# Patient Record
Sex: FEMALE | Race: WHITE | NOT HISPANIC OR LATINO
[De-identification: names, ages, dates, MRNs, and addresses within clinical notes are randomized per-mention and may not be internally consistent; named-entity substitution may affect disease eponyms.]

---

## 2018-04-24 ENCOUNTER — OTHER (OUTPATIENT)
Age: 68
End: 2018-04-24

## 2018-04-24 DIAGNOSIS — M25.562 PAIN IN RIGHT KNEE: ICD-10-CM

## 2018-04-24 DIAGNOSIS — M25.561 PAIN IN RIGHT KNEE: ICD-10-CM

## 2018-05-01 ENCOUNTER — APPOINTMENT (OUTPATIENT)
Dept: ORTHOPEDIC SURGERY | Facility: CLINIC | Age: 68
End: 2018-05-01
Payer: MEDICARE

## 2018-05-01 VITALS
HEIGHT: 67 IN | HEART RATE: 106 BPM | SYSTOLIC BLOOD PRESSURE: 128 MMHG | WEIGHT: 145 LBS | BODY MASS INDEX: 22.76 KG/M2 | DIASTOLIC BLOOD PRESSURE: 86 MMHG

## 2018-05-01 DIAGNOSIS — Z87.09 PERSONAL HISTORY OF OTHER DISEASES OF THE RESPIRATORY SYSTEM: ICD-10-CM

## 2018-05-01 DIAGNOSIS — Z82.61 FAMILY HISTORY OF ARTHRITIS: ICD-10-CM

## 2018-05-01 DIAGNOSIS — Z82.62 FAMILY HISTORY OF OSTEOPOROSIS: ICD-10-CM

## 2018-05-01 DIAGNOSIS — Z86.69 PERSONAL HISTORY OF OTHER DISEASES OF THE NERVOUS SYSTEM AND SENSE ORGANS: ICD-10-CM

## 2018-05-01 DIAGNOSIS — Z80.9 FAMILY HISTORY OF MALIGNANT NEOPLASM, UNSPECIFIED: ICD-10-CM

## 2018-05-01 DIAGNOSIS — Z87.891 PERSONAL HISTORY OF NICOTINE DEPENDENCE: ICD-10-CM

## 2018-05-01 DIAGNOSIS — Z86.79 PERSONAL HISTORY OF OTHER DISEASES OF THE CIRCULATORY SYSTEM: ICD-10-CM

## 2018-05-01 DIAGNOSIS — Z87.39 PERSONAL HISTORY OF OTHER DISEASES OF THE MUSCULOSKELETAL SYSTEM AND CONNECTIVE TISSUE: ICD-10-CM

## 2018-05-01 PROCEDURE — 99205 OFFICE O/P NEW HI 60 MIN: CPT

## 2018-05-01 PROCEDURE — 73564 X-RAY EXAM KNEE 4 OR MORE: CPT | Mod: 50

## 2018-05-01 RX ORDER — ALPRAZOLAM 0.5 MG/1
0.5 TABLET ORAL
Qty: 120 | Refills: 0 | Status: ACTIVE | COMMUNITY
Start: 2018-02-16

## 2018-05-01 RX ORDER — CHLORHEXIDINE GLUCONATE 4 %
LIQUID (ML) TOPICAL
Refills: 0 | Status: ACTIVE | COMMUNITY

## 2018-05-01 RX ORDER — ALENDRONATE SODIUM 70 MG/1
70 TABLET ORAL
Qty: 4 | Refills: 0 | Status: ACTIVE | COMMUNITY
Start: 2018-03-12

## 2018-05-01 RX ORDER — MULTIVIT-MIN/IRON/FOLIC ACID/K 18-600-40
CAPSULE ORAL
Refills: 0 | Status: ACTIVE | COMMUNITY

## 2018-05-01 RX ORDER — PROPRANOLOL HYDROCHLORIDE 10 MG/1
10 TABLET ORAL
Qty: 30 | Refills: 0 | Status: ACTIVE | COMMUNITY
Start: 2018-02-19

## 2018-05-01 RX ORDER — CYCLOSPORINE 0.5 MG/ML
0.05 EMULSION OPHTHALMIC
Refills: 0 | Status: ACTIVE | COMMUNITY

## 2018-05-01 RX ORDER — BACILLUS COAGULANS/INULIN 1B-250 MG
CAPSULE ORAL
Refills: 0 | Status: ACTIVE | COMMUNITY

## 2018-05-01 RX ORDER — UBIDECARENONE/VIT E ACET 100MG-5
CAPSULE ORAL
Refills: 0 | Status: ACTIVE | COMMUNITY

## 2018-05-01 RX ORDER — MAGNESIUM OXIDE/MAG AA CHELATE 300 MG
CAPSULE ORAL
Refills: 0 | Status: ACTIVE | COMMUNITY

## 2018-05-01 RX ORDER — ZINC SULFATE 50(220)MG
CAPSULE ORAL
Refills: 0 | Status: ACTIVE | COMMUNITY

## 2018-05-01 RX ORDER — ALBUTEROL 90 MCG
AEROSOL (GRAM) INHALATION
Refills: 0 | Status: ACTIVE | COMMUNITY

## 2018-05-01 RX ORDER — FEXOFENADINE HYDROCHLORIDE AND PSEUDOEPHEDRINE HYDROCHLORIDE 180; 240 MG/1; MG/1
TABLET, FILM COATED, EXTENDED RELEASE ORAL
Refills: 0 | Status: ACTIVE | COMMUNITY

## 2018-05-01 RX ORDER — ECONAZOLE NITRATE 10 MG/G
1 CREAM TOPICAL
Qty: 85 | Refills: 0 | Status: ACTIVE | COMMUNITY
Start: 2017-11-13

## 2018-05-01 RX ORDER — FLUTICASONE PROPIONATE AND SALMETEROL 50; 500 UG/1; UG/1
POWDER RESPIRATORY (INHALATION)
Refills: 0 | Status: ACTIVE | COMMUNITY

## 2018-06-11 ENCOUNTER — OTHER (OUTPATIENT)
Age: 68
End: 2018-06-11

## 2018-06-12 ENCOUNTER — OTHER (OUTPATIENT)
Age: 68
End: 2018-06-12

## 2018-07-17 ENCOUNTER — OTHER (OUTPATIENT)
Age: 68
End: 2018-07-17

## 2018-07-23 ENCOUNTER — APPOINTMENT (OUTPATIENT)
Dept: ORTHOPEDIC SURGERY | Facility: CLINIC | Age: 68
End: 2018-07-23
Payer: MEDICARE

## 2018-07-23 VITALS
HEART RATE: 116 BPM | WEIGHT: 145 LBS | HEIGHT: 67 IN | SYSTOLIC BLOOD PRESSURE: 131 MMHG | BODY MASS INDEX: 22.76 KG/M2 | DIASTOLIC BLOOD PRESSURE: 86 MMHG

## 2018-07-23 DIAGNOSIS — M17.12 UNILATERAL PRIMARY OSTEOARTHRITIS, LEFT KNEE: ICD-10-CM

## 2018-07-23 DIAGNOSIS — M17.11 UNILATERAL PRIMARY OSTEOARTHRITIS, RIGHT KNEE: ICD-10-CM

## 2018-07-23 PROCEDURE — 99213 OFFICE O/P EST LOW 20 MIN: CPT

## 2018-09-05 ENCOUNTER — OUTPATIENT (OUTPATIENT)
Dept: OUTPATIENT SERVICES | Facility: HOSPITAL | Age: 68
LOS: 1 days | End: 2018-09-05
Payer: MEDICARE

## 2018-09-05 VITALS
DIASTOLIC BLOOD PRESSURE: 82 MMHG | TEMPERATURE: 97 F | RESPIRATION RATE: 16 BRPM | WEIGHT: 149.91 LBS | SYSTOLIC BLOOD PRESSURE: 115 MMHG | HEIGHT: 67 IN | HEART RATE: 81 BPM

## 2018-09-05 DIAGNOSIS — Z98.51 TUBAL LIGATION STATUS: Chronic | ICD-10-CM

## 2018-09-05 DIAGNOSIS — Z98.49 CATARACT EXTRACTION STATUS, UNSPECIFIED EYE: Chronic | ICD-10-CM

## 2018-09-05 DIAGNOSIS — J45.909 UNSPECIFIED ASTHMA, UNCOMPLICATED: ICD-10-CM

## 2018-09-05 DIAGNOSIS — Z01.818 ENCOUNTER FOR OTHER PREPROCEDURAL EXAMINATION: ICD-10-CM

## 2018-09-05 DIAGNOSIS — Z29.9 ENCOUNTER FOR PROPHYLACTIC MEASURES, UNSPECIFIED: ICD-10-CM

## 2018-09-05 DIAGNOSIS — R00.2 PALPITATIONS: ICD-10-CM

## 2018-09-05 DIAGNOSIS — M17.11 UNILATERAL PRIMARY OSTEOARTHRITIS, RIGHT KNEE: ICD-10-CM

## 2018-09-05 DIAGNOSIS — Z98.890 OTHER SPECIFIED POSTPROCEDURAL STATES: Chronic | ICD-10-CM

## 2018-09-05 LAB
ANION GAP SERPL CALC-SCNC: 14 MMOL/L — SIGNIFICANT CHANGE UP (ref 5–17)
APTT BLD: 28.1 SEC — SIGNIFICANT CHANGE UP (ref 27.5–37.4)
BASOPHILS # BLD AUTO: 0 K/UL — SIGNIFICANT CHANGE UP (ref 0–0.2)
BASOPHILS NFR BLD AUTO: 0.5 % — SIGNIFICANT CHANGE UP (ref 0–2)
BLD GP AB SCN SERPL QL: SIGNIFICANT CHANGE UP
BUN SERPL-MCNC: 18 MG/DL — SIGNIFICANT CHANGE UP (ref 8–20)
CALCIUM SERPL-MCNC: 9.7 MG/DL — SIGNIFICANT CHANGE UP (ref 8.6–10.2)
CHLORIDE SERPL-SCNC: 98 MMOL/L — SIGNIFICANT CHANGE UP (ref 98–107)
CO2 SERPL-SCNC: 25 MMOL/L — SIGNIFICANT CHANGE UP (ref 22–29)
CREAT SERPL-MCNC: 0.58 MG/DL — SIGNIFICANT CHANGE UP (ref 0.5–1.3)
DIR ANTIGLOB POLYSPECIFIC INTERPRETATION: SIGNIFICANT CHANGE UP
EOSINOPHIL # BLD AUTO: 0.5 K/UL — SIGNIFICANT CHANGE UP (ref 0–0.5)
EOSINOPHIL NFR BLD AUTO: 6.7 % — HIGH (ref 0–6)
GLUCOSE SERPL-MCNC: 102 MG/DL — SIGNIFICANT CHANGE UP (ref 70–115)
HCT VFR BLD CALC: 50.7 % — HIGH (ref 37–47)
HGB BLD-MCNC: 16.5 G/DL — HIGH (ref 12–16)
INR BLD: 1.08 RATIO — SIGNIFICANT CHANGE UP (ref 0.88–1.16)
LYMPHOCYTES # BLD AUTO: 1.6 K/UL — SIGNIFICANT CHANGE UP (ref 1–4.8)
LYMPHOCYTES # BLD AUTO: 21.5 % — SIGNIFICANT CHANGE UP (ref 20–55)
MCHC RBC-ENTMCNC: 31.3 PG — HIGH (ref 27–31)
MCHC RBC-ENTMCNC: 32.5 G/DL — SIGNIFICANT CHANGE UP (ref 32–36)
MCV RBC AUTO: 96.2 FL — SIGNIFICANT CHANGE UP (ref 81–99)
MONOCYTES # BLD AUTO: 0.6 K/UL — SIGNIFICANT CHANGE UP (ref 0–0.8)
MONOCYTES NFR BLD AUTO: 8.6 % — SIGNIFICANT CHANGE UP (ref 3–10)
MRSA PCR RESULT.: SIGNIFICANT CHANGE UP
NEUTROPHILS # BLD AUTO: 4.6 K/UL — SIGNIFICANT CHANGE UP (ref 1.8–8)
NEUTROPHILS NFR BLD AUTO: 62.6 % — SIGNIFICANT CHANGE UP (ref 37–73)
PLATELET # BLD AUTO: 284 K/UL — SIGNIFICANT CHANGE UP (ref 150–400)
POTASSIUM SERPL-MCNC: 4.7 MMOL/L — SIGNIFICANT CHANGE UP (ref 3.5–5.3)
POTASSIUM SERPL-SCNC: 4.7 MMOL/L — SIGNIFICANT CHANGE UP (ref 3.5–5.3)
PROTHROM AB SERPL-ACNC: 11.9 SEC — SIGNIFICANT CHANGE UP (ref 9.8–12.7)
RBC # BLD: 5.27 M/UL — HIGH (ref 4.4–5.2)
RBC # FLD: 13.7 % — SIGNIFICANT CHANGE UP (ref 11–15.6)
S AUREUS DNA NOSE QL NAA+PROBE: SIGNIFICANT CHANGE UP
SODIUM SERPL-SCNC: 137 MMOL/L — SIGNIFICANT CHANGE UP (ref 135–145)
TYPE + AB SCN PNL BLD: SIGNIFICANT CHANGE UP
WBC # BLD: 7.4 K/UL — SIGNIFICANT CHANGE UP (ref 4.8–10.8)
WBC # FLD AUTO: 7.4 K/UL — SIGNIFICANT CHANGE UP (ref 4.8–10.8)

## 2018-09-05 PROCEDURE — 86901 BLOOD TYPING SEROLOGIC RH(D): CPT

## 2018-09-05 PROCEDURE — 86900 BLOOD TYPING SEROLOGIC ABO: CPT

## 2018-09-05 PROCEDURE — 85730 THROMBOPLASTIN TIME PARTIAL: CPT

## 2018-09-05 PROCEDURE — 85610 PROTHROMBIN TIME: CPT

## 2018-09-05 PROCEDURE — 86850 RBC ANTIBODY SCREEN: CPT

## 2018-09-05 PROCEDURE — 87641 MR-STAPH DNA AMP PROBE: CPT

## 2018-09-05 PROCEDURE — 80048 BASIC METABOLIC PNL TOTAL CA: CPT

## 2018-09-05 PROCEDURE — G0463: CPT

## 2018-09-05 PROCEDURE — 85027 COMPLETE CBC AUTOMATED: CPT

## 2018-09-05 PROCEDURE — 86880 COOMBS TEST DIRECT: CPT

## 2018-09-05 PROCEDURE — 36415 COLL VENOUS BLD VENIPUNCTURE: CPT

## 2018-09-05 PROCEDURE — 87640 STAPH A DNA AMP PROBE: CPT

## 2018-09-05 RX ORDER — SODIUM CHLORIDE 9 MG/ML
3 INJECTION INTRAMUSCULAR; INTRAVENOUS; SUBCUTANEOUS EVERY 8 HOURS
Qty: 0 | Refills: 0 | Status: DISCONTINUED | OUTPATIENT
Start: 2018-11-07 | End: 2018-11-09

## 2018-09-05 NOTE — H&P PST ADULT - FAMILY HISTORY
Child  Still living? Yes, Estimated age: 31-40  Family history of malignant hyperthermia, Age at diagnosis: Age Unknown     Father  Still living? No  Family history of coronary artery disease, Age at diagnosis: 51-60

## 2018-09-05 NOTE — H&P PST ADULT - NSANTHOSAYNRD_GEN_A_CORE
No. MAYCOL screening performed.  STOP BANG Legend: 0-2 = LOW Risk; 3-4 = INTERMEDIATE Risk; 5-8 = HIGH Risk

## 2018-09-05 NOTE — H&P PST ADULT - ASSESSMENT
68 yo female with right knee osteoarthritis.  Family history of malignant hyperthermia, D/W Dr. Ward, pt to be a first case, OR bookers aware.    CAPRINI SCORE [CLOT]    AGE RELATED RISK FACTORS                                                       MOBILITY RELATED FACTORS  [ ] Age 41-60 years                                            (1 Point)                  [ ] Bed rest                                                        (1 Point)  [  2] Age: 61-74 years                                           (2 Points)                 [ ] Plaster cast                                                   (2 Points)  [ ] Age= 75 years                                              (3 Points)                 [ ] Bed bound for more than 72 hours                 (2 Points)    DISEASE RELATED RISK FACTORS                                               GENDER SPECIFIC FACTORS  [ ] Edema in the lower extremities                       (1 Point)                  [ ] Pregnancy                                                     (1 Point)  [ ] Varicose veins                                               (1 Point)                  [ ] Post-partum < 6 weeks                                   (1 Point)             [ x ] BMI > 25 Kg/m2                                            (1 Point)                  [ ] Hormonal therapy  or oral contraception          (1 Point)                 [ ] Sepsis (in the previous month)                        (1 Point)                  [ ] History of pregnancy complications                 (1 point)  [ ] Pneumonia or serious lung disease                                               [ ] Unexplained or recurrent                     (1 Point)           (in the previous month)                               (1 Point)  [ ] Abnormal pulmonary function test                     (1 Point)                 SURGERY RELATED RISK FACTORS  [ ] Acute myocardial infarction                              (1 Point)                 [ ]  Section                                             (1 Point)  [ ] Congestive heart failure (in the previous month)  (1 Point)               [ ] Minor surgery                                                  (1 Point)   [ ] Inflammatory bowel disease                             (1 Point)                 [ ] Arthroscopic surgery                                        (2 Points)  [ ] Central venous access                                      (2 Points)                [ ] General surgery lasting more than 45 minutes   (2 Points)       [ ] Stroke (in the previous month)                          (5 Points)               [ 8 ] Elective arthroplasty                                         (5 Points)                                                                                                                                               HEMATOLOGY RELATED FACTORS                                                 TRAUMA RELATED RISK FACTORS  [ ] Prior episodes of VTE                                     (3 Points)                 [ ] Fracture of the hip, pelvis, or leg                       (5 Points)  [ ] Positive family history for VTE                         (3 Points)                 [ ] Acute spinal cord injury (in the previous month)  (5 Points)  [ ] Prothrombin 98139 A                                     (3 Points)                 [ ] Paralysis  (less than 1 month)                             (5 Points)  [ ] Factor V Leiden                                             (3 Points)                  [ ] Multiple Trauma within 1 month                        (5 Points)  [ ] Lupus anticoagulants                                     (3 Points)                                                           [ ] Anticardiolipin antibodies                               (3 Points)                                                       [ ] High homocysteine in the blood                      (3 Points)                                             [ ] Other congenital or acquired thrombophilia      (3 Points)                                                [ ] Heparin induced thrombocytopenia                  (3 Points)                                          Total Score [     8     ]

## 2018-09-05 NOTE — H&P PST ADULT - PSH
S/P cataract extraction and insertion of intraocular lens    S/P knee surgery  R  S/P tubal ligation

## 2018-09-12 ENCOUNTER — OTHER (OUTPATIENT)
Age: 68
End: 2018-09-12

## 2018-09-13 RX ORDER — SCOPALAMINE 1 MG/3D
1 PATCH, EXTENDED RELEASE TRANSDERMAL ONCE
Qty: 0 | Refills: 0 | Status: DISCONTINUED | OUTPATIENT
Start: 2018-11-07 | End: 2018-11-09

## 2018-09-13 RX ORDER — TRANEXAMIC ACID 100 MG/ML
700 INJECTION, SOLUTION INTRAVENOUS ONCE
Qty: 0 | Refills: 0 | Status: DISCONTINUED | OUTPATIENT
Start: 2018-11-07 | End: 2018-11-09

## 2018-09-13 RX ORDER — OXYCODONE HYDROCHLORIDE 5 MG/1
10 TABLET ORAL ONCE
Qty: 0 | Refills: 0 | Status: DISCONTINUED | OUTPATIENT
Start: 2018-11-07 | End: 2018-11-09

## 2018-09-19 ENCOUNTER — APPOINTMENT (OUTPATIENT)
Dept: ORTHOPEDIC SURGERY | Facility: HOSPITAL | Age: 68
End: 2018-09-19

## 2018-10-04 ENCOUNTER — APPOINTMENT (OUTPATIENT)
Dept: ORTHOPEDIC SURGERY | Facility: CLINIC | Age: 68
End: 2018-10-04

## 2018-10-10 PROBLEM — B19.10 UNSPECIFIED VIRAL HEPATITIS B WITHOUT HEPATIC COMA: Chronic | Status: ACTIVE | Noted: 2018-09-05

## 2018-10-10 PROBLEM — J45.909 UNSPECIFIED ASTHMA, UNCOMPLICATED: Chronic | Status: ACTIVE | Noted: 2018-09-05

## 2018-10-10 PROBLEM — R00.2 PALPITATIONS: Chronic | Status: ACTIVE | Noted: 2018-09-05

## 2018-10-24 ENCOUNTER — OUTPATIENT (OUTPATIENT)
Dept: OUTPATIENT SERVICES | Facility: HOSPITAL | Age: 68
LOS: 1 days | End: 2018-10-24
Payer: MEDICARE

## 2018-10-24 VITALS
SYSTOLIC BLOOD PRESSURE: 108 MMHG | DIASTOLIC BLOOD PRESSURE: 70 MMHG | TEMPERATURE: 98 F | RESPIRATION RATE: 20 BRPM | WEIGHT: 152.56 LBS | HEIGHT: 66 IN | HEART RATE: 80 BPM

## 2018-10-24 DIAGNOSIS — Z98.51 TUBAL LIGATION STATUS: Chronic | ICD-10-CM

## 2018-10-24 DIAGNOSIS — M17.12 UNILATERAL PRIMARY OSTEOARTHRITIS, LEFT KNEE: ICD-10-CM

## 2018-10-24 DIAGNOSIS — Z98.49 CATARACT EXTRACTION STATUS, UNSPECIFIED EYE: Chronic | ICD-10-CM

## 2018-10-24 DIAGNOSIS — Z98.890 OTHER SPECIFIED POSTPROCEDURAL STATES: Chronic | ICD-10-CM

## 2018-10-24 DIAGNOSIS — Z01.818 ENCOUNTER FOR OTHER PREPROCEDURAL EXAMINATION: ICD-10-CM

## 2018-10-24 DIAGNOSIS — I47.2 VENTRICULAR TACHYCARDIA: ICD-10-CM

## 2018-10-24 DIAGNOSIS — Z29.9 ENCOUNTER FOR PROPHYLACTIC MEASURES, UNSPECIFIED: ICD-10-CM

## 2018-10-24 LAB
ANION GAP SERPL CALC-SCNC: 12 MMOL/L — SIGNIFICANT CHANGE UP (ref 5–17)
APTT BLD: 29.2 SEC — SIGNIFICANT CHANGE UP (ref 27.5–37.4)
BLD GP AB SCN SERPL QL: SIGNIFICANT CHANGE UP
BUN SERPL-MCNC: 18 MG/DL — SIGNIFICANT CHANGE UP (ref 8–20)
CALCIUM SERPL-MCNC: 9.6 MG/DL — SIGNIFICANT CHANGE UP (ref 8.6–10.2)
CHLORIDE SERPL-SCNC: 98 MMOL/L — SIGNIFICANT CHANGE UP (ref 98–107)
CO2 SERPL-SCNC: 26 MMOL/L — SIGNIFICANT CHANGE UP (ref 22–29)
CREAT SERPL-MCNC: 0.58 MG/DL — SIGNIFICANT CHANGE UP (ref 0.5–1.3)
GLUCOSE SERPL-MCNC: 98 MG/DL — SIGNIFICANT CHANGE UP (ref 70–115)
HCT VFR BLD CALC: 48.2 % — HIGH (ref 37–47)
HGB BLD-MCNC: 16.3 G/DL — HIGH (ref 12–16)
INR BLD: 1.1 RATIO — SIGNIFICANT CHANGE UP (ref 0.88–1.16)
MCHC RBC-ENTMCNC: 31.5 PG — HIGH (ref 27–31)
MCHC RBC-ENTMCNC: 33.8 G/DL — SIGNIFICANT CHANGE UP (ref 32–36)
MCV RBC AUTO: 93.1 FL — SIGNIFICANT CHANGE UP (ref 81–99)
MRSA PCR RESULT.: SIGNIFICANT CHANGE UP
PLATELET # BLD AUTO: 300 K/UL — SIGNIFICANT CHANGE UP (ref 150–400)
POTASSIUM SERPL-MCNC: 4.3 MMOL/L — SIGNIFICANT CHANGE UP (ref 3.5–5.3)
POTASSIUM SERPL-SCNC: 4.3 MMOL/L — SIGNIFICANT CHANGE UP (ref 3.5–5.3)
PROTHROM AB SERPL-ACNC: 12.1 SEC — SIGNIFICANT CHANGE UP (ref 9.8–12.7)
RBC # BLD: 5.18 M/UL — SIGNIFICANT CHANGE UP (ref 4.4–5.2)
RBC # FLD: 14.2 % — SIGNIFICANT CHANGE UP (ref 11–15.6)
S AUREUS DNA NOSE QL NAA+PROBE: SIGNIFICANT CHANGE UP
SODIUM SERPL-SCNC: 136 MMOL/L — SIGNIFICANT CHANGE UP (ref 135–145)
TYPE + AB SCN PNL BLD: SIGNIFICANT CHANGE UP
WBC # BLD: 7.2 K/UL — SIGNIFICANT CHANGE UP (ref 4.8–10.8)
WBC # FLD AUTO: 7.2 K/UL — SIGNIFICANT CHANGE UP (ref 4.8–10.8)

## 2018-10-24 PROCEDURE — 80048 BASIC METABOLIC PNL TOTAL CA: CPT

## 2018-10-24 PROCEDURE — 36415 COLL VENOUS BLD VENIPUNCTURE: CPT

## 2018-10-24 PROCEDURE — 85730 THROMBOPLASTIN TIME PARTIAL: CPT

## 2018-10-24 PROCEDURE — 86901 BLOOD TYPING SEROLOGIC RH(D): CPT

## 2018-10-24 PROCEDURE — 87640 STAPH A DNA AMP PROBE: CPT

## 2018-10-24 PROCEDURE — 86803 HEPATITIS C AB TEST: CPT

## 2018-10-24 PROCEDURE — 86900 BLOOD TYPING SEROLOGIC ABO: CPT

## 2018-10-24 PROCEDURE — 85610 PROTHROMBIN TIME: CPT

## 2018-10-24 PROCEDURE — G0463: CPT

## 2018-10-24 PROCEDURE — 85027 COMPLETE CBC AUTOMATED: CPT

## 2018-10-24 PROCEDURE — 86850 RBC ANTIBODY SCREEN: CPT

## 2018-10-24 RX ORDER — METOPROLOL TARTRATE 50 MG
0.5 TABLET ORAL
Qty: 0 | Refills: 0 | COMMUNITY

## 2018-10-24 RX ORDER — UBIDECARENONE 100 MG
1 CAPSULE ORAL
Qty: 0 | Refills: 0 | COMMUNITY

## 2018-10-24 RX ORDER — SODIUM CHLORIDE 9 MG/ML
3 INJECTION INTRAMUSCULAR; INTRAVENOUS; SUBCUTANEOUS EVERY 8 HOURS
Qty: 0 | Refills: 0 | Status: DISCONTINUED | OUTPATIENT
Start: 2018-11-07 | End: 2018-11-09

## 2018-10-24 NOTE — H&P PST ADULT - ASSESSMENT
CAPRINI SCORE [CLOT]    AGE RELATED RISK FACTORS                                                       MOBILITY RELATED FACTORS  [ ] Age 41-60 years                                            (1 Point)                  [ ] Bed rest                                                        (1 Point)  [x] Age: 61-74 years                                           (2 Points)                 [ ] Plaster cast                                                   (2 Points)  [ ] Age= 75 years                                              (3 Points)                 [ ] Bed bound for more than 72 hours                 (2 Points)    DISEASE RELATED RISK FACTORS                                               GENDER SPECIFIC FACTORS  [ ] Edema in the lower extremities                       (1 Point)                  [ ] Pregnancy                                                     (1 Point)  [ ] Varicose veins                                               (1 Point)                  [ ] Post-partum < 6 weeks                                   (1 Point)             [ ] BMI > 25 Kg/m2                                            (1 Point)                  [ ] Hormonal therapy  or oral contraception          (1 Point)                 [ ] Sepsis (in the previous month)                        (1 Point)                  [ ] History of pregnancy complications                 (1 point)  [ ] Pneumonia or serious lung disease                                               [ ] Unexplained or recurrent                     (1 Point)           (in the previous month)                               (1 Point)  [ ] Abnormal pulmonary function test                     (1 Point)                 SURGERY RELATED RISK FACTORS  [ ] Acute myocardial infarction                              (1 Point)                 [ ]  Section                                             (1 Point)  [ ] Congestive heart failure (in the previous month)  (1 Point)               [ ] Minor surgery                                                  (1 Point)   [ ] Inflammatory bowel disease                             (1 Point)                 [ ] Arthroscopic surgery                                        (2 Points)  [ ] Central venous access                                      (2 Points)                [ ] General surgery lasting more than 45 minutes   (2 Points)       [ ] Stroke (in the previous month)                          (5 Points)               [x ] Elective arthroplasty                                         (5 Points)                                                                                                                                               HEMATOLOGY RELATED FACTORS                                                 TRAUMA RELATED RISK FACTORS  [ ] Prior episodes of VTE                                     (3 Points)                 [ ] Fracture of the hip, pelvis, or leg                       (5 Points)  [ ] Positive family history for VTE                         (3 Points)                 [ ] Acute spinal cord injury (in the previous month)  (5 Points)  [ ] Prothrombin 60419 A                                     (3 Points)                 [ ] Paralysis  (less than 1 month)                             (5 Points)  [ ] Factor V Leiden                                             (3 Points)                  [ ] Multiple Trauma within 1 month                        (5 Points)  [ ] Lupus anticoagulants                                     (3 Points)                                                           [ ] Anticardiolipin antibodies                               (3 Points)                                                       [ ] High homocysteine in the blood                      (3 Points)                                             [ ] Other congenital or acquired thrombophilia      (3 Points)                                                [ ] Heparin induced thrombocytopenia                  (3 Points)                                          Total Score [   7       ]

## 2018-10-24 NOTE — H&P PST ADULT - HISTORY OF PRESENT ILLNESS
This is a 68 y.o female who presents to PST today.  The pt reports a chronic history of left knee pain which has become worse more recently.  She is scheduled for a left knee replacement in the near future.

## 2018-10-24 NOTE — H&P PST ADULT - RS GEN PE MLT RESP DETAILS PC
respirations non-labored/normal/diminished breath sounds, L/airway patent/diminished breath sounds, R

## 2018-10-24 NOTE — PATIENT PROFILE ADULT - NSPROEDALEARNPREF_GEN_A_NUR
Patient verbalized understanding of all instructions including surgical wash and pain scale/individual instruction

## 2018-10-25 LAB
HCV AB S/CO SERPL IA: 0.09 S/CO — SIGNIFICANT CHANGE UP
HCV AB SERPL-IMP: SIGNIFICANT CHANGE UP

## 2018-10-26 RX ORDER — ACETAMINOPHEN 500 MG
1000 TABLET ORAL ONCE
Qty: 0 | Refills: 0 | Status: DISCONTINUED | OUTPATIENT
Start: 2018-11-07 | End: 2018-11-07

## 2018-10-26 RX ORDER — TRANEXAMIC ACID 100 MG/ML
700 INJECTION, SOLUTION INTRAVENOUS ONCE
Qty: 0 | Refills: 0 | Status: DISCONTINUED | OUTPATIENT
Start: 2018-11-07 | End: 2018-11-07

## 2018-10-26 RX ORDER — CEFAZOLIN SODIUM 1 G
2000 VIAL (EA) INJECTION ONCE
Qty: 0 | Refills: 0 | Status: DISCONTINUED | OUTPATIENT
Start: 2018-11-07 | End: 2018-11-07

## 2018-11-01 ENCOUNTER — APPOINTMENT (OUTPATIENT)
Dept: ORTHOPEDIC SURGERY | Facility: CLINIC | Age: 68
End: 2018-11-01

## 2018-11-06 ENCOUNTER — TRANSCRIPTION ENCOUNTER (OUTPATIENT)
Age: 68
End: 2018-11-06

## 2018-11-07 ENCOUNTER — TRANSCRIPTION ENCOUNTER (OUTPATIENT)
Age: 68
End: 2018-11-07

## 2018-11-07 ENCOUNTER — INPATIENT (INPATIENT)
Facility: HOSPITAL | Age: 68
LOS: 1 days | Discharge: HOME | DRG: 470 | End: 2018-11-09
Attending: ORTHOPAEDIC SURGERY | Admitting: ORTHOPAEDIC SURGERY
Payer: MEDICARE

## 2018-11-07 ENCOUNTER — APPOINTMENT (OUTPATIENT)
Dept: ORTHOPEDIC SURGERY | Facility: HOSPITAL | Age: 68
End: 2018-11-07

## 2018-11-07 VITALS
SYSTOLIC BLOOD PRESSURE: 116 MMHG | HEART RATE: 88 BPM | RESPIRATION RATE: 16 BRPM | TEMPERATURE: 97 F | DIASTOLIC BLOOD PRESSURE: 70 MMHG | WEIGHT: 147.93 LBS | OXYGEN SATURATION: 100 % | HEIGHT: 67 IN

## 2018-11-07 DIAGNOSIS — Z98.49 CATARACT EXTRACTION STATUS, UNSPECIFIED EYE: Chronic | ICD-10-CM

## 2018-11-07 DIAGNOSIS — Z86.79 PERSONAL HISTORY OF OTHER DISEASES OF THE CIRCULATORY SYSTEM: ICD-10-CM

## 2018-11-07 DIAGNOSIS — M17.0 BILATERAL PRIMARY OSTEOARTHRITIS OF KNEE: ICD-10-CM

## 2018-11-07 DIAGNOSIS — M17.11 UNILATERAL PRIMARY OSTEOARTHRITIS, RIGHT KNEE: ICD-10-CM

## 2018-11-07 DIAGNOSIS — Z29.9 ENCOUNTER FOR PROPHYLACTIC MEASURES, UNSPECIFIED: ICD-10-CM

## 2018-11-07 DIAGNOSIS — Z86.19 PERSONAL HISTORY OF OTHER INFECTIOUS AND PARASITIC DISEASES: ICD-10-CM

## 2018-11-07 DIAGNOSIS — Z00.00 ENCOUNTER FOR GENERAL ADULT MEDICAL EXAMINATION WITHOUT ABNORMAL FINDINGS: ICD-10-CM

## 2018-11-07 DIAGNOSIS — Z98.890 OTHER SPECIFIED POSTPROCEDURAL STATES: Chronic | ICD-10-CM

## 2018-11-07 DIAGNOSIS — Z98.51 TUBAL LIGATION STATUS: Chronic | ICD-10-CM

## 2018-11-07 DIAGNOSIS — Z87.09 PERSONAL HISTORY OF OTHER DISEASES OF THE RESPIRATORY SYSTEM: ICD-10-CM

## 2018-11-07 DIAGNOSIS — Z96.651 PRESENCE OF RIGHT ARTIFICIAL KNEE JOINT: ICD-10-CM

## 2018-11-07 DIAGNOSIS — M17.12 UNILATERAL PRIMARY OSTEOARTHRITIS, LEFT KNEE: ICD-10-CM

## 2018-11-07 LAB
BLD GP AB SCN SERPL QL: SIGNIFICANT CHANGE UP
GLUCOSE BLDC GLUCOMTR-MCNC: 86 MG/DL — SIGNIFICANT CHANGE UP (ref 70–99)
GLUCOSE BLDC GLUCOMTR-MCNC: 96 MG/DL — SIGNIFICANT CHANGE UP (ref 70–99)
GLUCOSE BLDC GLUCOMTR-MCNC: 98 MG/DL — SIGNIFICANT CHANGE UP (ref 70–99)
TYPE + AB SCN PNL BLD: SIGNIFICANT CHANGE UP

## 2018-11-07 PROCEDURE — 27447 TOTAL KNEE ARTHROPLASTY: CPT | Mod: AS,RT

## 2018-11-07 PROCEDURE — 73560 X-RAY EXAM OF KNEE 1 OR 2: CPT | Mod: 26,RT

## 2018-11-07 PROCEDURE — 99223 1ST HOSP IP/OBS HIGH 75: CPT

## 2018-11-07 PROCEDURE — 20985 CPTR-ASST DIR MS PX: CPT | Mod: AS

## 2018-11-07 PROCEDURE — 11422 EXC H-F-NK-SP B9+MARG 1.1-2: CPT | Mod: RT

## 2018-11-07 PROCEDURE — 27447 TOTAL KNEE ARTHROPLASTY: CPT | Mod: RT

## 2018-11-07 PROCEDURE — 20985 CPTR-ASST DIR MS PX: CPT

## 2018-11-07 RX ORDER — ASPIRIN/CALCIUM CARB/MAGNESIUM 324 MG
325 TABLET ORAL
Qty: 0 | Refills: 0 | Status: DISCONTINUED | OUTPATIENT
Start: 2018-11-08 | End: 2018-11-09

## 2018-11-07 RX ORDER — ZOLPIDEM TARTRATE 10 MG/1
5 TABLET ORAL AT BEDTIME
Qty: 0 | Refills: 0 | Status: DISCONTINUED | OUTPATIENT
Start: 2018-11-07 | End: 2018-11-09

## 2018-11-07 RX ORDER — SODIUM CHLORIDE 9 MG/ML
1000 INJECTION, SOLUTION INTRAVENOUS
Qty: 0 | Refills: 0 | Status: DISCONTINUED | OUTPATIENT
Start: 2018-11-07 | End: 2018-11-07

## 2018-11-07 RX ORDER — LORATADINE 10 MG/1
10 TABLET ORAL DAILY
Qty: 0 | Refills: 0 | Status: DISCONTINUED | OUTPATIENT
Start: 2018-11-07 | End: 2018-11-09

## 2018-11-07 RX ORDER — ALPRAZOLAM 0.25 MG
0.5 TABLET ORAL AT BEDTIME
Qty: 0 | Refills: 0 | Status: DISCONTINUED | OUTPATIENT
Start: 2018-11-07 | End: 2018-11-09

## 2018-11-07 RX ORDER — CELECOXIB 200 MG/1
200 CAPSULE ORAL
Qty: 0 | Refills: 0 | Status: DISCONTINUED | OUTPATIENT
Start: 2018-11-09 | End: 2018-11-09

## 2018-11-07 RX ORDER — CEPHALEXIN 500 MG
500 CAPSULE ORAL
Qty: 0 | Refills: 0 | Status: DISCONTINUED | OUTPATIENT
Start: 2018-11-08 | End: 2018-11-09

## 2018-11-07 RX ORDER — HYDROMORPHONE HYDROCHLORIDE 2 MG/ML
0.5 INJECTION INTRAMUSCULAR; INTRAVENOUS; SUBCUTANEOUS
Qty: 0 | Refills: 0 | Status: DISCONTINUED | OUTPATIENT
Start: 2018-11-07 | End: 2018-11-07

## 2018-11-07 RX ORDER — POLYETHYLENE GLYCOL 3350 17 G/17G
17 POWDER, FOR SOLUTION ORAL DAILY
Qty: 0 | Refills: 0 | Status: DISCONTINUED | OUTPATIENT
Start: 2018-11-07 | End: 2018-11-09

## 2018-11-07 RX ORDER — OXYCODONE HYDROCHLORIDE 5 MG/1
10 TABLET ORAL ONCE
Qty: 0 | Refills: 0 | Status: DISCONTINUED | OUTPATIENT
Start: 2018-11-07 | End: 2018-11-07

## 2018-11-07 RX ORDER — CEFAZOLIN SODIUM 1 G
2000 VIAL (EA) INJECTION ONCE
Qty: 0 | Refills: 0 | Status: DISCONTINUED | OUTPATIENT
Start: 2018-11-07 | End: 2018-11-07

## 2018-11-07 RX ORDER — ACETAMINOPHEN 500 MG
975 TABLET ORAL EVERY 8 HOURS
Qty: 0 | Refills: 0 | Status: DISCONTINUED | OUTPATIENT
Start: 2018-11-07 | End: 2018-11-09

## 2018-11-07 RX ORDER — VANCOMYCIN HCL 1 G
1000 VIAL (EA) INTRAVENOUS ONCE
Qty: 0 | Refills: 0 | Status: COMPLETED | OUTPATIENT
Start: 2018-11-07 | End: 2018-11-07

## 2018-11-07 RX ORDER — SODIUM CHLORIDE 9 MG/ML
1000 INJECTION, SOLUTION INTRAVENOUS
Qty: 0 | Refills: 0 | Status: DISCONTINUED | OUTPATIENT
Start: 2018-11-07 | End: 2018-11-09

## 2018-11-07 RX ORDER — OXYCODONE HYDROCHLORIDE 5 MG/1
10 TABLET ORAL EVERY 4 HOURS
Qty: 0 | Refills: 0 | Status: DISCONTINUED | OUTPATIENT
Start: 2018-11-07 | End: 2018-11-09

## 2018-11-07 RX ORDER — ACETAMINOPHEN 500 MG
1000 TABLET ORAL ONCE
Qty: 0 | Refills: 0 | Status: DISCONTINUED | OUTPATIENT
Start: 2018-11-07 | End: 2018-11-07

## 2018-11-07 RX ORDER — GABAPENTIN 400 MG/1
300 CAPSULE ORAL ONCE
Qty: 0 | Refills: 0 | Status: DISCONTINUED | OUTPATIENT
Start: 2018-11-07 | End: 2018-11-07

## 2018-11-07 RX ORDER — SENNA PLUS 8.6 MG/1
2 TABLET ORAL AT BEDTIME
Qty: 0 | Refills: 0 | Status: DISCONTINUED | OUTPATIENT
Start: 2018-11-07 | End: 2018-11-09

## 2018-11-07 RX ORDER — ALBUTEROL 90 UG/1
1 AEROSOL, METERED ORAL
Qty: 0 | Refills: 0 | Status: DISCONTINUED | OUTPATIENT
Start: 2018-11-07 | End: 2018-11-09

## 2018-11-07 RX ORDER — KETOROLAC TROMETHAMINE 30 MG/ML
15 SYRINGE (ML) INJECTION EVERY 6 HOURS
Qty: 0 | Refills: 0 | Status: DISCONTINUED | OUTPATIENT
Start: 2018-11-07 | End: 2018-11-09

## 2018-11-07 RX ORDER — VANCOMYCIN HCL 1 G
1000 VIAL (EA) INTRAVENOUS ONCE
Qty: 0 | Refills: 0 | Status: DISCONTINUED | OUTPATIENT
Start: 2018-11-07 | End: 2018-11-07

## 2018-11-07 RX ORDER — SCOPALAMINE 1 MG/3D
1 PATCH, EXTENDED RELEASE TRANSDERMAL ONCE
Qty: 0 | Refills: 0 | Status: COMPLETED | OUTPATIENT
Start: 2018-11-07 | End: 2018-11-07

## 2018-11-07 RX ORDER — MAGNESIUM HYDROXIDE 400 MG/1
30 TABLET, CHEWABLE ORAL DAILY
Qty: 0 | Refills: 0 | Status: DISCONTINUED | OUTPATIENT
Start: 2018-11-07 | End: 2018-11-09

## 2018-11-07 RX ORDER — OXYCODONE HYDROCHLORIDE 5 MG/1
5 TABLET ORAL EVERY 4 HOURS
Qty: 0 | Refills: 0 | Status: DISCONTINUED | OUTPATIENT
Start: 2018-11-07 | End: 2018-11-09

## 2018-11-07 RX ORDER — ACETAMINOPHEN 500 MG
1000 TABLET ORAL ONCE
Qty: 0 | Refills: 0 | Status: DISCONTINUED | OUTPATIENT
Start: 2018-11-07 | End: 2018-11-09

## 2018-11-07 RX ORDER — FENTANYL CITRATE 50 UG/ML
50 INJECTION INTRAVENOUS
Qty: 0 | Refills: 0 | Status: DISCONTINUED | OUTPATIENT
Start: 2018-11-07 | End: 2018-11-07

## 2018-11-07 RX ORDER — OXYCODONE HYDROCHLORIDE 5 MG/1
10 TABLET ORAL EVERY 12 HOURS
Qty: 0 | Refills: 0 | Status: DISCONTINUED | OUTPATIENT
Start: 2018-11-07 | End: 2018-11-09

## 2018-11-07 RX ORDER — GABAPENTIN 400 MG/1
300 CAPSULE ORAL ONCE
Qty: 0 | Refills: 0 | Status: COMPLETED | OUTPATIENT
Start: 2018-11-07 | End: 2018-11-07

## 2018-11-07 RX ORDER — ONDANSETRON 8 MG/1
4 TABLET, FILM COATED ORAL EVERY 6 HOURS
Qty: 0 | Refills: 0 | Status: DISCONTINUED | OUTPATIENT
Start: 2018-11-07 | End: 2018-11-09

## 2018-11-07 RX ORDER — BUDESONIDE AND FORMOTEROL FUMARATE DIHYDRATE 160; 4.5 UG/1; UG/1
2 AEROSOL RESPIRATORY (INHALATION)
Qty: 0 | Refills: 0 | Status: DISCONTINUED | OUTPATIENT
Start: 2018-11-07 | End: 2018-11-09

## 2018-11-07 RX ORDER — CELECOXIB 200 MG/1
400 CAPSULE ORAL ONCE
Qty: 0 | Refills: 0 | Status: DISCONTINUED | OUTPATIENT
Start: 2018-11-07 | End: 2018-11-07

## 2018-11-07 RX ORDER — METOPROLOL TARTRATE 50 MG
12.5 TABLET ORAL
Qty: 0 | Refills: 0 | Status: DISCONTINUED | OUTPATIENT
Start: 2018-11-07 | End: 2018-11-09

## 2018-11-07 RX ORDER — CELECOXIB 200 MG/1
400 CAPSULE ORAL ONCE
Qty: 0 | Refills: 0 | Status: COMPLETED | OUTPATIENT
Start: 2018-11-07 | End: 2018-11-07

## 2018-11-07 RX ORDER — VANCOMYCIN HCL 1 G
1000 VIAL (EA) INTRAVENOUS
Qty: 0 | Refills: 0 | Status: COMPLETED | OUTPATIENT
Start: 2018-11-07 | End: 2018-11-07

## 2018-11-07 RX ORDER — ONDANSETRON 8 MG/1
4 TABLET, FILM COATED ORAL ONCE
Qty: 0 | Refills: 0 | Status: DISCONTINUED | OUTPATIENT
Start: 2018-11-07 | End: 2018-11-07

## 2018-11-07 RX ORDER — CEFAZOLIN SODIUM 1 G
2000 VIAL (EA) INJECTION
Qty: 0 | Refills: 0 | Status: COMPLETED | OUTPATIENT
Start: 2018-11-07 | End: 2018-11-07

## 2018-11-07 RX ORDER — DOCUSATE SODIUM 100 MG
100 CAPSULE ORAL THREE TIMES A DAY
Qty: 0 | Refills: 0 | Status: DISCONTINUED | OUTPATIENT
Start: 2018-11-07 | End: 2018-11-09

## 2018-11-07 RX ORDER — HYDROMORPHONE HYDROCHLORIDE 2 MG/ML
0.5 INJECTION INTRAMUSCULAR; INTRAVENOUS; SUBCUTANEOUS
Qty: 0 | Refills: 0 | Status: DISCONTINUED | OUTPATIENT
Start: 2018-11-07 | End: 2018-11-09

## 2018-11-07 RX ORDER — HYDROMORPHONE HYDROCHLORIDE 2 MG/ML
2 INJECTION INTRAMUSCULAR; INTRAVENOUS; SUBCUTANEOUS
Qty: 0 | Refills: 0 | Status: DISCONTINUED | OUTPATIENT
Start: 2018-11-07 | End: 2018-11-09

## 2018-11-07 RX ADMIN — OXYCODONE HYDROCHLORIDE 10 MILLIGRAM(S): 5 TABLET ORAL at 17:26

## 2018-11-07 RX ADMIN — SODIUM CHLORIDE 3 MILLILITER(S): 9 INJECTION INTRAMUSCULAR; INTRAVENOUS; SUBCUTANEOUS at 21:39

## 2018-11-07 RX ADMIN — Medication 250 MILLIGRAM(S): at 07:28

## 2018-11-07 RX ADMIN — SENNA PLUS 2 TABLET(S): 8.6 TABLET ORAL at 22:19

## 2018-11-07 RX ADMIN — Medication 250 MILLIGRAM(S): at 22:24

## 2018-11-07 RX ADMIN — GABAPENTIN 300 MILLIGRAM(S): 400 CAPSULE ORAL at 07:17

## 2018-11-07 RX ADMIN — OXYCODONE HYDROCHLORIDE 10 MILLIGRAM(S): 5 TABLET ORAL at 07:17

## 2018-11-07 RX ADMIN — OXYCODONE HYDROCHLORIDE 5 MILLIGRAM(S): 5 TABLET ORAL at 14:04

## 2018-11-07 RX ADMIN — OXYCODONE HYDROCHLORIDE 10 MILLIGRAM(S): 5 TABLET ORAL at 22:30

## 2018-11-07 RX ADMIN — Medication 100 MILLIGRAM(S): at 21:39

## 2018-11-07 RX ADMIN — OXYCODONE HYDROCHLORIDE 10 MILLIGRAM(S): 5 TABLET ORAL at 21:38

## 2018-11-07 RX ADMIN — SCOPALAMINE 1 PATCH: 1 PATCH, EXTENDED RELEASE TRANSDERMAL at 07:17

## 2018-11-07 RX ADMIN — Medication 100 MILLIGRAM(S): at 14:09

## 2018-11-07 RX ADMIN — Medication 100 MILLIGRAM(S): at 22:19

## 2018-11-07 RX ADMIN — OXYCODONE HYDROCHLORIDE 5 MILLIGRAM(S): 5 TABLET ORAL at 13:34

## 2018-11-07 RX ADMIN — CELECOXIB 400 MILLIGRAM(S): 200 CAPSULE ORAL at 07:16

## 2018-11-07 NOTE — PHYSICAL THERAPY INITIAL EVALUATION ADULT - GAIT PATTERN USED, PT EVAL
decreased gait velocity and activity tolerance, antalgic gait pattern, increased trever UE support, (+) dizziness resolving c sitting rest break

## 2018-11-07 NOTE — CONSULT NOTE ADULT - ASSESSMENT
This is a 68 y.o female with chronic knee pain for more than 10 yrs , Had Synvisc inj in the past and was taking  Ibuprofen PRN with some relief . Pain was getting worst lately . Now she is s/p R TKA , POD # 0 . L knee planned for future .

## 2018-11-07 NOTE — CONSULT NOTE ADULT - PROBLEM SELECTOR RECOMMENDATION 5
- follows up with cardiologist , asymptomatic     Problem / Plan - Insomnia - continue home meds     Problem / Plan - Hx of Osteoporosis - Continue home meds

## 2018-11-07 NOTE — CONSULT NOTE ADULT - SUBJECTIVE AND OBJECTIVE BOX
PMD : Yeimy   Cardio : Vivek Flanagan     Patient is a 68y old  Female who is s/p R TKA , POD # 0 , tolerated procedure well .     CC: R knee pain     HPI: This is a 68 y.o female with chronic knee pain for more than 10 yrs , Had Synvisc inj in the past and was taking  Ibuprofen PRN with some relief . Pain was getting worst lately . Now she is s/p R TKA , POD # 0 . L knee planned for future .      PAST MEDICAL & SURGICAL HISTORY:  Hx of SVT   Hx of Hepatitis B- as a child   Asthma - mild   Insomnia   S/P cataract extraction and insertion of intraocular lens  S/P tubal ligation  S/P knee surgery: R      Social History:  Tobacco - denies   ETOH - drinks 1 glass of wine daily   Illicit drug abuse - denies    FAMILY HISTORY:  Family history of coronary artery disease (Father)  Family history of malignant hyperthermia (Child)      Allergies    carisoprodol (Hives)    Intolerances        HOME MEDICATIONS :     · 	Advair Diskus 250 mcg-50 mcg inhalation powder: Last Dose Taken:  , 1 puff(s) inhaled once a day  · 	albuterol 90 mcg/inh inhalation aerosol: Last Dose Taken:  , 2 puff(s) inhaled 4 times a day, As Needed  · 	Xanax 0.5 mg oral tablet: Last Dose Taken:  , 1 tab(s) orally once a day (at bedtime), As Needed  · 	Ambien 10 mg oral tablet: Last Dose Taken:  , 1 tab(s) orally once a day (at bedtime), As Needed  · 	Restasis 0.05% ophthalmic emulsion: Last Dose Taken:  , 1 drop(s) to each affected eye once a day (at bedtime)  · 	progesterone: Last Dose Taken:  , vaginal once a day  · 	Allegra-D 12 Hour 60 mg-120 mg oral tablet, extended release: Last Dose Taken:  , 0.5 tab(s) orally every 12 hours, As Needed  · 	Fosamax 70 mg oral tablet: Last Dose Taken:  , 1 tab(s) orally once a week  · 	metaxalone 400 mg oral tablet: Last Dose Taken:  , 2 tab(s) orally 3 to 4 times a day, As Needed  · 	ibuprofen 600 mg oral tablet: Last Dose Taken:  , 1 tab(s) orally every 6 hours, As Needed  · 	Vitamin C 500 mg oral tablet: Last Dose Taken:  , 1 tab(s) orally once a day  · 	EVOA: Last Dose Taken:  , to each affected eye once a day  · 	Glucosamine Chondroitin oral capsule: Last Dose Taken:  , 3 cap(s) orally once a day  · 	bone formula: Last Dose Taken:  , orally once a day  · 	Turmeric: Last Dose Taken:  , 200 milligram(s) orally once a day  · 	Probiotic Formula oral capsule: Last Dose Taken:  , 1 cap(s) orally once a day  · 	Neuro magnesium: Last Dose Taken:  , orally once a day  · 	Melatonin 5 mg oral tablet: Last Dose Taken:  , 1 tab(s) orally once a day (at bedtime), As Needed  · 	A REDS: Last Dose Taken:  , orally once a day  · 	Vitamin D3 10,000 intl units oral capsule: Last Dose Taken:  , 1 cap(s) orally once a week  · 	Zinc 50 mg Pink: Last Dose Taken:  , 1 tab(s) orally every other day  · 	Coenzyme Q10 200 mg oral capsule: Last Dose Taken:  , orally every other day  · 	Toprol-XL: Last Dose Taken:  , 12.5 milligram(s) orally 2 times a day    REVIEW OF SYSTEMS:    CONSTITUTIONAL: No fever, weight loss, or fatigue  EYES: No eye pain, visual disturbances, or discharge  NECK: No pain or stiffness  RESPIRATORY: No cough, wheezing, chills or hemoptysis; No shortness of breath  CARDIOVASCULAR: No chest pain, palpitations, dizziness, or leg swelling  GASTROINTESTINAL: No abdominal or epigastric pain. No nausea, vomiting, or hematemesis; No diarrhea or constipation. No melena or hematochezia.  GENITOURINARY: No dysuria, frequency, hematuria, or incontinence  NEUROLOGICAL: No headaches, memory loss, loss of strength, numbness, or tremors  SKIN: No itching, burning, rashes, or lesions   LYMPH NODES: No enlarged glands  ENDOCRINE: No heat or cold intolerance; No hair loss  MUSCULOSKELETAL:  R knee pain   PSYCHIATRIC: No depression, anxiety, mood swings, or difficulty sleeping  HEME/LYMPH: No easy bruising, or bleeding gums  ALLERGY AND IMMUNOLOGIC: No hives or eczema    MEDICATIONS  (STANDING):  acetaminophen  IVPB .. 1000 milliGRAM(s) IV Intermittent once  buDESOnide 160 MICROgram(s)/formoterol 4.5 MICROgram(s) Inhaler 2 Puff(s) Inhalation two times a day  ceFAZolin   IVPB 2000 milliGRAM(s) IV Intermittent <User Schedule>  docusate sodium 100 milliGRAM(s) Oral three times a day  lactated ringers. 1000 milliLiter(s) (100 mL/Hr) IV Continuous <Continuous>  metoprolol succinate ER 12.5 milliGRAM(s) Oral two times a day  oxyCODONE  ER Tablet 10 milliGRAM(s) Oral once  oxyCODONE  ER Tablet 10 milliGRAM(s) Oral every 12 hours  polyethylene glycol 3350 17 Gram(s) Oral daily  scopolamine   Patch 1 Patch Transdermal once  senna 2 Tablet(s) Oral at bedtime  sodium chloride 0.9% lock flush 3 milliLiter(s) IV Push every 8 hours  sodium chloride 0.9% lock flush 3 milliLiter(s) IV Push every 8 hours  tranexamic acid IVPB 700 milliGRAM(s) IV Intermittent once  tranexamic acid IVPB 700 milliGRAM(s) IV Intermittent once  vancomycin  IVPB 1000 milliGRAM(s) IV Intermittent <User Schedule>    MEDICATIONS  (PRN):  acetaminophen   Tablet .. 975 milliGRAM(s) Oral every 8 hours PRN Temp greater or equal to 38C (100.4F)  ALBUTerol    90 MICROgram(s) HFA Inhaler 1 Puff(s) Inhalation four times a day PRN Shortness of Breath  ALPRAZolam 0.5 milliGRAM(s) Oral at bedtime PRN anxiety  aluminum hydroxide/magnesium hydroxide/simethicone Suspension 30 milliLiter(s) Oral four times a day PRN Indigestion  HYDROmorphone   Tablet 2 milliGRAM(s) Oral every 3 hours PRN Severe Pain (7 - 10)  HYDROmorphone  Injectable 0.5 milliGRAM(s) IV Push every 3 hours PRN Breakthrough pain  ketorolac   Injectable 15 milliGRAM(s) IV Push every 6 hours PRN Moderate Pain (4 - 6)  magnesium hydroxide Suspension 30 milliLiter(s) Oral daily PRN Constipation  ondansetron Injectable 4 milliGRAM(s) IV Push every 6 hours PRN Nausea and/or Vomiting  oxyCODONE    IR 5 milliGRAM(s) Oral every 4 hours PRN Mild Pain (1 - 3)  oxyCODONE    IR 10 milliGRAM(s) Oral every 4 hours PRN Moderate Pain (4 - 6)  zolpidem 5 milliGRAM(s) Oral at bedtime PRN Insomnia      Vital Signs Last 24 Hrs  T(C): 36.5 (07 Nov 2018 16:21), Max: 36.9 (07 Nov 2018 11:45)  T(F): 97.7 (07 Nov 2018 16:21), Max: 98.4 (07 Nov 2018 11:45)  HR: 69 (07 Nov 2018 16:21) (69 - 96)  BP: 99/62 (07 Nov 2018 16:21) (91/63 - 116/70)  BP(mean): --  RR: 18 (07 Nov 2018 16:21) (16 - 25)  SpO2: 97% (07 Nov 2018 16:21) (95% - 100%)    PHYSICAL EXAM:    GENERAL: NAD, well-groomed, well-developed  HEAD:  Atraumatic, Normocephalic  EYES: EOMI, PERRLA, conjunctiva and sclera clear  NECK: Supple, No JVD, Normal thyroid  NERVOUS SYSTEM:  Alert & Oriented X3, Good concentration  HEART: Regular rate and rhythm; No murmurs, rubs, or gallops  ABDOMEN: Soft, Nontender, Nondistended; Bowel sounds present  EXTREMITIES:  2+ Peripheral Pulses, No clubbing, cyanosis, or edema ,   LYMPH: No lymphadenopathy noted  SKIN: No rashes or lesions    LABS: Pending     RADIOLOGY & ADDITIONAL STUDIES:      < from: Xray Knee 1 or 2 Views, Right (11.07.18 @ 11:43) >     EXAM:  KNEE-RIGHT                          PROCEDURE DATE:  11/07/2018          INTERPRETATION:  Right knee    Portable AP and lateral views.    Postoperative evaluation. Status post right knee replacement.    The prosthetic components are in goodposition. The alignment is normal.   Gas and fluid is present in the suprapatellar recess.    Impression: Right total knee replacement in good position                PADMINI LAI M.D., ATTENDING RADIOLOGIST  This document has been electronically signed. Nov 7 2018 11:49AM        < end of copied text >  < from: Xray Knee 1 or 2 Views, Right (11.07.18 @ 11:43) >     EXAM:  KNEE-RIGHT                          PROCEDURE DATE:  11/07/2018          INTERPRETATION:  Right knee    Portable AP and lateral views.    Postoperative evaluation. Status post right knee replacement.    The prosthetic components are in goodposition. The alignment is normal.   Gas and fluid is present in the suprapatellar recess.    Impression: Right total knee replacement in good position        PADMINI LAI M.D., ATTENDING RADIOLOGIST  This document has been electronically signed. Nov 7 2018 11:49AM        < end of copied text >

## 2018-11-07 NOTE — BRIEF OPERATIVE NOTE - PROCEDURE
<<-----Click on this checkbox to enter Procedure Total knee arthroplasty  11/07/2018  Right computer assisted TKR with scar revision  Active  RIANA

## 2018-11-07 NOTE — PHYSICAL THERAPY INITIAL EVALUATION ADULT - ADDITIONAL COMMENTS
owns a RW, SAC, commode, shower chair but does not use them, 2 steps 1 rail to enter. 12 steps 1 rail to bedroom however can stay on first level

## 2018-11-07 NOTE — PROGRESS NOTE ADULT - SUBJECTIVE AND OBJECTIVE BOX
Ortho Post Op Check    Name: MELO BERRIOS    MR #: [id number]    Procedure: right total knee arthroplasty   Surgeon: Umu    Pt comfortable without complaints, pain controlled  Denies CP, SOB, N/V, numbness/tingling     General Exam:  Vital Signs Last 24 Hrs  T(C): 36.7 (11-07-18 @ 19:32), Max: 36.7 (11-07-18 @ 19:32)  T(F): 98.1 (11-07-18 @ 19:32), Max: 98.1 (11-07-18 @ 19:32)  HR: 70 (11-07-18 @ 19:32) (70 - 70)  BP: 106/70 (11-07-18 @ 19:32) (106/70 - 106/70)  BP(mean): --  RR: 18 (11-07-18 @ 19:32) (18 - 18)  SpO2: 98% (11-07-18 @ 19:32) (98% - 98%)    General: Pt Alert and oriented, NAD, controlled pain.  Dressings C/D/I. No bleeding.  Pulses: 2+ dorsalis pedis pulse. Cap refill < 2 sec.  Sensation: Grossly intact to light touch without deficit.  Motor: + EHL/FHL/TA/GS    Post-op X-Ray:    < from: Xray Knee 1 or 2 Views, Right (11.07.18 @ 11:43) >     EXAM:  KNEE-RIGHT                          PROCEDURE DATE:  11/07/2018          INTERPRETATION:  Right knee    Portable AP and lateral views.    Postoperative evaluation. Status post right knee replacement.    The prosthetic components are in goodposition. The alignment is normal.   Gas and fluid is present in the suprapatellar recess.    Impression: Right total knee replacement in good position    PADMINI LAI M.D., ATTENDING RADIOLOGIST  This document has been electronically signed. Nov 7 2018 11:49AM        < end of copied text >    A/P: 68yFemale POD#0 s/p right total knee arthroplasty   - Stable  - Pain Control  - DVT ppx: ASA  - Post op abx: Ancef/Vanco, Keflex  - PT eval done  - Weight bearing status: WBAT

## 2018-11-08 LAB
ANION GAP SERPL CALC-SCNC: 10 MMOL/L — SIGNIFICANT CHANGE UP (ref 5–17)
BUN SERPL-MCNC: 11 MG/DL — SIGNIFICANT CHANGE UP (ref 8–20)
CALCIUM SERPL-MCNC: 8.2 MG/DL — LOW (ref 8.6–10.2)
CHLORIDE SERPL-SCNC: 105 MMOL/L — SIGNIFICANT CHANGE UP (ref 98–107)
CO2 SERPL-SCNC: 26 MMOL/L — SIGNIFICANT CHANGE UP (ref 22–29)
CREAT SERPL-MCNC: 0.55 MG/DL — SIGNIFICANT CHANGE UP (ref 0.5–1.3)
GLUCOSE SERPL-MCNC: 97 MG/DL — SIGNIFICANT CHANGE UP (ref 70–115)
HCT VFR BLD CALC: 38 % — SIGNIFICANT CHANGE UP (ref 37–47)
HGB BLD-MCNC: 12.6 G/DL — SIGNIFICANT CHANGE UP (ref 12–16)
MCHC RBC-ENTMCNC: 32 PG — HIGH (ref 27–31)
MCHC RBC-ENTMCNC: 33.2 G/DL — SIGNIFICANT CHANGE UP (ref 32–36)
MCV RBC AUTO: 96.4 FL — SIGNIFICANT CHANGE UP (ref 81–99)
PLATELET # BLD AUTO: 198 K/UL — SIGNIFICANT CHANGE UP (ref 150–400)
POTASSIUM SERPL-MCNC: 4.6 MMOL/L — SIGNIFICANT CHANGE UP (ref 3.5–5.3)
POTASSIUM SERPL-SCNC: 4.6 MMOL/L — SIGNIFICANT CHANGE UP (ref 3.5–5.3)
RBC # BLD: 3.94 M/UL — LOW (ref 4.4–5.2)
RBC # FLD: 14.2 % — SIGNIFICANT CHANGE UP (ref 11–15.6)
SODIUM SERPL-SCNC: 141 MMOL/L — SIGNIFICANT CHANGE UP (ref 135–145)
WBC # BLD: 7.4 K/UL — SIGNIFICANT CHANGE UP (ref 4.8–10.8)
WBC # FLD AUTO: 7.4 K/UL — SIGNIFICANT CHANGE UP (ref 4.8–10.8)

## 2018-11-08 PROCEDURE — 99232 SBSQ HOSP IP/OBS MODERATE 35: CPT

## 2018-11-08 RX ORDER — CELECOXIB 200 MG/1
1 CAPSULE ORAL
Qty: 42 | Refills: 0 | OUTPATIENT
Start: 2018-11-08 | End: 2018-11-28

## 2018-11-08 RX ORDER — ASPIRIN/CALCIUM CARB/MAGNESIUM 324 MG
1 TABLET ORAL
Qty: 84 | Refills: 0 | OUTPATIENT
Start: 2018-11-08 | End: 2018-12-19

## 2018-11-08 RX ORDER — IBUPROFEN 200 MG
1 TABLET ORAL
Qty: 0 | Refills: 0 | COMMUNITY

## 2018-11-08 RX ORDER — OXYCODONE HYDROCHLORIDE 5 MG/1
1 TABLET ORAL
Qty: 40 | Refills: 0 | OUTPATIENT
Start: 2018-11-08

## 2018-11-08 RX ORDER — SENNOSIDES/DOCUSATE SODIUM 8.6MG-50MG
2 TABLET ORAL
Qty: 20 | Refills: 0 | OUTPATIENT
Start: 2018-11-08 | End: 2018-11-17

## 2018-11-08 RX ADMIN — Medication 12.5 MILLIGRAM(S): at 17:44

## 2018-11-08 RX ADMIN — OXYCODONE HYDROCHLORIDE 10 MILLIGRAM(S): 5 TABLET ORAL at 20:24

## 2018-11-08 RX ADMIN — SODIUM CHLORIDE 3 MILLILITER(S): 9 INJECTION INTRAMUSCULAR; INTRAVENOUS; SUBCUTANEOUS at 16:51

## 2018-11-08 RX ADMIN — POLYETHYLENE GLYCOL 3350 17 GRAM(S): 17 POWDER, FOR SOLUTION ORAL at 14:04

## 2018-11-08 RX ADMIN — Medication 325 MILLIGRAM(S): at 05:20

## 2018-11-08 RX ADMIN — Medication 100 MILLIGRAM(S): at 05:20

## 2018-11-08 RX ADMIN — SODIUM CHLORIDE 125 MILLILITER(S): 9 INJECTION, SOLUTION INTRAVENOUS at 02:36

## 2018-11-08 RX ADMIN — OXYCODONE HYDROCHLORIDE 10 MILLIGRAM(S): 5 TABLET ORAL at 03:30

## 2018-11-08 RX ADMIN — OXYCODONE HYDROCHLORIDE 10 MILLIGRAM(S): 5 TABLET ORAL at 18:15

## 2018-11-08 RX ADMIN — Medication 500 MILLIGRAM(S): at 12:14

## 2018-11-08 RX ADMIN — SENNA PLUS 2 TABLET(S): 8.6 TABLET ORAL at 22:20

## 2018-11-08 RX ADMIN — Medication 12.5 MILLIGRAM(S): at 05:20

## 2018-11-08 RX ADMIN — SODIUM CHLORIDE 3 MILLILITER(S): 9 INJECTION INTRAMUSCULAR; INTRAVENOUS; SUBCUTANEOUS at 05:17

## 2018-11-08 RX ADMIN — OXYCODONE HYDROCHLORIDE 10 MILLIGRAM(S): 5 TABLET ORAL at 17:43

## 2018-11-08 RX ADMIN — OXYCODONE HYDROCHLORIDE 10 MILLIGRAM(S): 5 TABLET ORAL at 07:20

## 2018-11-08 RX ADMIN — LORATADINE 10 MILLIGRAM(S): 10 TABLET ORAL at 12:14

## 2018-11-08 RX ADMIN — Medication 325 MILLIGRAM(S): at 17:44

## 2018-11-08 RX ADMIN — Medication 15 MILLIGRAM(S): at 17:01

## 2018-11-08 RX ADMIN — OXYCODONE HYDROCHLORIDE 10 MILLIGRAM(S): 5 TABLET ORAL at 23:47

## 2018-11-08 RX ADMIN — Medication 500 MILLIGRAM(S): at 17:44

## 2018-11-08 RX ADMIN — Medication 15 MILLIGRAM(S): at 04:36

## 2018-11-08 RX ADMIN — SODIUM CHLORIDE 3 MILLILITER(S): 9 INJECTION INTRAMUSCULAR; INTRAVENOUS; SUBCUTANEOUS at 22:18

## 2018-11-08 RX ADMIN — Medication 15 MILLIGRAM(S): at 10:42

## 2018-11-08 RX ADMIN — Medication 15 MILLIGRAM(S): at 04:50

## 2018-11-08 RX ADMIN — Medication 100 MILLIGRAM(S): at 22:20

## 2018-11-08 RX ADMIN — OXYCODONE HYDROCHLORIDE 10 MILLIGRAM(S): 5 TABLET ORAL at 05:19

## 2018-11-08 RX ADMIN — OXYCODONE HYDROCHLORIDE 10 MILLIGRAM(S): 5 TABLET ORAL at 14:52

## 2018-11-08 RX ADMIN — LORATADINE 10 MILLIGRAM(S): 10 TABLET ORAL at 00:21

## 2018-11-08 RX ADMIN — OXYCODONE HYDROCHLORIDE 10 MILLIGRAM(S): 5 TABLET ORAL at 21:24

## 2018-11-08 RX ADMIN — Medication 15 MILLIGRAM(S): at 17:56

## 2018-11-08 RX ADMIN — Medication 100 MILLIGRAM(S): at 14:06

## 2018-11-08 RX ADMIN — OXYCODONE HYDROCHLORIDE 10 MILLIGRAM(S): 5 TABLET ORAL at 06:19

## 2018-11-08 RX ADMIN — OXYCODONE HYDROCHLORIDE 10 MILLIGRAM(S): 5 TABLET ORAL at 02:31

## 2018-11-08 RX ADMIN — Medication 500 MILLIGRAM(S): at 05:20

## 2018-11-08 RX ADMIN — Medication 15 MILLIGRAM(S): at 10:26

## 2018-11-08 RX ADMIN — OXYCODONE HYDROCHLORIDE 10 MILLIGRAM(S): 5 TABLET ORAL at 14:04

## 2018-11-08 RX ADMIN — Medication 500 MILLIGRAM(S): at 00:23

## 2018-11-08 RX ADMIN — Medication 500 MILLIGRAM(S): at 23:48

## 2018-11-08 NOTE — OCCUPATIONAL THERAPY INITIAL EVALUATION ADULT - ADDITIONAL COMMENTS
Pt has tub with curtain and shower stall with door  Pt owns 2 RWs, shower chair, and commode  Pt is right handed

## 2018-11-08 NOTE — DISCHARGE NOTE ADULT - MEDICATION SUMMARY - MEDICATIONS TO TAKE
I will START or STAY ON the medications listed below when I get home from the hospital:    EVOA  -- to each affected eye once a day  -- Indication: For Home med    bone formula  -- orally once a day  -- Indication: For Home med    Neuro magnesium  -- orally once a day  -- Indication: For Home med    A REDS  -- orally once a day  -- Indication: For Home med    aspirin 325 mg oral delayed release tablet  -- 1 tab(s) by mouth 2 times a day  -- Indication: For clot prevention    celecoxib 200 mg oral capsule  -- 1 cap(s) by mouth 2 times a day  -- Indication: For Pain    oxyCODONE 10 mg oral tablet  -- 1 tab(s) by mouth every 3 hours, As Needed -Moderate Pain (4 - 6) MDD:eight   -- Indication: For Pain    Xanax 0.5 mg oral tablet  -- 1 tab(s) by mouth once a day (at bedtime), As Needed  -- Indication: For Home med    Ambien 10 mg oral tablet  -- 1 tab(s) by mouth once a day (at bedtime), As Needed  -- Indication: For Home med    Toprol-XL  -- 12.5 milligram(s) by mouth 2 times a day  -- Indication: For Home med    Fosamax 70 mg oral tablet  -- 1 tab(s) by mouth once a week  -- Indication: For Home med    Advair Diskus 250 mcg-50 mcg inhalation powder  -- 1 puff(s) inhaled once a day  -- Indication: For Home med    albuterol 90 mcg/inh inhalation aerosol  -- 2 puff(s) inhaled 4 times a day, As Needed  -- Indication: For Home med    cefadroxil 500 mg oral capsule  -- 1 cap(s) by mouth every 12 hours   -- Finish all this medication unless otherwise directed by prescriber.    -- Indication: For infection prevention    Turmeric  -- 200 milligram(s) by mouth once a day  -- Indication: For Home med    Colace 2-in-1 50 mg-8.6 mg oral tablet  -- 2 tab(s) by mouth once a day (at bedtime)   -- Medication should be taken with plenty of water.    -- Indication: For constipation    Zinc 50 mg Pink  -- 1 tab(s) by mouth every other day  -- Indication: For Home med    metaxalone 400 mg oral tablet  -- 2 tab(s) by mouth 3 to 4 times a day, As Needed  -- Indication: For Home med    Glucosamine Chondroitin oral capsule  -- 3 cap(s) by mouth once a day  -- Indication: For Home med    Melatonin 5 mg oral tablet  -- 1 tab(s) by mouth once a day (at bedtime), As Needed  -- Indication: For Home med    Coenzyme Q10 200 mg oral capsule  -- orally every other day  -- Indication: For Home med    Restasis 0.05% ophthalmic emulsion  -- 1 drop(s) to each affected eye once a day (at bedtime)  -- Indication: For Home med    Probiotic Formula oral capsule  -- 1 cap(s) by mouth once a day  -- Indication: For Home med    progesterone  -- vaginal once a day  -- Indication: For Home med    Allegra-D 12 Hour 60 mg-120 mg oral tablet, extended release  -- 0.5 tab(s) by mouth every 12 hours, As Needed  -- Indication: For Home med    Vitamin C 500 mg oral tablet  -- 1 tab(s) by mouth once a day  -- Indication: For Home med    Vitamin D3 10,000 intl units oral capsule  -- 1 cap(s) by mouth once a week  -- Indication: For Home med

## 2018-11-08 NOTE — PROGRESS NOTE ADULT - SUBJECTIVE AND OBJECTIVE BOX
Patient seen and examined . S/p L TKA   , POD # 1    CC : L knee pain       MEDICATIONS  (STANDING):  acetaminophen  IVPB .. 1000 milliGRAM(s) IV Intermittent once  aspirin enteric coated 325 milliGRAM(s) Oral two times a day  buDESOnide 160 MICROgram(s)/formoterol 4.5 MICROgram(s) Inhaler 2 Puff(s) Inhalation two times a day  cephalexin 500 milliGRAM(s) Oral four times a day  docusate sodium 100 milliGRAM(s) Oral three times a day  lactated ringers. 1000 milliLiter(s) (125 mL/Hr) IV Continuous <Continuous>  loratadine 10 milliGRAM(s) Oral daily  metoprolol succinate ER 12.5 milliGRAM(s) Oral two times a day  oxyCODONE  ER Tablet 10 milliGRAM(s) Oral once  oxyCODONE  ER Tablet 10 milliGRAM(s) Oral every 12 hours  polyethylene glycol 3350 17 Gram(s) Oral daily  scopolamine   Patch 1 Patch Transdermal once  senna 2 Tablet(s) Oral at bedtime  sodium chloride 0.9% lock flush 3 milliLiter(s) IV Push every 8 hours  sodium chloride 0.9% lock flush 3 milliLiter(s) IV Push every 8 hours  tranexamic acid IVPB 700 milliGRAM(s) IV Intermittent once  tranexamic acid IVPB 700 milliGRAM(s) IV Intermittent once    MEDICATIONS  (PRN):  acetaminophen   Tablet .. 975 milliGRAM(s) Oral every 8 hours PRN Temp greater or equal to 38C (100.4F)  ALBUTerol    90 MICROgram(s) HFA Inhaler 1 Puff(s) Inhalation four times a day PRN Shortness of Breath  ALPRAZolam 0.5 milliGRAM(s) Oral at bedtime PRN anxiety  aluminum hydroxide/magnesium hydroxide/simethicone Suspension 30 milliLiter(s) Oral four times a day PRN Indigestion  HYDROmorphone   Tablet 2 milliGRAM(s) Oral every 3 hours PRN Severe Pain (7 - 10)  HYDROmorphone  Injectable 0.5 milliGRAM(s) IV Push every 3 hours PRN Breakthrough pain  ketorolac   Injectable 15 milliGRAM(s) IV Push every 6 hours PRN Moderate Pain (4 - 6)  magnesium hydroxide Suspension 30 milliLiter(s) Oral daily PRN Constipation  ondansetron Injectable 4 milliGRAM(s) IV Push every 6 hours PRN Nausea and/or Vomiting  oxyCODONE    IR 5 milliGRAM(s) Oral every 4 hours PRN Mild Pain (1 - 3)  oxyCODONE    IR 10 milliGRAM(s) Oral every 4 hours PRN Moderate Pain (4 - 6)  zolpidem 5 milliGRAM(s) Oral at bedtime PRN Insomnia      LABS:                          12.6   7.4   )-----------( 198      ( 08 Nov 2018 05:50 )             38.0     11-08    141  |  105  |  11.0  ----------------------------<  97  4.6   |  26.0  |  0.55    Ca    8.2<L>      08 Nov 2018 05:50    RADIOLOGY & ADDITIONAL TESTS:    < from: Xray Knee 1 or 2 Views, Right (11.07.18 @ 11:43) >     EXAM:  KNEE-RIGHT                          PROCEDURE DATE:  11/07/2018          INTERPRETATION:  Right knee    Portable AP and lateral views.    Postoperative evaluation. Status post right knee replacement.    The prosthetic components are in goodposition. The alignment is normal.   Gas and fluid is present in the suprapatellar recess.    Impression: Right total knee replacement in good position                PADMINI LAI M.D., ATTENDING RADIOLOGIST  This document has been electronically signed. Nov 7 2018 11:49AM        < end of copied text >        REVIEW OF SYSTEMS:    CONSTITUTIONAL: No fever, weight loss, or fatigue  EYES: No eye pain, visual disturbances, or discharge  ENMT:  No difficulty hearing, tinnitus, vertigo; No sinus or throat pain  NECK: No pain or stiffness  RESPIRATORY: No cough, wheezing, chills or hemoptysis; No shortness of breath  CARDIOVASCULAR: No chest pain, palpitations, dizziness, or leg swelling  GASTROINTESTINAL: No abdominal or epigastric pain. No nausea, vomiting, or hematemesis; No diarrhea or constipation. No melena or hematochezia.  GENITOURINARY: No dysuria, frequency, hematuria, or incontinence  NEUROLOGICAL: No headaches, memory loss, loss of strength, numbness, or tremors  SKIN: No itching, burning, rashes, or lesions   LYMPH NODES: No enlarged glands  ENDOCRINE: No heat or cold intolerance; No hair loss  MUSCULOSKELETAL:   PSYCHIATRIC: No depression, anxiety, mood swings, or difficulty sleeping  HEME/LYMPH: No easy bruising, or bleeding gums  ALLERGY AND IMMUNOLOGIC: No hives or eczema    Vital Signs Last 24 Hrs  T(C): 36.4 (08 Nov 2018 07:36), Max: 37.1 (08 Nov 2018 04:20)  T(F): 97.6 (08 Nov 2018 07:36), Max: 98.7 (08 Nov 2018 04:20)  HR: 94 (08 Nov 2018 07:36) (69 - 96)  BP: 108/67 (08 Nov 2018 07:36) (91/63 - 112/71)  BP(mean): --  RR: 18 (08 Nov 2018 07:36) (17 - 25)  SpO2: 93% (08 Nov 2018 07:36) (93% - 100%)  PHYSICAL EXAM:    GENERAL: NAD, well-groomed, well-developed  HEAD:  Atraumatic, Normocephalic  EYES: EOMI, PERRLA, conjunctiva and sclera clear  NECK: Supple, No JVD, Normal thyroid  NERVOUS SYSTEM:  Alert & Oriented X3, no focal deficit  CHEST/LUNG: CTA b/l ,  no  rales, rhonchi, wheezing, or rubs  HEART: Regular rate and rhythm; No murmurs, rubs, or gallops  ABDOMEN: Soft, Nontender, Nondistended; Bowel sounds present  EXTREMITIES:  2+ Peripheral Pulses, No clubbing, cyanosis, or edema  LYMPH: No lymphadenopathy noted  SKIN: No rashes or lesions

## 2018-11-08 NOTE — PROGRESS NOTE ADULT - SUBJECTIVE AND OBJECTIVE BOX
MELO BERRIOS    [id number]    History: 68y Female is status post right total knee arthroplasty with complex wound closure on 11/7/2018, POD # 1. Patient is doing well and is comfortable. The patient's pain is controlled using the prescribed pain medications. The patient is participating in physical therapy and  out of bed. Denies nausea, vomiting, chest pain, shortness of breath, abdominal pain or fever. No new complaints.                            12.6   7.4   )-----------( 198      ( 08 Nov 2018 05:50 )             38.0     11-08    141  |  105  |  11.0  ----------------------------<  97  4.6   |  26.0  |  0.55    Ca    8.2<L>      08 Nov 2018 05:50        MEDICATIONS  (STANDING):  acetaminophen  IVPB .. 1000 milliGRAM(s) IV Intermittent once  aspirin enteric coated 325 milliGRAM(s) Oral two times a day  buDESOnide 160 MICROgram(s)/formoterol 4.5 MICROgram(s) Inhaler 2 Puff(s) Inhalation two times a day  cephalexin 500 milliGRAM(s) Oral four times a day  docusate sodium 100 milliGRAM(s) Oral three times a day  lactated ringers. 1000 milliLiter(s) (125 mL/Hr) IV Continuous <Continuous>  loratadine 10 milliGRAM(s) Oral daily  metoprolol succinate ER 12.5 milliGRAM(s) Oral two times a day  oxyCODONE  ER Tablet 10 milliGRAM(s) Oral once  oxyCODONE  ER Tablet 10 milliGRAM(s) Oral every 12 hours  polyethylene glycol 3350 17 Gram(s) Oral daily  scopolamine   Patch 1 Patch Transdermal once  senna 2 Tablet(s) Oral at bedtime  sodium chloride 0.9% lock flush 3 milliLiter(s) IV Push every 8 hours  sodium chloride 0.9% lock flush 3 milliLiter(s) IV Push every 8 hours  tranexamic acid IVPB 700 milliGRAM(s) IV Intermittent once  tranexamic acid IVPB 700 milliGRAM(s) IV Intermittent once    MEDICATIONS  (PRN):  acetaminophen   Tablet .. 975 milliGRAM(s) Oral every 8 hours PRN Temp greater or equal to 38C (100.4F)  ALBUTerol    90 MICROgram(s) HFA Inhaler 1 Puff(s) Inhalation four times a day PRN Shortness of Breath  ALPRAZolam 0.5 milliGRAM(s) Oral at bedtime PRN anxiety  aluminum hydroxide/magnesium hydroxide/simethicone Suspension 30 milliLiter(s) Oral four times a day PRN Indigestion  HYDROmorphone   Tablet 2 milliGRAM(s) Oral every 3 hours PRN Severe Pain (7 - 10)  HYDROmorphone  Injectable 0.5 milliGRAM(s) IV Push every 3 hours PRN Breakthrough pain  ketorolac   Injectable 15 milliGRAM(s) IV Push every 6 hours PRN Moderate Pain (4 - 6)  magnesium hydroxide Suspension 30 milliLiter(s) Oral daily PRN Constipation  ondansetron Injectable 4 milliGRAM(s) IV Push every 6 hours PRN Nausea and/or Vomiting  oxyCODONE    IR 5 milliGRAM(s) Oral every 4 hours PRN Mild Pain (1 - 3)  oxyCODONE    IR 10 milliGRAM(s) Oral every 4 hours PRN Moderate Pain (4 - 6)  zolpidem 5 milliGRAM(s) Oral at bedtime PRN Insomnia      Physical exam: The right knee OR dressing is clean, dry and intact. No drainage or discharge. No erythema is noted. No blistering. No ecchymosis. The calf is supple nontender. Passive range of motion is acceptable to due postoperative pain. No calf tenderness. Sensation to light touch is grossly intact distally. Motor function distally is 5/5. Extensor hallucis longus and flexor hallucis longus are intact. No foot drop. 2+ dorsalis pedis pulse. Capillary refill is less than 2 seconds. No cyanosis.    Primary Orthopedic Assessment:  • s/p RIGHT total knee replacement    Secondary  Orthopedic Assessment(s):   •     Secondary  Medical Assessment(s):   History of supraventricular tachycardia: History of supraventricular tachycardia  History of hepatitis B: History of hepatitis B  History of asthma: History of asthma        Plan:   • DVT prophylaxis as prescribed, including use of compression devices and ankle pumps  • Continue physical therapy  • Weightbearing as tolerated of the right lower extremity with assistance of a walker  • Incentive spirometry encouraged  • Pain control as clinically indicated  • Discharge planning – anticipated discharge is Home TOMORROW

## 2018-11-08 NOTE — DISCHARGE NOTE ADULT - PATIENT PORTAL LINK FT
You can access the [facility], offered by [facility], by registering with the following website: [email or web address]

## 2018-11-08 NOTE — DISCHARGE NOTE ADULT - ADDITIONAL INSTRUCTIONS
The patient will be seen in the office in 2 weeks for wound check. Sutures/Staples/Tape will be removed at that time. Patient may shower after post-op day #3. The dressing is to be removed on 7. IF THE DRESSING BECOMES SOILED BEFORE THE REMOVAL DATE, CHANGE WITH A SIMILAR DRESSING. IF THE DRESSING BECOMES STAINED WITH DISCHARGE, CONTACT THE OFFICE FOR FURTHER DIRECTIONS. The patient will contact the office if the wound becomes red, has increasing pain, develops bleeding or discharge, an injury occurs, or has other concerns. The patient will continue PT consistent with total knee replacement. The patient will continue aspirin 325mg twice daily for 6 weeks for blood clot prevention. The patient will take OXYCODONE AND TYLENOL for pain control and titrate according to prescription and patient needs. Patient will continue Duricef 500 mg twice a day to be completed 11/14 for infection prevention. The patient will take Senna while taking oxycodone to prevent narcotic associated constipation.  Additionally, increase water intake (drink at least 8 glasses of water daily) and try adding fiber to the diet by eating fruits, vegetables and foods that are rich in grains. If constipation is experienced, contact the medical/primary care provider to discuss further treatment options. The patient is FULL weight bearing. Elevation of the lower leg is recommended to reduce swelling. The patient will be seen in the office in 2 weeks for wound check. Sutures/Staples/Tape will be removed at that time. Patient may shower after post-op day #3. The dressing is to be removed on 7 or when seen in office. IF THE DRESSING BECOMES SOILED BEFORE THE REMOVAL DATE, CHANGE WITH A SIMILAR DRESSING. IF THE DRESSING BECOMES STAINED WITH DISCHARGE, CONTACT THE OFFICE FOR FURTHER DIRECTIONS. The patient will contact the office if the wound becomes red, has increasing pain, develops bleeding or discharge, an injury occurs, or has other concerns. The patient will continue PT consistent with total knee replacement. The patient will continue aspirin 325mg twice daily for 6 weeks for blood clot prevention. The patient will take OXYCODONE AND TYLENOL for pain control and titrate according to prescription and patient needs. Patient will continue Duricef 500 mg twice a day to be completed 11/14 for infection prevention. The patient will take Senna while taking oxycodone to prevent narcotic associated constipation.  Additionally, increase water intake (drink at least 8 glasses of water daily) and try adding fiber to the diet by eating fruits, vegetables and foods that are rich in grains. If constipation is experienced, contact the medical/primary care provider to discuss further treatment options. The patient is FULL weight bearing. Elevation of the lower leg is recommended to reduce swelling.

## 2018-11-08 NOTE — PROGRESS NOTE ADULT - ASSESSMENT
This is a 68 y.o female with chronic knee pain for more than 10 yrs , Had Synvisc inj in the past and was taking  Ibuprofen PRN with some relief . Pain was getting worst lately . Now she is s/p R TKA , POD # 1 . L knee planned in  future .     Problem/Recommendation - 1:  Problem: Primary osteoarthritis of both knees. Recommendation: S/P R TKA , POD # 1 , L knee planned in future.     Problem/Recommendation - 2:  ·  Problem: Status post right knee replacement.  Recommendation: PT/OT/pain mgmt  DVT prophylaxis- as per ortho  Abx as per SCIP- given   Incentive spirometry  Prophylaxis of opioid  induced constipation.      Problem/Recommendation - 3:  ·  Problem: History of asthma.  Recommendation: - mild and stable , continue Advair / Albuterol PRN.      Problem/Recommendation - 4:  ·  Problem: History of hepatitis B.  Recommendation: - as a child.      Problem/Recommendation - 5:  ·  Problem: History of supraventricular tachycardia.  Recommendation: - follows up with cardiologist , asymptomatic     Problem / Plan - Insomnia - continue home meds     Problem / Plan - Hx of Osteoporosis - Continue home meds.      Problem/Recommendation - 6:  Problem: Prophylactic measure. Recommendation: DVT prophylaxis  - as per ortho protocol - on ASA BID   Opioid induced constipation  prophylaxis - bowel regimen.

## 2018-11-08 NOTE — DISCHARGE NOTE ADULT - CARE PROVIDER_API CALL
Kishore Sanz (MD), Orthopaedic Surgery  [address]  [place] [address]  Phone: [phone]  Fax: [phone]

## 2018-11-08 NOTE — DISCHARGE NOTE ADULT - HOSPITAL COURSE
The patient underwent a RIGHT TOTAL KNEE REPLACEMENT on 11/7/18. The patient received antibiotics consistent with SCIP guidelines. The patient underwent the procedure and had no intra-operative complications. Post-operatively, the patient was seen by medicine and PT. The patient received ASA for DVTP. The patient received pain medications per orthopedic pain managment protocol and the pain was appropriately controlled. The patient did not have any post-operative medical complications. The patient was discharged in stable condition. The patient underwent a RIGHT TOTAL KNEE REPLACEMENT with complex wound closure on 11/7/18. The patient received antibiotics consistent with SCIP guidelines. The patient underwent the procedure and had no intra-operative complications. Post-operatively, the patient was seen by medicine and PT. The patient received ASA for DVTP. The patient received pain medications per orthopedic pain managment protocol and the pain was appropriately controlled. The patient did not have any post-operative medical complications. The patient was discharged in stable condition.

## 2018-11-09 VITALS
SYSTOLIC BLOOD PRESSURE: 110 MMHG | DIASTOLIC BLOOD PRESSURE: 71 MMHG | OXYGEN SATURATION: 95 % | RESPIRATION RATE: 18 BRPM | TEMPERATURE: 98 F | HEART RATE: 88 BPM

## 2018-11-09 LAB
ANION GAP SERPL CALC-SCNC: 11 MMOL/L — SIGNIFICANT CHANGE UP (ref 5–17)
BUN SERPL-MCNC: 9 MG/DL — SIGNIFICANT CHANGE UP (ref 8–20)
CALCIUM SERPL-MCNC: 8.5 MG/DL — LOW (ref 8.6–10.2)
CHLORIDE SERPL-SCNC: 104 MMOL/L — SIGNIFICANT CHANGE UP (ref 98–107)
CO2 SERPL-SCNC: 25 MMOL/L — SIGNIFICANT CHANGE UP (ref 22–29)
CREAT SERPL-MCNC: 0.48 MG/DL — LOW (ref 0.5–1.3)
GLUCOSE SERPL-MCNC: 111 MG/DL — SIGNIFICANT CHANGE UP (ref 70–115)
HCT VFR BLD CALC: 33.8 % — LOW (ref 37–47)
HGB BLD-MCNC: 11.2 G/DL — LOW (ref 12–16)
MCHC RBC-ENTMCNC: 31.6 PG — HIGH (ref 27–31)
MCHC RBC-ENTMCNC: 33.1 G/DL — SIGNIFICANT CHANGE UP (ref 32–36)
MCV RBC AUTO: 95.5 FL — SIGNIFICANT CHANGE UP (ref 81–99)
PLATELET # BLD AUTO: 189 K/UL — SIGNIFICANT CHANGE UP (ref 150–400)
POTASSIUM SERPL-MCNC: 4.2 MMOL/L — SIGNIFICANT CHANGE UP (ref 3.5–5.3)
POTASSIUM SERPL-SCNC: 4.2 MMOL/L — SIGNIFICANT CHANGE UP (ref 3.5–5.3)
RBC # BLD: 3.54 M/UL — LOW (ref 4.4–5.2)
RBC # FLD: 14.1 % — SIGNIFICANT CHANGE UP (ref 11–15.6)
SODIUM SERPL-SCNC: 140 MMOL/L — SIGNIFICANT CHANGE UP (ref 135–145)
WBC # BLD: 7.8 K/UL — SIGNIFICANT CHANGE UP (ref 4.8–10.8)
WBC # FLD AUTO: 7.8 K/UL — SIGNIFICANT CHANGE UP (ref 4.8–10.8)

## 2018-11-09 PROCEDURE — 73560 X-RAY EXAM OF KNEE 1 OR 2: CPT

## 2018-11-09 PROCEDURE — 97116 GAIT TRAINING THERAPY: CPT

## 2018-11-09 PROCEDURE — 97110 THERAPEUTIC EXERCISES: CPT

## 2018-11-09 PROCEDURE — 82962 GLUCOSE BLOOD TEST: CPT

## 2018-11-09 PROCEDURE — 80048 BASIC METABOLIC PNL TOTAL CA: CPT

## 2018-11-09 PROCEDURE — C1713: CPT

## 2018-11-09 PROCEDURE — 97163 PT EVAL HIGH COMPLEX 45 MIN: CPT

## 2018-11-09 PROCEDURE — 86850 RBC ANTIBODY SCREEN: CPT

## 2018-11-09 PROCEDURE — 85027 COMPLETE CBC AUTOMATED: CPT

## 2018-11-09 PROCEDURE — 86901 BLOOD TYPING SEROLOGIC RH(D): CPT

## 2018-11-09 PROCEDURE — 97167 OT EVAL HIGH COMPLEX 60 MIN: CPT

## 2018-11-09 PROCEDURE — 86900 BLOOD TYPING SEROLOGIC ABO: CPT

## 2018-11-09 PROCEDURE — 36415 COLL VENOUS BLD VENIPUNCTURE: CPT

## 2018-11-09 PROCEDURE — 99232 SBSQ HOSP IP/OBS MODERATE 35: CPT

## 2018-11-09 PROCEDURE — C1776: CPT

## 2018-11-09 RX ADMIN — Medication 12.5 MILLIGRAM(S): at 05:53

## 2018-11-09 RX ADMIN — Medication 325 MILLIGRAM(S): at 05:53

## 2018-11-09 RX ADMIN — HYDROMORPHONE HYDROCHLORIDE 2 MILLIGRAM(S): 2 INJECTION INTRAMUSCULAR; INTRAVENOUS; SUBCUTANEOUS at 12:23

## 2018-11-09 RX ADMIN — CELECOXIB 200 MILLIGRAM(S): 200 CAPSULE ORAL at 05:50

## 2018-11-09 RX ADMIN — OXYCODONE HYDROCHLORIDE 10 MILLIGRAM(S): 5 TABLET ORAL at 09:53

## 2018-11-09 RX ADMIN — LORATADINE 10 MILLIGRAM(S): 10 TABLET ORAL at 12:18

## 2018-11-09 RX ADMIN — OXYCODONE HYDROCHLORIDE 10 MILLIGRAM(S): 5 TABLET ORAL at 00:47

## 2018-11-09 RX ADMIN — POLYETHYLENE GLYCOL 3350 17 GRAM(S): 17 POWDER, FOR SOLUTION ORAL at 12:15

## 2018-11-09 RX ADMIN — Medication 500 MILLIGRAM(S): at 12:17

## 2018-11-09 RX ADMIN — Medication 15 MILLIGRAM(S): at 05:54

## 2018-11-09 RX ADMIN — OXYCODONE HYDROCHLORIDE 10 MILLIGRAM(S): 5 TABLET ORAL at 03:38

## 2018-11-09 RX ADMIN — Medication 500 MILLIGRAM(S): at 05:53

## 2018-11-09 RX ADMIN — OXYCODONE HYDROCHLORIDE 10 MILLIGRAM(S): 5 TABLET ORAL at 05:53

## 2018-11-09 RX ADMIN — Medication 100 MILLIGRAM(S): at 05:53

## 2018-11-09 NOTE — PROGRESS NOTE ADULT - ASSESSMENT
This is a 68 y.o female with chronic knee pain for more than 10 yrs , Had Synvisc inj in the past and was taking  Ibuprofen PRN with some relief . Pain was getting worst lately . Now she is s/p R TKA , POD # 2 . L knee planned in  future .     Problem/Recommendation - 1:  Problem: Primary osteoarthritis of both knees. Recommendation: S/P R TKA , POD # 2 , L knee planned in future.     Problem/Recommendation - 2:  ·  Problem: Status post right knee replacement.  Recommendation: PT/OT/pain mgmt  DVT prophylaxis- as per ortho  Abx as per SCIP- given   Incentive spirometry  Prophylaxis of opioid  induced constipation.      Problem/Recommendation - 3:  ·  Problem: History of asthma.  Recommendation: - mild and stable , continue Advair / Albuterol PRN.      Problem/Recommendation - 4:  ·  Problem: History of hepatitis B.  Recommendation: - as a child.      Problem/Recommendation - 5:  ·  Problem: History of supraventricular tachycardia.  Recommendation: - follows up with cardiologist , asymptomatic     Problem / Plan - Insomnia - continue home meds     Problem / Plan - Hx of Osteoporosis - Continue home meds.      Problem/Recommendation - 6:  Problem: Prophylactic measure. Recommendation: DVT prophylaxis  - as per ortho protocol - on ASA BID   Opioid induced constipation  prophylaxis - bowel regimen.    Problem / Plan - 7: Mild drop of H/H - likely ABLA - as a cause of surgical blood lost , patient is asymptomatic . This is a 68 y.o female with chronic knee pain for more than 10 yrs , Had Synvisc inj in the past and was taking  Ibuprofen PRN with some relief . Pain was getting worst lately . Now she is s/p R TKA , POD # 2 . L knee planned in  future .     Problem/Recommendation - 1:  Problem: Primary osteoarthritis of both knees. Recommendation: S/P R TKA , POD # 2 , L knee planned in future.     Problem/Recommendation - 2:  ·  Problem: Status post right knee replacement.  Recommendation: PT/OT/pain mgmt  DVT prophylaxis- as per ortho  Abx as per SCIP- given   Incentive spirometry  Prophylaxis of opioid  induced constipation.      Problem/Recommendation - 3:  ·  Problem: History of asthma.  Recommendation: - mild and stable , continue Advair / Albuterol PRN.      Problem/Recommendation - 4:  ·  Problem: History of hepatitis B.  Recommendation: - as a child.      Problem/Recommendation - 5:  ·  Problem: History of supraventricular tachycardia.  Recommendation: - follows up with cardiologist , asymptomatic     Problem / Plan - Insomnia - continue home meds     Problem / Plan - Hx of Mild  Osteoporosis - Continue home meds , states she takes Fosmax , Calcium ( ? dose ) , vit D  - advised to continue .     Problem/Recommendation - 6:  Problem: Prophylactic measure. Recommendation: DVT prophylaxis  - as per ortho protocol - on ASA BID   Opioid induced constipation  prophylaxis - bowel regimen.    Problem / Plan - 7: Mild drop of H/H - likely ABLA - as a cause of surgical blood lost , patient is asymptomatic .     Medically stable to d/c once cleared by physical therapy / ortho .

## 2018-11-09 NOTE — PROGRESS NOTE ADULT - SUBJECTIVE AND OBJECTIVE BOX
MELO BERRIOS    [id number]    History: 68y Female is status post right total knee arthroplasty on 11/7/2018, POD # 02. Patient is doing well and is comfortable. The patient's pain is controlled using the prescribed pain medications. The patient is participating in physical therapy. Denies nausea, vomiting, chest pain, shortness of breath, abdominal pain or fever. No new complaints.                              11.2   7.8   )-----------( 189      ( 09 Nov 2018 07:22 )             33.8     11-09    140  |  104  |  9.0  ----------------------------<  111  4.2   |  25.0  |  0.48<L>    Ca    8.5<L>      09 Nov 2018 07:22        MEDICATIONS  (STANDING):  acetaminophen  IVPB .. 1000 milliGRAM(s) IV Intermittent once  aspirin enteric coated 325 milliGRAM(s) Oral two times a day  buDESOnide 160 MICROgram(s)/formoterol 4.5 MICROgram(s) Inhaler 2 Puff(s) Inhalation two times a day  celecoxib 200 milliGRAM(s) Oral two times a day  cephalexin 500 milliGRAM(s) Oral four times a day  docusate sodium 100 milliGRAM(s) Oral three times a day  lactated ringers. 1000 milliLiter(s) (125 mL/Hr) IV Continuous <Continuous>  loratadine 10 milliGRAM(s) Oral daily  metoprolol succinate ER 12.5 milliGRAM(s) Oral two times a day  oxyCODONE  ER Tablet 10 milliGRAM(s) Oral once  oxyCODONE  ER Tablet 10 milliGRAM(s) Oral every 12 hours  polyethylene glycol 3350 17 Gram(s) Oral daily  scopolamine   Patch 1 Patch Transdermal once  senna 2 Tablet(s) Oral at bedtime  sodium chloride 0.9% lock flush 3 milliLiter(s) IV Push every 8 hours  sodium chloride 0.9% lock flush 3 milliLiter(s) IV Push every 8 hours  tranexamic acid IVPB 700 milliGRAM(s) IV Intermittent once  tranexamic acid IVPB 700 milliGRAM(s) IV Intermittent once    MEDICATIONS  (PRN):  acetaminophen   Tablet .. 975 milliGRAM(s) Oral every 8 hours PRN Temp greater or equal to 38C (100.4F)  ALBUTerol    90 MICROgram(s) HFA Inhaler 1 Puff(s) Inhalation four times a day PRN Shortness of Breath  ALPRAZolam 0.5 milliGRAM(s) Oral at bedtime PRN anxiety  aluminum hydroxide/magnesium hydroxide/simethicone Suspension 30 milliLiter(s) Oral four times a day PRN Indigestion  bisacodyl Suppository 10 milliGRAM(s) Rectal daily PRN If no bowel movement by postoperative day #2  HYDROmorphone   Tablet 2 milliGRAM(s) Oral every 3 hours PRN Severe Pain (7 - 10)  HYDROmorphone  Injectable 0.5 milliGRAM(s) IV Push every 3 hours PRN Breakthrough pain  magnesium hydroxide Suspension 30 milliLiter(s) Oral daily PRN Constipation  ondansetron Injectable 4 milliGRAM(s) IV Push every 6 hours PRN Nausea and/or Vomiting  oxyCODONE    IR 5 milliGRAM(s) Oral every 4 hours PRN Mild Pain (1 - 3)  oxyCODONE    IR 10 milliGRAM(s) Oral every 4 hours PRN Moderate Pain (4 - 6)  zolpidem 5 milliGRAM(s) Oral at bedtime PRN Insomnia      Physical exam: The right knee dressing is clean, dry and intact.  Dressing removed, sutures in place No active drainage or discharge. No erythema is noted. No blistering. No ecchymosis.  Mepilex dressing placed. The calf is supple nontender.  No calf tenderness. Sensation to light touch is grossly intact distally. Motor function distally is 5/5. Extensor hallucis longus and flexor hallucis longus are intact. No foot drop. 2+ dorsalis pedis pulse. Capillary refill is less than 2 seconds. No cyanosis.    Primary Orthopedic Assessment:  • s/p RIGHT total knee replacement pod #2    Plan:   • DVT prophylaxis as prescribed, including use of compression devices and ankle pumps  • Continue physical therapy  • Weightbearing as tolerated of the right lower extremity with assistance of a walker  • Incentive spirometry encouraged  • Pain control as clinically indicated  • Discharge planning – anticipated discharge is Home today

## 2018-11-09 NOTE — PROGRESS NOTE ADULT - SUBJECTIVE AND OBJECTIVE BOX
Patient seen and examined . S/p L TKA   , POD # 2.     CC : L knee pain       MEDICATIONS  (STANDING):  acetaminophen  IVPB .. 1000 milliGRAM(s) IV Intermittent once  aspirin enteric coated 325 milliGRAM(s) Oral two times a day  buDESOnide 160 MICROgram(s)/formoterol 4.5 MICROgram(s) Inhaler 2 Puff(s) Inhalation two times a day  celecoxib 200 milliGRAM(s) Oral two times a day  cephalexin 500 milliGRAM(s) Oral four times a day  docusate sodium 100 milliGRAM(s) Oral three times a day  lactated ringers. 1000 milliLiter(s) (125 mL/Hr) IV Continuous <Continuous>  loratadine 10 milliGRAM(s) Oral daily  metoprolol succinate ER 12.5 milliGRAM(s) Oral two times a day  oxyCODONE  ER Tablet 10 milliGRAM(s) Oral once  oxyCODONE  ER Tablet 10 milliGRAM(s) Oral every 12 hours  polyethylene glycol 3350 17 Gram(s) Oral daily  scopolamine   Patch 1 Patch Transdermal once  senna 2 Tablet(s) Oral at bedtime  sodium chloride 0.9% lock flush 3 milliLiter(s) IV Push every 8 hours  sodium chloride 0.9% lock flush 3 milliLiter(s) IV Push every 8 hours  tranexamic acid IVPB 700 milliGRAM(s) IV Intermittent once  tranexamic acid IVPB 700 milliGRAM(s) IV Intermittent once    MEDICATIONS  (PRN):  acetaminophen   Tablet .. 975 milliGRAM(s) Oral every 8 hours PRN Temp greater or equal to 38C (100.4F)  ALBUTerol    90 MICROgram(s) HFA Inhaler 1 Puff(s) Inhalation four times a day PRN Shortness of Breath  ALPRAZolam 0.5 milliGRAM(s) Oral at bedtime PRN anxiety  aluminum hydroxide/magnesium hydroxide/simethicone Suspension 30 milliLiter(s) Oral four times a day PRN Indigestion  bisacodyl Suppository 10 milliGRAM(s) Rectal daily PRN If no bowel movement by postoperative day #2  HYDROmorphone   Tablet 2 milliGRAM(s) Oral every 3 hours PRN Severe Pain (7 - 10)  HYDROmorphone  Injectable 0.5 milliGRAM(s) IV Push every 3 hours PRN Breakthrough pain  magnesium hydroxide Suspension 30 milliLiter(s) Oral daily PRN Constipation  ondansetron Injectable 4 milliGRAM(s) IV Push every 6 hours PRN Nausea and/or Vomiting  oxyCODONE    IR 5 milliGRAM(s) Oral every 4 hours PRN Mild Pain (1 - 3)  oxyCODONE    IR 10 milliGRAM(s) Oral every 4 hours PRN Moderate Pain (4 - 6)  zolpidem 5 milliGRAM(s) Oral at bedtime PRN Insomnia      LABS:                          11.2   7.8   )-----------( 189      ( 09 Nov 2018 07:22 )             33.8     11-09    140  |  104  |  9.0  ----------------------------<  111  4.2   |  25.0  |  0.48<L>    Ca    8.5<L>      09 Nov 2018 07:22      REVIEW OF SYSTEMS:    CONSTITUTIONAL: No fever, weight loss, or fatigue  EYES: No eye pain, visual disturbances, or discharge  ENMT:  No difficulty hearing, tinnitus, vertigo; No sinus or throat pain  NECK: No pain or stiffness  RESPIRATORY: No cough, wheezing, chills or hemoptysis; No shortness of breath  CARDIOVASCULAR: No chest pain, palpitations, dizziness, or leg swelling  GASTROINTESTINAL: No abdominal or epigastric pain. No nausea, vomiting, or hematemesis; No diarrhea or constipation. No melena or hematochezia.  GENITOURINARY: No dysuria, frequency, hematuria, or incontinence  NEUROLOGICAL: No headaches, memory loss, loss of strength, numbness, or tremors  SKIN: No itching, burning, rashes, or lesions   LYMPH NODES: No enlarged glands  ENDOCRINE: No heat or cold intolerance; No hair loss  MUSCULOSKELETAL: L knee pain   PSYCHIATRIC: No depression, anxiety, mood swings, or difficulty sleeping  HEME/LYMPH: No easy bruising, or bleeding gums  ALLERGY AND IMMUNOLOGIC: No hives or eczema    Vital Signs Last 24 Hrs  T(C): 36.5 (09 Nov 2018 07:29), Max: 37.7 (09 Nov 2018 04:15)  T(F): 97.7 (09 Nov 2018 07:29), Max: 99.8 (09 Nov 2018 04:15)  HR: 88 (09 Nov 2018 07:29) (72 - 100)  BP: 110/71 (09 Nov 2018 07:29) (104/66 - 116/76)  BP(mean): --  RR: 18 (09 Nov 2018 07:29) (16 - 20)  SpO2: 95% (09 Nov 2018 07:29) (94% - 97%)  PHYSICAL EXAM:    GENERAL: NAD, well-groomed, well-developed  HEAD:  Atraumatic, Normocephalic  EYES: EOMI, PERRLA, conjunctiva and sclera clear  NECK: Supple, No JVD, Normal thyroid  NERVOUS SYSTEM:  Alert & Oriented X3, no focal deficit  CHEST/LUNG: CTA b/l ,  no  rales, rhonchi, wheezing, or rubs  HEART: Regular rate and rhythm; No murmurs, rubs, or gallops  ABDOMEN: Soft, Nontender, Nondistended; Bowel sounds present  EXTREMITIES:  2+ Peripheral Pulses, No clubbing, cyanosis, or edema , L knee dressing +   LYMPH: No lymphadenopathy noted  SKIN: No rashes or lesions Patient seen and examined . S/p L TKA   , POD # 2. Doing well , pain well controlled , no other complaints .     CC : L knee pain Well controlled       MEDICATIONS  (STANDING):  acetaminophen  IVPB .. 1000 milliGRAM(s) IV Intermittent once  aspirin enteric coated 325 milliGRAM(s) Oral two times a day  buDESOnide 160 MICROgram(s)/formoterol 4.5 MICROgram(s) Inhaler 2 Puff(s) Inhalation two times a day  celecoxib 200 milliGRAM(s) Oral two times a day  cephalexin 500 milliGRAM(s) Oral four times a day  docusate sodium 100 milliGRAM(s) Oral three times a day  lactated ringers. 1000 milliLiter(s) (125 mL/Hr) IV Continuous <Continuous>  loratadine 10 milliGRAM(s) Oral daily  metoprolol succinate ER 12.5 milliGRAM(s) Oral two times a day  oxyCODONE  ER Tablet 10 milliGRAM(s) Oral once  oxyCODONE  ER Tablet 10 milliGRAM(s) Oral every 12 hours  polyethylene glycol 3350 17 Gram(s) Oral daily  scopolamine   Patch 1 Patch Transdermal once  senna 2 Tablet(s) Oral at bedtime  sodium chloride 0.9% lock flush 3 milliLiter(s) IV Push every 8 hours  sodium chloride 0.9% lock flush 3 milliLiter(s) IV Push every 8 hours  tranexamic acid IVPB 700 milliGRAM(s) IV Intermittent once  tranexamic acid IVPB 700 milliGRAM(s) IV Intermittent once    MEDICATIONS  (PRN):  acetaminophen   Tablet .. 975 milliGRAM(s) Oral every 8 hours PRN Temp greater or equal to 38C (100.4F)  ALBUTerol    90 MICROgram(s) HFA Inhaler 1 Puff(s) Inhalation four times a day PRN Shortness of Breath  ALPRAZolam 0.5 milliGRAM(s) Oral at bedtime PRN anxiety  aluminum hydroxide/magnesium hydroxide/simethicone Suspension 30 milliLiter(s) Oral four times a day PRN Indigestion  bisacodyl Suppository 10 milliGRAM(s) Rectal daily PRN If no bowel movement by postoperative day #2  HYDROmorphone   Tablet 2 milliGRAM(s) Oral every 3 hours PRN Severe Pain (7 - 10)  HYDROmorphone  Injectable 0.5 milliGRAM(s) IV Push every 3 hours PRN Breakthrough pain  magnesium hydroxide Suspension 30 milliLiter(s) Oral daily PRN Constipation  ondansetron Injectable 4 milliGRAM(s) IV Push every 6 hours PRN Nausea and/or Vomiting  oxyCODONE    IR 5 milliGRAM(s) Oral every 4 hours PRN Mild Pain (1 - 3)  oxyCODONE    IR 10 milliGRAM(s) Oral every 4 hours PRN Moderate Pain (4 - 6)  zolpidem 5 milliGRAM(s) Oral at bedtime PRN Insomnia      LABS:                          11.2   7.8   )-----------( 189      ( 09 Nov 2018 07:22 )             33.8     11-09    140  |  104  |  9.0  ----------------------------<  111  4.2   |  25.0  |  0.48<L>    Ca    8.5<L>      09 Nov 2018 07:22      REVIEW OF SYSTEMS:    CONSTITUTIONAL: No fever, weight loss, or fatigue  EYES: No eye pain, visual disturbances, or discharge  ENMT:  No difficulty hearing, tinnitus, vertigo; No sinus or throat pain  NECK: No pain or stiffness  RESPIRATORY: No cough, wheezing, chills or hemoptysis; No shortness of breath  CARDIOVASCULAR: No chest pain, palpitations, dizziness, or leg swelling  GASTROINTESTINAL: No abdominal or epigastric pain. No nausea, vomiting, or hematemesis; No diarrhea , no BM X 2 days   GENITOURINARY: No dysuria, frequency, hematuria, or incontinence  NEUROLOGICAL: No headaches, memory loss, loss of strength, numbness, or tremors  SKIN: No itching, burning, rashes, or lesions   LYMPH NODES: No enlarged glands  ENDOCRINE: No heat or cold intolerance; No hair loss  MUSCULOSKELETAL: L knee pain well controlled   PSYCHIATRIC: No depression, anxiety, mood swings, or difficulty sleeping  HEME/LYMPH: No easy bruising, or bleeding gums  ALLERGY AND IMMUNOLOGIC: No hives or eczema    Vital Signs Last 24 Hrs  T(C): 36.5 (09 Nov 2018 07:29), Max: 37.7 (09 Nov 2018 04:15)  T(F): 97.7 (09 Nov 2018 07:29), Max: 99.8 (09 Nov 2018 04:15)  HR: 88 (09 Nov 2018 07:29) (72 - 100)  BP: 110/71 (09 Nov 2018 07:29) (104/66 - 116/76)  BP(mean): --  RR: 18 (09 Nov 2018 07:29) (16 - 20)  SpO2: 95% (09 Nov 2018 07:29) (94% - 97%)  PHYSICAL EXAM:    GENERAL: NAD, well-groomed, well-developed  HEAD:  Atraumatic, Normocephalic  EYES: EOMI, PERRLA, conjunctiva and sclera clear  NECK: Supple, No JVD, Normal thyroid  NERVOUS SYSTEM:  Alert & Oriented X3, no focal deficit  CHEST/LUNG: CTA b/l ,  no  rales, rhonchi, wheezing, or rubs  HEART: Regular rate and rhythm; No murmurs, rubs, or gallops  ABDOMEN: Soft, Nontender, Nondistended; Bowel sounds present  EXTREMITIES:  2+ Peripheral Pulses, No clubbing, cyanosis, or edema , L knee dressing + , dry and clean , no ecchymosis / erythema   LYMPH: No lymphadenopathy noted  SKIN: No rashes or lesions

## 2018-11-13 ENCOUNTER — TRANSCRIPTION ENCOUNTER (OUTPATIENT)
Age: 68
End: 2018-11-13

## 2018-11-15 ENCOUNTER — OTHER (OUTPATIENT)
Age: 68
End: 2018-11-15

## 2018-11-16 ENCOUNTER — RX RENEWAL (OUTPATIENT)
Age: 68
End: 2018-11-16

## 2018-11-16 ENCOUNTER — OTHER (OUTPATIENT)
Age: 68
End: 2018-11-16

## 2018-11-16 RX ORDER — OXYCODONE 10 MG/1
10 TABLET ORAL
Qty: 60 | Refills: 0 | Status: DISCONTINUED | COMMUNITY
Start: 2018-11-16 | End: 2018-11-16

## 2018-11-26 ENCOUNTER — APPOINTMENT (OUTPATIENT)
Dept: ORTHOPEDIC SURGERY | Facility: CLINIC | Age: 68
End: 2018-11-26
Payer: MEDICARE

## 2018-11-26 VITALS
DIASTOLIC BLOOD PRESSURE: 73 MMHG | TEMPERATURE: 97.7 F | WEIGHT: 148 LBS | HEART RATE: 79 BPM | HEIGHT: 67 IN | BODY MASS INDEX: 23.23 KG/M2 | SYSTOLIC BLOOD PRESSURE: 115 MMHG

## 2018-11-26 PROCEDURE — 73562 X-RAY EXAM OF KNEE 3: CPT | Mod: RT

## 2018-11-26 PROCEDURE — 99024 POSTOP FOLLOW-UP VISIT: CPT

## 2018-11-28 ENCOUNTER — APPOINTMENT (OUTPATIENT)
Dept: ORTHOPEDIC SURGERY | Facility: CLINIC | Age: 68
End: 2018-11-28

## 2018-12-10 ENCOUNTER — OTHER (OUTPATIENT)
Age: 68
End: 2018-12-10

## 2018-12-11 ENCOUNTER — OTHER (OUTPATIENT)
Age: 68
End: 2018-12-11

## 2018-12-18 ENCOUNTER — FORM ENCOUNTER (OUTPATIENT)
Age: 68
End: 2018-12-18

## 2018-12-20 ENCOUNTER — APPOINTMENT (OUTPATIENT)
Dept: ORTHOPEDIC SURGERY | Facility: CLINIC | Age: 68
End: 2018-12-20
Payer: MEDICARE

## 2018-12-20 VITALS
BODY MASS INDEX: 23.23 KG/M2 | HEIGHT: 67 IN | HEART RATE: 81 BPM | SYSTOLIC BLOOD PRESSURE: 124 MMHG | DIASTOLIC BLOOD PRESSURE: 84 MMHG | WEIGHT: 148 LBS

## 2018-12-20 PROCEDURE — 99024 POSTOP FOLLOW-UP VISIT: CPT

## 2018-12-20 PROCEDURE — 73560 X-RAY EXAM OF KNEE 1 OR 2: CPT | Mod: RT

## 2019-01-03 ENCOUNTER — RX RENEWAL (OUTPATIENT)
Age: 69
End: 2019-01-03

## 2019-01-03 ENCOUNTER — APPOINTMENT (OUTPATIENT)
Dept: ORTHOPEDIC SURGERY | Facility: CLINIC | Age: 69
End: 2019-01-03
Payer: MEDICARE

## 2019-01-03 VITALS
WEIGHT: 148 LBS | HEART RATE: 88 BPM | HEIGHT: 67 IN | BODY MASS INDEX: 23.23 KG/M2 | DIASTOLIC BLOOD PRESSURE: 83 MMHG | SYSTOLIC BLOOD PRESSURE: 122 MMHG

## 2019-01-03 PROCEDURE — 99024 POSTOP FOLLOW-UP VISIT: CPT

## 2019-01-05 ENCOUNTER — OTHER (OUTPATIENT)
Age: 69
End: 2019-01-05

## 2019-01-09 ENCOUNTER — OUTPATIENT (OUTPATIENT)
Dept: OUTPATIENT SERVICES | Facility: HOSPITAL | Age: 69
LOS: 1 days | End: 2019-01-09
Payer: MEDICARE

## 2019-01-09 VITALS
DIASTOLIC BLOOD PRESSURE: 76 MMHG | HEIGHT: 67 IN | RESPIRATION RATE: 16 BRPM | WEIGHT: 154.32 LBS | SYSTOLIC BLOOD PRESSURE: 118 MMHG | TEMPERATURE: 98 F | HEART RATE: 84 BPM

## 2019-01-09 DIAGNOSIS — Z13.89 ENCOUNTER FOR SCREENING FOR OTHER DISORDER: ICD-10-CM

## 2019-01-09 DIAGNOSIS — R00.2 PALPITATIONS: ICD-10-CM

## 2019-01-09 DIAGNOSIS — Z96.651 PRESENCE OF RIGHT ARTIFICIAL KNEE JOINT: Chronic | ICD-10-CM

## 2019-01-09 DIAGNOSIS — Z98.49 CATARACT EXTRACTION STATUS, UNSPECIFIED EYE: Chronic | ICD-10-CM

## 2019-01-09 DIAGNOSIS — M25.561 PAIN IN RIGHT KNEE: ICD-10-CM

## 2019-01-09 DIAGNOSIS — Z98.890 OTHER SPECIFIED POSTPROCEDURAL STATES: Chronic | ICD-10-CM

## 2019-01-09 DIAGNOSIS — Z98.51 TUBAL LIGATION STATUS: Chronic | ICD-10-CM

## 2019-01-09 DIAGNOSIS — Z29.9 ENCOUNTER FOR PROPHYLACTIC MEASURES, UNSPECIFIED: ICD-10-CM

## 2019-01-09 DIAGNOSIS — Z01.818 ENCOUNTER FOR OTHER PREPROCEDURAL EXAMINATION: ICD-10-CM

## 2019-01-09 LAB
ANION GAP SERPL CALC-SCNC: 14 MMOL/L — SIGNIFICANT CHANGE UP (ref 5–17)
BUN SERPL-MCNC: 17 MG/DL — SIGNIFICANT CHANGE UP (ref 8–20)
CALCIUM SERPL-MCNC: 9 MG/DL — SIGNIFICANT CHANGE UP (ref 8.6–10.2)
CHLORIDE SERPL-SCNC: 103 MMOL/L — SIGNIFICANT CHANGE UP (ref 98–107)
CO2 SERPL-SCNC: 23 MMOL/L — SIGNIFICANT CHANGE UP (ref 22–29)
CREAT SERPL-MCNC: 0.63 MG/DL — SIGNIFICANT CHANGE UP (ref 0.5–1.3)
GLUCOSE SERPL-MCNC: 105 MG/DL — SIGNIFICANT CHANGE UP (ref 70–115)
HCT VFR BLD CALC: 43.7 % — SIGNIFICANT CHANGE UP (ref 37–47)
HGB BLD-MCNC: 14.2 G/DL — SIGNIFICANT CHANGE UP (ref 12–16)
MCHC RBC-ENTMCNC: 31.5 PG — HIGH (ref 27–31)
MCHC RBC-ENTMCNC: 32.5 G/DL — SIGNIFICANT CHANGE UP (ref 32–36)
MCV RBC AUTO: 96.9 FL — SIGNIFICANT CHANGE UP (ref 81–99)
PLATELET # BLD AUTO: 263 K/UL — SIGNIFICANT CHANGE UP (ref 150–400)
POTASSIUM SERPL-MCNC: 4.2 MMOL/L — SIGNIFICANT CHANGE UP (ref 3.5–5.3)
POTASSIUM SERPL-SCNC: 4.2 MMOL/L — SIGNIFICANT CHANGE UP (ref 3.5–5.3)
RBC # BLD: 4.51 M/UL — SIGNIFICANT CHANGE UP (ref 4.4–5.2)
RBC # FLD: 14 % — SIGNIFICANT CHANGE UP (ref 11–15.6)
SODIUM SERPL-SCNC: 140 MMOL/L — SIGNIFICANT CHANGE UP (ref 135–145)
WBC # BLD: 4.7 K/UL — LOW (ref 4.8–10.8)
WBC # FLD AUTO: 4.7 K/UL — LOW (ref 4.8–10.8)

## 2019-01-09 PROCEDURE — 85027 COMPLETE CBC AUTOMATED: CPT

## 2019-01-09 PROCEDURE — G0463: CPT

## 2019-01-09 PROCEDURE — 36415 COLL VENOUS BLD VENIPUNCTURE: CPT

## 2019-01-09 PROCEDURE — 80048 BASIC METABOLIC PNL TOTAL CA: CPT

## 2019-01-09 RX ORDER — PROGESTERONE 200 MG/1
0 CAPSULE, LIQUID FILLED ORAL
Qty: 0 | Refills: 0 | COMMUNITY

## 2019-01-09 RX ORDER — CYCLOSPORINE 0.5 MG/ML
1 EMULSION OPHTHALMIC
Qty: 0 | Refills: 0 | COMMUNITY

## 2019-01-09 RX ORDER — SODIUM CHLORIDE 9 MG/ML
3 INJECTION INTRAMUSCULAR; INTRAVENOUS; SUBCUTANEOUS ONCE
Qty: 0 | Refills: 0 | Status: DISCONTINUED | OUTPATIENT
Start: 2019-01-16 | End: 2019-01-31

## 2019-01-09 RX ORDER — ZOLPIDEM TARTRATE 10 MG/1
1 TABLET ORAL
Qty: 0 | Refills: 0 | COMMUNITY

## 2019-01-09 NOTE — H&P PST ADULT - HISTORY OF PRESENT ILLNESS
This is a 68 y.o female who presents  to [facility] today. The pt underwent a right knee replacement in November and has been experiencing constant pain, discoloration, stiffness and inability to move. She is scheduled for a right knee manipulation in the near future.

## 2019-01-09 NOTE — H&P PST ADULT - MUSCULOSKELETAL COMMENTS
RLE circumference greater than LLE.  Small scab noted to right knee with no pain upon palpation Bilateral knee pain

## 2019-01-09 NOTE — H&P PST ADULT - ASSESSMENT
CAPRINI SCORE [CLOT]    AGE RELATED RISK FACTORS                                                       MOBILITY RELATED FACTORS  [    ] Age 41-60 years                                            (1 Point)                 [     ] Bed rest                                                        (1 Point)  [    ] Age: 61-74 years                                           (2 Points)               [    ] Plaster cast                                                   (2 Points)  [    ] Age= 75 years                                              (3 Points)                 [    ] Bed bound for more than 72 hours                 (2 Points)    DISEASE RELATED RISK FACTORS                                               GENDER SPECIFIC FACTORS  [    ] Edema in the lower extremities                       (1 Point)          [    ] Pregnancy                                                     (1 Point)  [    ] Varicose veins                                               (1 Point)                 [    ] Post-partum < 6 weeks                                   (1 Point)             [    ] BMI > 25 Kg/m2                                            (1 Point)                 [    ] Hormonal therapy  or oral contraception          (1 Point)                 [    ] Sepsis (in the previous month)                        (1 Point)           [    ] History of pregnancy complications                 (1 point)  [    ] Pneumonia or serious lung disease                                           [    ] Unexplained or recurrent                     (1 Point)           (in the previous month)                               (1 Point)  [    ] Abnormal pulmonary function test                     (1 Point)                 SURGERY RELATED RISK FACTORS  [    ] Acute myocardial infarction                              (1 Point)           [    ]  Section                                             (1 Point)  [    ] Congestive heart failure (in the previous month)  (1 Point)  [    ] Minor surgery                                                  (1 Point)   [    ] Inflammatory bowel disease                             (1 Point)           [    ] Arthroscopic surgery                                        (2 Points)  [    ] Central venous access                                      (2 Points)            [    ] General surgery lasting more than 45 minutes   (2 Points)       [    ] Stroke (in the previous month)                          (5 Points)        [    ] Elective arthroplasty                                         (5 Points)                                                                                                                                               HEMATOLOGY RELATED FACTORS                                                 TRAUMA RELATED RISK FACTORS  [    ] Prior episodes of VTE                                     (3 Points)               [    ] Fracture of the hip, pelvis, or leg                       (5 Points)  [    ] Positive family history for VTE                         (3 Points)           [    ] Acute spinal cord injury (in the previous month)  (5 Points)  [    ] Prothrombin 75946 A                                     (3 Points)              [    ] Paralysis  (less than 1 month)                             (5 Points)  [    ] Factor V Leiden                                             (3 Points)                 [    ] Multiple Trauma within 1 month                        (5 Points)  [    ] Lupus anticoagulants                                     (3 Points)                                                           [    ] Anticardiolipin antibodies                               (3 Points)                                                       [    ] High homocysteine in the blood                      (3 Points)                                             [ ] Other congenital or acquired thrombophilia      (3 Points)                                                [ ] Heparin induced thrombocytopenia                  (3 Points)                                          Total Score [          ]    OPIOID RISK TOOL    BRENDA EACH BOX THAT APPLIES AND ADD TOTALS AT THE END    FAMILY HISTORY OF SUBSTANCE ABUSE                 FEMALE         MALE                                                Alcohol                            [    ] 1 pt         [     ] 3pts                                               Illegal Drugs                    [    ] 2 pts       [     ] 3pts                                               Rx Drugs                          [      ]4 pts      [     ] 4 pts    PERSONAL HISTORY OF SUBSTANCE ABUSE                                                                                          Alcohol                            [     ] 3 pts       [     ] 3 pts                                               Illegal Drugs                    [     ] 4 pts       [     ] 4 pts                                               Rx Drugs                          [     ] 5 pts       [     ] 5 pts    AGE BETWEEN 16-45 YEARS                                     [     ] 1 pt         [     ] 1 pt    HISTORY OF PREADOLESCENT   SEXUAL ABUSE                                                            [     ] 3 pts        [     ] 0pts    PSYCHOLOGICAL DISEASE                     ADD, OCD, Bipolar, Schizophrenia        [     ] 2 pts        [     ] 2 pts                      Depression                                               [     ] 1 pt          [     ] 1 pt           SCORING TOTAL   (add numbers and type here)              (      )                                     A score of 3 or lower indicated LOW risk for future opioid abuse  A score of 4 to 7 indicated moderate risk for future opioid abuse  A score of 8 or higher indicates a high risk for opioid abuse CAPRINI SCORE [CLOT]    AGE RELATED RISK FACTORS                                                       MOBILITY RELATED FACTORS  [    ] Age 41-60 years                                            (1 Point)                 [     ] Bed rest                                                        (1 Point)  [ x   ] Age: 61-74 years                                           (2 Points)               [    ] Plaster cast                                                   (2 Points)  [    ] Age= 75 years                                              (3 Points)                 [    ] Bed bound for more than 72 hours                 (2 Points)    DISEASE RELATED RISK FACTORS                                               GENDER SPECIFIC FACTORS  [    ] Edema in the lower extremities                       (1 Point)          [    ] Pregnancy                                                     (1 Point)  [    ] Varicose veins                                               (1 Point)                 [    ] Post-partum < 6 weeks                                   (1 Point)             [    ] BMI > 25 Kg/m2                                            (1 Point)                 [    ] Hormonal therapy  or oral contraception          (1 Point)                 [    ] Sepsis (in the previous month)                        (1 Point)           [    ] History of pregnancy complications                 (1 point)  [    ] Pneumonia or serious lung disease                                           [    ] Unexplained or recurrent                     (1 Point)           (in the previous month)                               (1 Point)  [    ] Abnormal pulmonary function test                     (1 Point)                 SURGERY RELATED RISK FACTORS  [    ] Acute myocardial infarction                              (1 Point)           [    ]  Section                                             (1 Point)  [    ] Congestive heart failure (in the previous month)  (1 Point)  [    ] Minor surgery                                                  (1 Point)   [    ] Inflammatory bowel disease                             (1 Point)           [    ] Arthroscopic surgery                                        (2 Points)  [    ] Central venous access                                      (2 Points)            [  x  ] General surgery lasting more than 45 minutes   (2 Points)       [    ] Stroke (in the previous month)                          (5 Points)        [    ] Elective arthroplasty                                         (5 Points)                                                                                                                                               HEMATOLOGY RELATED FACTORS                                                 TRAUMA RELATED RISK FACTORS  [    ] Prior episodes of VTE                                     (3 Points)               [    ] Fracture of the hip, pelvis, or leg                       (5 Points)  [    ] Positive family history for VTE                         (3 Points)           [    ] Acute spinal cord injury (in the previous month)  (5 Points)  [    ] Prothrombin 83530 A                                     (3 Points)              [    ] Paralysis  (less than 1 month)                             (5 Points)  [    ] Factor V Leiden                                             (3 Points)                 [    ] Multiple Trauma within 1 month                        (5 Points)  [    ] Lupus anticoagulants                                     (3 Points)                                                           [    ] Anticardiolipin antibodies                               (3 Points)                                                       [    ] High homocysteine in the blood                      (3 Points)                                             [ ] Other congenital or acquired thrombophilia      (3 Points)                                                [ ] Heparin induced thrombocytopenia                  (3 Points)                                          Total Score [      4    ]    OPIOID RISK TOOL    BRENDA EACH BOX THAT APPLIES AND ADD TOTALS AT THE END    FAMILY HISTORY OF SUBSTANCE ABUSE                 FEMALE         MALE                                                Alcohol                            [    ] 1 pt         [     ] 3pts                                               Illegal Drugs                    [    ] 2 pts       [     ] 3pts                                               Rx Drugs                          [      ]4 pts      [     ] 4 pts    PERSONAL HISTORY OF SUBSTANCE ABUSE                                                                                          Alcohol                            [     ] 3 pts       [     ] 3 pts                                               Illegal Drugs                    [     ] 4 pts       [     ] 4 pts                                               Rx Drugs                          [     ] 5 pts       [     ] 5 pts    AGE BETWEEN 16-45 YEARS                                     [     ] 1 pt         [     ] 1 pt    HISTORY OF PREADOLESCENT   SEXUAL ABUSE                                                            [     ] 3 pts        [     ] 0pts    PSYCHOLOGICAL DISEASE                     ADD, OCD, Bipolar, Schizophrenia        [     ] 2 pts        [     ] 2 pts                      Depression                                               [     ] 1 pt          [     ] 1 pt           SCORING TOTAL   (add numbers and type here)              (  0    )                                     A score of 3 or lower indicated LOW risk for future opioid abuse  A score of 4 to 7 indicated moderate risk for future opioid abuse  A score of 8 or higher indicates a high risk for opioid abuse

## 2019-01-09 NOTE — H&P PST ADULT - PSH
S/P cataract extraction and insertion of intraocular lens    S/P knee surgery  R  S/P tubal ligation History of total right knee replacement  11/2018  S/P cataract extraction and insertion of intraocular lens    S/P knee surgery  R  S/P tubal ligation

## 2019-01-15 ENCOUNTER — FORM ENCOUNTER (OUTPATIENT)
Age: 69
End: 2019-01-15

## 2019-01-16 ENCOUNTER — APPOINTMENT (OUTPATIENT)
Dept: ORTHOPEDIC SURGERY | Facility: HOSPITAL | Age: 69
End: 2019-01-16

## 2019-01-16 ENCOUNTER — OUTPATIENT (OUTPATIENT)
Dept: INPATIENT UNIT | Facility: HOSPITAL | Age: 69
LOS: 1 days | End: 2019-01-16
Payer: MEDICARE

## 2019-01-16 VITALS
DIASTOLIC BLOOD PRESSURE: 71 MMHG | SYSTOLIC BLOOD PRESSURE: 117 MMHG | HEART RATE: 81 BPM | HEIGHT: 67 IN | TEMPERATURE: 98 F | RESPIRATION RATE: 16 BRPM | OXYGEN SATURATION: 100 % | WEIGHT: 149.91 LBS

## 2019-01-16 VITALS
OXYGEN SATURATION: 99 % | TEMPERATURE: 98 F | DIASTOLIC BLOOD PRESSURE: 78 MMHG | RESPIRATION RATE: 16 BRPM | HEART RATE: 85 BPM | SYSTOLIC BLOOD PRESSURE: 111 MMHG

## 2019-01-16 DIAGNOSIS — Z98.51 TUBAL LIGATION STATUS: Chronic | ICD-10-CM

## 2019-01-16 DIAGNOSIS — Z96.651 PRESENCE OF RIGHT ARTIFICIAL KNEE JOINT: Chronic | ICD-10-CM

## 2019-01-16 DIAGNOSIS — Z98.49 CATARACT EXTRACTION STATUS, UNSPECIFIED EYE: Chronic | ICD-10-CM

## 2019-01-16 DIAGNOSIS — Z98.890 OTHER SPECIFIED POSTPROCEDURAL STATES: Chronic | ICD-10-CM

## 2019-01-16 DIAGNOSIS — M25.561 PAIN IN RIGHT KNEE: ICD-10-CM

## 2019-01-16 PROCEDURE — 73560 X-RAY EXAM OF KNEE 1 OR 2: CPT | Mod: 26,RT

## 2019-01-16 PROCEDURE — 27570 FIXATION OF KNEE JOINT: CPT | Mod: RT

## 2019-01-16 PROCEDURE — ZZZZZ: CPT

## 2019-01-16 PROCEDURE — 73560 X-RAY EXAM OF KNEE 1 OR 2: CPT

## 2019-01-16 PROCEDURE — 27570 FIXATION OF KNEE JOINT: CPT | Mod: 78,RT

## 2019-01-16 RX ORDER — OXYCODONE AND ACETAMINOPHEN 5; 325 MG/1; MG/1
2 TABLET ORAL EVERY 6 HOURS
Qty: 0 | Refills: 0 | Status: DISCONTINUED | OUTPATIENT
Start: 2019-01-16 | End: 2019-01-16

## 2019-01-16 RX ORDER — ALBUTEROL 90 UG/1
2 AEROSOL, METERED ORAL
Qty: 0 | Refills: 0 | COMMUNITY

## 2019-01-16 RX ORDER — ONDANSETRON 8 MG/1
4 TABLET, FILM COATED ORAL ONCE
Qty: 0 | Refills: 0 | Status: DISCONTINUED | OUTPATIENT
Start: 2019-01-16 | End: 2019-01-16

## 2019-01-16 RX ORDER — OXYCODONE AND ACETAMINOPHEN 5; 325 MG/1; MG/1
1 TABLET ORAL EVERY 6 HOURS
Qty: 0 | Refills: 0 | Status: DISCONTINUED | OUTPATIENT
Start: 2019-01-16 | End: 2019-01-16

## 2019-01-16 RX ORDER — FENTANYL CITRATE 50 UG/ML
50 INJECTION INTRAVENOUS ONCE
Qty: 0 | Refills: 0 | Status: DISCONTINUED | OUTPATIENT
Start: 2019-01-16 | End: 2019-01-16

## 2019-01-16 RX ORDER — FENTANYL CITRATE 50 UG/ML
50 INJECTION INTRAVENOUS
Qty: 0 | Refills: 0 | Status: COMPLETED | OUTPATIENT
Start: 2019-01-16 | End: 2019-01-16

## 2019-01-16 RX ORDER — OXYCODONE HYDROCHLORIDE 5 MG/1
1 TABLET ORAL
Qty: 40 | Refills: 0
Start: 2019-01-16

## 2019-01-16 RX ORDER — SODIUM CHLORIDE 9 MG/ML
1000 INJECTION, SOLUTION INTRAVENOUS
Qty: 0 | Refills: 0 | Status: DISCONTINUED | OUTPATIENT
Start: 2019-01-16 | End: 2019-01-16

## 2019-01-16 RX ORDER — FLUTICASONE PROPIONATE AND SALMETEROL 50; 250 UG/1; UG/1
1 POWDER ORAL; RESPIRATORY (INHALATION)
Qty: 0 | Refills: 0 | COMMUNITY

## 2019-01-16 RX ADMIN — SODIUM CHLORIDE 150 MILLILITER(S): 9 INJECTION, SOLUTION INTRAVENOUS at 08:32

## 2019-01-16 RX ADMIN — OXYCODONE AND ACETAMINOPHEN 2 TABLET(S): 5; 325 TABLET ORAL at 08:57

## 2019-01-16 RX ADMIN — FENTANYL CITRATE 50 MICROGRAM(S): 50 INJECTION INTRAVENOUS at 08:58

## 2019-01-16 RX ADMIN — OXYCODONE AND ACETAMINOPHEN 2 TABLET(S): 5; 325 TABLET ORAL at 09:30

## 2019-01-16 RX ADMIN — FENTANYL CITRATE 50 MICROGRAM(S): 50 INJECTION INTRAVENOUS at 08:30

## 2019-01-16 RX ADMIN — FENTANYL CITRATE 50 MICROGRAM(S): 50 INJECTION INTRAVENOUS at 08:50

## 2019-01-16 NOTE — PHYSICAL THERAPY INITIAL EVALUATION ADULT - PERTINENT HX OF CURRENT PROBLEM, REHAB EVAL
Pt with hx of right TKA presents to [facility] with reports of stiffness and difficulty achieving full ROM

## 2019-01-16 NOTE — BRIEF OPERATIVE NOTE - PROCEDURE
<<-----Click on this checkbox to enter Procedure Manipulation of knee joint under anesthesia  01/16/2019  Right  Active  RIANA

## 2019-01-16 NOTE — ASU DISCHARGE PLAN (ADULT/PEDIATRIC). - MEDICATION SUMMARY - MEDICATIONS TO CHANGE
I will SWITCH the dose or number of times a day I take the medications listed below when I get home from the hospital:    oxyCODONE 10 mg oral tablet  -- 1 tab(s) by mouth every 3 hours, As Needed -Moderate Pain (4 - 6) MDD:eight

## 2019-01-16 NOTE — ASU DISCHARGE PLAN (ADULT/PEDIATRIC). - MEDICATION SUMMARY - MEDICATIONS TO TAKE
I will START or STAY ON the medications listed below when I get home from the hospital:    EVOA  -- to each affected eye once a day  -- Indication: For home med    bone formula  -- orally once a day  -- Indication: For home med    Neuro magnesium  -- orally once a day  -- Indication: For home med    oxyCODONE 5 mg oral tablet  -- 1-2 tab(s) by mouth every 4 to 6 hours MDD:8  -- Caution federal law prohibits the transfer of this drug to any person other  than the person for whom it was prescribed.  It is very important that you take or use this exactly as directed.  Do not skip doses or discontinue unless directed by your doctor.  May cause drowsiness.  Alcohol may intensify this effect.  Use care when operating dangerous machinery.  This prescription cannot be refilled.  Using more of this medication than prescribed may cause serious breathing problems.    -- Indication: For pain    Xanax 0.5 mg oral tablet  -- 1 tab(s) by mouth once a day (at bedtime), As Needed  -- Indication: For home med    Toprol-XL  -- 12.5 milligram(s) by mouth 2 times a day  -- Indication: For home med    Fosamax 70 mg oral tablet  -- 1 tab(s) by mouth once a week  -- Indication: For home med    Advair Diskus 250 mcg-50 mcg inhalation powder  -- 1 puff(s) inhaled once a day  -- Indication: For home med    albuterol 90 mcg/inh inhalation aerosol  -- 2 puff(s) inhaled 4 times a day, As Needed  -- Indication: For home med    Turmeric  -- 200 milligram(s) by mouth once a day  -- Indication: For home med    Zinc 50 mg Pink  -- 1 tab(s) by mouth every other day  -- Indication: For home med    metaxalone 400 mg oral tablet  -- 2 tab(s) by mouth 3 to 4 times a day, As Needed  -- Indication: For home med    Glucosamine Chondroitin oral capsule  -- 3 cap(s) by mouth once a day  -- Indication: For home med    Coenzyme Q10 200 mg oral capsule  -- orally every other day  -- Indication: For home med    Melatonin 5 mg oral tablet  -- 1 tab(s) by mouth once a day (at bedtime), As Needed  -- Indication: For home med    Probiotic Formula oral capsule  -- 1 cap(s) by mouth once a day  -- Indication: For home med    progesterone  -- Apply on skin to affected area once a day  -- Indication: For home med    Allegra-D 12 Hour 60 mg-120 mg oral tablet, extended release  -- 0.5 tab(s) by mouth every 12 hours, As Needed  -- Indication: For home med    Vitamin C 500 mg oral tablet  -- 1 tab(s) by mouth once a day  -- Indication: For home med    Vitamin D3 10,000 intl units oral capsule  -- 1 cap(s) by mouth once a week  -- Indication: For home med

## 2019-01-16 NOTE — PHYSICAL THERAPY INITIAL EVALUATION ADULT - ADDITIONAL COMMENTS
pt reporting 2 steps to enter the home with a single hand rail. Pt owns and uses a RW. Pt reporting she will be starting outpatient PT tomorrow and was given a CPM machine with instructions.

## 2019-01-16 NOTE — PHYSICAL THERAPY INITIAL EVALUATION ADULT - ASSISTIVE DEVICE FOR STAIR TRANSFER, REHAB EVAL
single hand rail, [employer]-pt reporting this is how she prefers to negotiate the stairs at home as she feels more safe

## 2019-01-31 ENCOUNTER — APPOINTMENT (OUTPATIENT)
Dept: ORTHOPEDIC SURGERY | Facility: CLINIC | Age: 69
End: 2019-01-31
Payer: MEDICARE

## 2019-01-31 VITALS
HEART RATE: 80 BPM | HEIGHT: 67 IN | BODY MASS INDEX: 23.23 KG/M2 | WEIGHT: 148 LBS | SYSTOLIC BLOOD PRESSURE: 117 MMHG | DIASTOLIC BLOOD PRESSURE: 79 MMHG

## 2019-01-31 DIAGNOSIS — Z96.659 PRESENCE OF UNSPECIFIED ARTIFICIAL KNEE JOINT: ICD-10-CM

## 2019-01-31 DIAGNOSIS — Z96.651 AFTERCARE FOLLOWING JOINT REPLACEMENT SURGERY: ICD-10-CM

## 2019-01-31 DIAGNOSIS — Z47.1 AFTERCARE FOLLOWING JOINT REPLACEMENT SURGERY: ICD-10-CM

## 2019-01-31 PROCEDURE — 99024 POSTOP FOLLOW-UP VISIT: CPT

## 2019-01-31 RX ORDER — OXYCODONE 10 MG/1
10 TABLET ORAL
Qty: 60 | Refills: 0 | Status: DISCONTINUED | COMMUNITY
Start: 2018-11-16 | End: 2019-01-31

## 2019-01-31 RX ORDER — METAXALONE 800 MG/1
TABLET ORAL
Refills: 0 | Status: ACTIVE | COMMUNITY

## 2019-01-31 RX ORDER — FEXOFENADINE AND PSEUDOEPHEDRINE 60; 60 MG/1; MG/1
60-120 TABLET, EXTENDED RELEASE ORAL
Refills: 0 | Status: ACTIVE | COMMUNITY

## 2019-01-31 RX ORDER — METOPROLOL SUCCINATE 25 MG/1
25 TABLET, EXTENDED RELEASE ORAL
Refills: 0 | Status: ACTIVE | COMMUNITY

## 2019-01-31 NOTE — ADDENDUM
[FreeTextEntry1] : This note was authored by Last Collins working as a medical scribe for Dr. Kishore Sanz. The note was reviewed, edited, and revised by Dr. Kishore Sanz whom is in agreement with the exam findings, imaging findings, and treatment plan. 01/31/2019.

## 2019-01-31 NOTE — HISTORY OF PRESENT ILLNESS
[Doing Well] : is doing well [Excellent Pain Control] : has excellent pain control [No Sign of Infection] : is showing no signs of infection [Chills] : no chills [Constipation] : no constipation [Diarrhea] : no diarrhea [Dysuria] : no dysuria [Fever] : no fever [Nausea] : no nausea [Vomiting] : no vomiting [Erythema] : not erythematous [Discharge] : absent of discharge [Dehiscence] : not dehisced [de-identified] : S/P Right TKR manipulation under anesthesia, DOS: 1/16/19.\par S/P Right Computer-assisted TKR DOS:11/07/18. \par  [de-identified] : The patient is a 68 year old female 2 weeks s/p right TKR manipulation. She is ambulating and transferring well with a cane. She reports 100 degrees of flexion of her knee in physical therapy. She reports frequently using the bike and stretching the knee to increase flexion. She is using a CPM machine.  She reports overall that her knee is feeling better and her pain is better.  [de-identified] : Exam of the right knee shows a well healed incision except a small residual scab at the mid-incision. ROM shows -3 to 85 degrees of flexion measured with a goniometer. 5/5 motor strength bilaterally distally. Sensation intact distally.  [de-identified] : Xray- 3 views of the right knee - well aligned and well fixed right knee replacement.  [de-identified] : \par \par   [de-identified] : The patient is a 68 year old female 2 weeks s/p right TKR manipulation.  Although her knee is feeling better she is still struggling with improving the range of motion.  She reports 100° of knee flexion at physical therapy however in the office today she only has 85°.  We were able to achieve 129° of knee flexion in the operating room during the manipulation. She will continue outpatient physical therapy and a prescription was given for that. Importance of knee flexion and knee extension was reinforced to the patient again.  She will followup in 4 weeks for repeat evaluation.

## 2019-02-05 ENCOUNTER — FORM ENCOUNTER (OUTPATIENT)
Age: 69
End: 2019-02-05

## 2019-03-01 ENCOUNTER — APPOINTMENT (OUTPATIENT)
Dept: ORTHOPEDIC SURGERY | Facility: CLINIC | Age: 69
End: 2019-03-01
Payer: MEDICARE

## 2019-03-01 ENCOUNTER — OTHER (OUTPATIENT)
Age: 69
End: 2019-03-01

## 2019-03-01 VITALS
BODY MASS INDEX: 23.23 KG/M2 | SYSTOLIC BLOOD PRESSURE: 122 MMHG | DIASTOLIC BLOOD PRESSURE: 79 MMHG | HEART RATE: 85 BPM | HEIGHT: 67 IN | WEIGHT: 148 LBS

## 2019-03-01 PROCEDURE — 99213 OFFICE O/P EST LOW 20 MIN: CPT

## 2019-03-01 PROCEDURE — 73562 X-RAY EXAM OF KNEE 3: CPT | Mod: TC,RT

## 2019-03-01 NOTE — REASON FOR VISIT
[Follow-Up Visit] : a follow-up visit for [Other: ____] : [unfilled] [FreeTextEntry2] : S/P Right TKR manipulation under anesthesia, DOS: 1/16/19.\par S/P Right Computer-assisted TKR DOS:11/07/18. \par

## 2019-03-01 NOTE — HISTORY OF PRESENT ILLNESS
[de-identified] : This 68-year-old female presents for followup of her right TKR. She is now 4 months status post right TKR and 6 weeks status post manipulation under anesthesia. She has been continuing with physical therapy. She feels overall improvement to the knee with greater ease of motion. She continues to note some discomfort. She will take oxycodone prior to physical therapy. She takes Advil maximum 3 times per day. She continues to ice and heat the knee. She reports developing several blisters over the anterior aspect of the knee and along the incision after heat and a rigorous physical therapy session. She denies systemic symptoms of fever or chills. She is no longer using her CPM machine. She continues to perform her home exercise program. She is now transferring and ambulating independently. She will use a cane when fatigue. Patient requires verbal cueing for posture and gait.

## 2019-03-01 NOTE — PHYSICAL EXAM
[de-identified] : The right knee is without erythema, abrasions, or ecchymosis. Patient is noted to have 2 -2 mm blisters on the central aspect of the incision. Patient reports that she saw some mild drainage and small opening on the incision and applied two Steri-Strips. Steri-Strips are intact. There are no visual openings along the incision. There is no drainage. There are no signs or symptoms of infection. There is no palpable fluctuance to the knee. There is a 1 inch diameter blister noted lateral to the central incision. This is intact without signs or symptoms of infection. Mild effusion. Functional range of motion as measured by goniometer from -3-95°. No gross instability. Extensor mechanism is intact. Quadriceps strength is adequate. Is nontender and Homans test is negative. [de-identified] : X-ray-3 views of the right knee show the prosthesis to be in satisfactory alignment with a quiet bone, cement, implant interface. Patella is midline.

## 2019-03-01 NOTE — PHYSICAL EXAM
[de-identified] : The right knee is without erythema, abrasions, or ecchymosis. Patient is noted to have 2 -2 mm blisters on the central aspect of the incision. Patient reports that she saw some mild drainage and small opening on the incision and applied two Steri-Strips. Steri-Strips are intact. There are no visual openings along the incision. There is no drainage. There are no signs or symptoms of infection. There is no palpable fluctuance to the knee. There is a 1 inch diameter blister noted lateral to the central incision. This is intact without signs or symptoms of infection. Mild effusion. Functional range of motion as measured by goniometer from -3-95°. No gross instability. Extensor mechanism is intact. Quadriceps strength is adequate. Is nontender and Homans test is negative. [de-identified] : X-ray-3 views of the right knee show the prosthesis to be in satisfactory alignment with a quiet bone, cement, implant interface. Patella is midline.

## 2019-03-01 NOTE — DISCUSSION/SUMMARY
[de-identified] : This is a 68-year-old female 4 months status post right TKR and 6 weeks status post manipulation under anesthesia. She is noting overall improvement. She is going to continue with physical therapy. She was strongly encouraged to continue with her home exercise program with anemphasis on knee extension and flexion. We discussed avoiding heat to the knee. We discussed proper blister care. She was made aware of any changes in her clinical condition that would warrant urgent evaluation or intervention. Followup in one week for reevaluation of the blister areas. She notes good understanding and agreement with the plan of care.

## 2019-03-01 NOTE — DISCUSSION/SUMMARY
[de-identified] : This is a 68-year-old female 4 months status post right TKR and 6 weeks status post manipulation under anesthesia. She is noting overall improvement. She is going to continue with physical therapy. She was strongly encouraged to continue with her home exercise program with anemphasis on knee extension and flexion. We discussed avoiding heat to the knee. We discussed proper blister care. She was made aware of any changes in her clinical condition that would warrant urgent evaluation or intervention. Followup in one week for reevaluation of the blister areas. She notes good understanding and agreement with the plan of care.

## 2019-03-01 NOTE — HISTORY OF PRESENT ILLNESS
[de-identified] : This 68-year-old female presents for followup of her right TKR. She is now 4 months status post right TKR and 6 weeks status post manipulation under anesthesia. She has been continuing with physical therapy. She feels overall improvement to the knee with greater ease of motion. She continues to note some discomfort. She will take oxycodone prior to physical therapy. She takes Advil maximum 3 times per day. She continues to ice and heat the knee. She reports developing several blisters over the anterior aspect of the knee and along the incision after heat and a rigorous physical therapy session. She denies systemic symptoms of fever or chills. She is no longer using her CPM machine. She continues to perform her home exercise program. She is now transferring and ambulating independently. She will use a cane when fatigue. Patient requires verbal cueing for posture and gait.

## 2019-03-11 ENCOUNTER — APPOINTMENT (OUTPATIENT)
Dept: ORTHOPEDIC SURGERY | Facility: CLINIC | Age: 69
End: 2019-03-11
Payer: MEDICARE

## 2019-03-11 ENCOUNTER — TRANSCRIPTION ENCOUNTER (OUTPATIENT)
Age: 69
End: 2019-03-11

## 2019-03-11 VITALS
WEIGHT: 150 LBS | HEIGHT: 67 IN | BODY MASS INDEX: 23.54 KG/M2 | HEART RATE: 97 BPM | DIASTOLIC BLOOD PRESSURE: 80 MMHG | SYSTOLIC BLOOD PRESSURE: 126 MMHG

## 2019-03-11 PROCEDURE — 99213 OFFICE O/P EST LOW 20 MIN: CPT

## 2019-03-11 RX ORDER — OXYCODONE HYDROCHLORIDE 5 MG/1
5 CAPSULE ORAL EVERY 8 HOURS
Qty: 12 | Refills: 0 | Status: ACTIVE | COMMUNITY
Start: 2019-03-11 | End: 1900-01-01

## 2019-03-11 RX ORDER — OXYCODONE 5 MG/1
5 TABLET ORAL
Refills: 0 | Status: ACTIVE | COMMUNITY

## 2019-03-11 RX ORDER — CELECOXIB 200 MG/1
200 CAPSULE ORAL TWICE DAILY
Qty: 60 | Refills: 0 | Status: DISCONTINUED | COMMUNITY
Start: 2018-11-26 | End: 2019-03-11

## 2019-03-11 RX ORDER — OXYCODONE 10 MG/1
10 TABLET ORAL
Qty: 40 | Refills: 0 | Status: DISCONTINUED | COMMUNITY
Start: 2018-12-10 | End: 2019-03-11

## 2019-03-11 NOTE — DISCUSSION/SUMMARY
[de-identified] : The patient is a 68 year old female 4 months status post right TKR and 7 weeks status post manipulation under anesthesia. The patient is finally starting to show some improvement in knee flexion.  In the office today she reached 100° quite easily.  I recommended continuing physical therapy.  She was given a new prescription for 10 tablets of oxycodone and we discussed that this will be the last narcotic prescription otherwise she will have to see pain management.  She is only using it for physical therapy.  I would like to see her back in about 4 weeks.

## 2019-03-11 NOTE — HISTORY OF PRESENT ILLNESS
[de-identified] : The patient is a 68 year old female 4 months status post right TKR and 7 weeks status post manipulation under anesthesia. She is ambulating and transferring well without assistive devices. She reports starting to reciprocate stairs. She reports continuing physical therapy with good improvement of knee ROM. She reports taking 2.5 mg of Oxycodone prior to physical therapy. She reports swelling after physical therapy. Overall, she is very happy with the results of the surgery thus far.

## 2019-03-11 NOTE — ADDENDUM
[FreeTextEntry1] : This note was authored by Last Collins working as a medical scribe for Dr. Kishore Sanz. The note was reviewed, edited, and revised by Dr. Kishore Sanz whom is in agreement with the exam findings, imaging findings, and treatment plan. 03/11/2019.

## 2019-03-11 NOTE — PHYSICAL EXAM
[de-identified] : The patient appears well nourished  and in no apparent distress.  The patient is alert and oriented to person, place, and time.   Affect and mood appear normal. The head is normocephalic and atraumatic.  The eyes reveal normal sclera and extra ocular muscles are intact. The tongue is midline with no apparent lesions.  Skin shows normal turgor with no evidence of eczema or psoriasis.  No respiratory distress noted.  Sensation grossly intact.\par   [de-identified] : Exam of the right knee shows a well healed incision except a persistent dry scab at the mid incision that is 2cm in length and 3 mm in width. ROM -3 to 100 degrees of flexion measured with a goniometer. 5/5 motor strength bilaterally distally. Sensation intact distally.

## 2019-04-11 ENCOUNTER — APPOINTMENT (OUTPATIENT)
Dept: ORTHOPEDIC SURGERY | Facility: CLINIC | Age: 69
End: 2019-04-11
Payer: MEDICARE

## 2019-04-11 VITALS
HEART RATE: 83 BPM | DIASTOLIC BLOOD PRESSURE: 76 MMHG | WEIGHT: 150 LBS | SYSTOLIC BLOOD PRESSURE: 114 MMHG | BODY MASS INDEX: 23.54 KG/M2 | HEIGHT: 67 IN

## 2019-04-11 PROCEDURE — 99213 OFFICE O/P EST LOW 20 MIN: CPT

## 2019-04-11 PROCEDURE — 73562 X-RAY EXAM OF KNEE 3: CPT

## 2019-04-11 RX ORDER — METOPROLOL TARTRATE 25 MG/1
25 TABLET, FILM COATED ORAL
Qty: 60 | Refills: 0 | Status: ACTIVE | COMMUNITY
Start: 2018-08-30

## 2019-04-11 RX ORDER — CEFADROXIL 500 MG/1
500 CAPSULE ORAL
Qty: 14 | Refills: 0 | Status: ACTIVE | COMMUNITY
Start: 2018-11-08

## 2019-04-11 RX ORDER — AMOXICILLIN 500 MG/1
500 CAPSULE ORAL
Qty: 24 | Refills: 0 | Status: ACTIVE | COMMUNITY
Start: 2019-02-04

## 2019-04-11 NOTE — ADDENDUM
[FreeTextEntry1] : This note was authored by Last Collins working as a medical scribe for Dr. Kishore Sanz. The note was reviewed, edited, and revised by Dr. Kishore Sanz whom is in agreement with the exam findings, imaging findings, and treatment plan. 04/11/2019.

## 2019-04-11 NOTE — REASON FOR VISIT
[Follow-Up Visit] : a follow-up visit for [Other: ____] : [unfilled] [FreeTextEntry2] : S/P Right TKR manipulation under anesthesia, DOS: 1/16/19.S/P Right Computer-assisted TKR DOS:11/07/18.  \par

## 2019-04-11 NOTE — PHYSICAL EXAM
[de-identified] : The patient appears well nourished  and in no apparent distress.  The patient is alert and oriented to person, place, and time.   Affect and mood appear normal. The head is normocephalic and atraumatic.  The eyes reveal normal sclera and extra ocular muscles are intact. The tongue is midline with no apparent lesions.  Skin shows normal turgor with no evidence of eczema or psoriasis.  No respiratory distress noted.  Sensation grossly intact.\par   [de-identified] : Exam of the right knee shows a well healed incision. No effusion, no instability, -4 to 105 degrees of flexion measured with a goniometer. 5/5 motor strength bilaterally distally. Sensation intact distally.  [de-identified] : Xray- 3 views of the right knee - well aligned and well fixed right knee replacement.

## 2019-04-11 NOTE — HISTORY OF PRESENT ILLNESS
[de-identified] : The patient is a 68 year old female 3 months s/p right knee manipulation under anesthesia. She is ambulating and transferring well without assistive devices. She reports attending physical therapy twice a week and reaching 112 degrees of flexion. She has returned to daily activities of living and reports gradual improvement.  She is considering left TKR over the summer but wants to continue to monitor the right side before deciding on that.

## 2019-04-11 NOTE — DISCUSSION/SUMMARY
[de-identified] : The patient is a 68 year old female 3 months s/p right knee manipulation under anesthesia for arthrofibrosis following TKR. She was given a prescription for physical therapy to continue. She feels that she is making progress in terms of function, range of motion, and strength.  She has improved her range of motion with flexion.  She is considering left knee replacement over the summer.  We will continue to monitor progress of the right side and discuss surgery for the left side when she is ready.

## 2019-05-22 NOTE — PHYSICAL THERAPY INITIAL EVALUATION ADULT - OCCUPATION
[facility]ist retired

## 2019-09-25 ENCOUNTER — OTHER (OUTPATIENT)
Age: 69
End: 2019-09-25

## 2019-11-08 ENCOUNTER — OTHER (OUTPATIENT)
Age: 69
End: 2019-11-08

## 2019-11-14 ENCOUNTER — APPOINTMENT (OUTPATIENT)
Dept: ORTHOPEDIC SURGERY | Facility: CLINIC | Age: 69
End: 2019-11-14
Payer: MEDICARE

## 2019-11-14 VITALS
SYSTOLIC BLOOD PRESSURE: 113 MMHG | WEIGHT: 150 LBS | HEIGHT: 67 IN | HEART RATE: 79 BPM | DIASTOLIC BLOOD PRESSURE: 78 MMHG | BODY MASS INDEX: 23.54 KG/M2

## 2019-11-14 DIAGNOSIS — Z96.651 PRESENCE OF RIGHT ARTIFICIAL KNEE JOINT: ICD-10-CM

## 2019-11-14 PROCEDURE — 73562 X-RAY EXAM OF KNEE 3: CPT

## 2019-11-14 PROCEDURE — 99213 OFFICE O/P EST LOW 20 MIN: CPT

## 2019-11-14 NOTE — ADDENDUM
[FreeTextEntry1] : This note was authored by Last Collins working as a medical scribe for Dr. Kishore Sanz. The note was reviewed, edited, and revised by Dr. Kishore Sanz whom is in agreement with the exam findings, imaging findings, and treatment plan. 11/14/2019.

## 2019-11-14 NOTE — HISTORY OF PRESENT ILLNESS
[de-identified] : The patient is a 69 year old female being seen for followup of her right TKR. She is now one year postop. She is overall pleased with the results of her surgery. She does note some stiffness but states it is slowly improving. She continues to exercise by walking and utilizing a stationary bicycle.

## 2019-11-14 NOTE — DISCUSSION/SUMMARY
[de-identified] : The patient is a 69 year old female 1 year s/p right TKR. She will continue home strengthening exercises. Overall, she is very happy with the results of the surgery. Dental prophylaxis was advised. Follow up one year post op for radiographic surveillance.

## 2019-11-14 NOTE — REASON FOR VISIT
[Follow-Up Visit] : a follow-up visit for [Other: ____] : [unfilled] [FreeTextEntry2] :  S/P Right TKR manipulation under anesthesia, DOS: 1/16/19.S/P Right Computer-assisted TKR DOS:11/07/18.

## 2019-11-14 NOTE — PHYSICAL EXAM
[de-identified] : Exam of the right knee shows a well healed incision. -5 to 107 degrees of flexion measured with a goniometer. 5/5 motor strength bilaterally distally. Sensation intact distally.  [de-identified] : The patient appears well nourished  and in no apparent distress.  The patient is alert and oriented to person, place, and time.   Affect and mood appear normal. The head is normocephalic and atraumatic.  The eyes reveal normal sclera and extra ocular muscles are intact. The tongue is midline with no apparent lesions.  Skin shows normal turgor with no evidence of eczema or psoriasis.  No respiratory distress noted.  Sensation grossly intact.\par   [de-identified] : Xray- 3 views of the right knee - well aligned and well fixed right knee replacement.

## 2021-02-01 ENCOUNTER — APPOINTMENT (OUTPATIENT)
Dept: GASTROENTEROLOGY | Facility: CLINIC | Age: 71
End: 2021-02-01
Payer: MEDICARE

## 2021-02-09 ENCOUNTER — APPOINTMENT (OUTPATIENT)
Dept: GASTROENTEROLOGY | Facility: CLINIC | Age: 71
End: 2021-02-09
Payer: MEDICARE

## 2021-02-09 VITALS
HEIGHT: 67 IN | DIASTOLIC BLOOD PRESSURE: 60 MMHG | RESPIRATION RATE: 15 BRPM | OXYGEN SATURATION: 98 % | SYSTOLIC BLOOD PRESSURE: 100 MMHG | HEART RATE: 70 BPM | TEMPERATURE: 97.1 F | WEIGHT: 162 LBS | BODY MASS INDEX: 25.43 KG/M2

## 2021-02-09 PROCEDURE — 99204 OFFICE O/P NEW MOD 45 MIN: CPT

## 2021-02-09 PROCEDURE — 99072 ADDL SUPL MATRL&STAF TM PHE: CPT

## 2021-02-09 PROCEDURE — 82272 OCCULT BLD FECES 1-3 TESTS: CPT

## 2021-02-09 NOTE — HISTORY OF PRESENT ILLNESS
[de-identified] : The patient is hereHistory of ventricular arrhythmia, atrial fibrillation, asthma.  In the past she was seen for guaiac positive stools.  Colonoscopy in 2015 showed a cecal polyp which was a tubular adenoma.  EGD was normal.\par \par Patient is here with mild symptoms of increased belching and flatulence.  Symptoms have been for a few months.  She does admit to increasing the fiber in her diet.  Tried simethicone with minimal benefit.  She seems to correlate this with increasing her Lopressor dosage.  Symptoms are mild.  Symptoms last for about 15 minutes.  No constipation diarrhea black stools or bloody stools.

## 2021-02-09 NOTE — PHYSICAL EXAM
[General Appearance - Alert] : alert [General Appearance - In No Acute Distress] : in no acute distress [General Appearance - Well Nourished] : well nourished [Sclera] : the sclera and conjunctiva were normal [Outer Ear] : the ears and nose were normal in appearance [Neck Appearance] : the appearance of the neck was normal [Neck Cervical Mass (___cm)] : no neck mass was observed [] : no respiratory distress [Respiration, Rhythm And Depth] : normal respiratory rhythm and effort [Exaggerated Use Of Accessory Muscles For Inspiration] : no accessory muscle use [Auscultation Breath Sounds / Voice Sounds] : lungs were clear to auscultation bilaterally [Apical Impulse] : the apical impulse was normal [Heart Rate And Rhythm] : heart rate was normal and rhythm regular [Heart Sounds] : normal S1 and S2 [Bowel Sounds] : normal bowel sounds [Abdomen Soft] : soft [Abdomen Tenderness] : non-tender [Normal Sphincter Tone] : normal sphincter tone [No Rectal Mass] : no rectal mass [Skin Color & Pigmentation] : normal skin color and pigmentation [Femoral Lymph Nodes Enlarged Bilaterally] : femoral [Oriented To Time, Place, And Person] : oriented to person, place, and time [Impaired Insight] : insight and judgment were intact [Affect] : the affect was normal [Internal Hemorrhoid] : no internal hemorrhoids [External Hemorrhoid] : no external hemorrhoids [Occult Blood Positive] : stool was negative for occult blood

## 2021-02-09 NOTE — ASSESSMENT
[FreeTextEntry1] : A/P\par Assessment and plan\par 1 patient with a history of flatulence and belching.  Most likely due to the increase in fiber in her diet.  If symptoms are worse then we may consider doing blood work for celiac, stool study for H. pylori, lactose tolerance test.\par 2 patient with a history of colon polyps.  Colonoscopy with MiraLAX prep..\par \par  I discussed the risks and benefits of colonoscopy and patient was given opportunity to ask questions. Colonoscopy to r/o colon cancer, polyps, AVM's. Patient is medically optimized for the procedure\par \par \par \par  She is seeing her cardiologist Dr. Padilla in 4 days.  She will get cardiac clearance at that time and then colonoscopy will be scheduled.\par \par Note–son has malignant hypertension thermia.  The patient has had no issues with anesthesia in the past.

## 2021-02-20 ENCOUNTER — TRANSCRIPTION ENCOUNTER (OUTPATIENT)
Age: 71
End: 2021-02-20

## 2021-02-20 DIAGNOSIS — Z01.818 ENCOUNTER FOR OTHER PREPROCEDURAL EXAMINATION: ICD-10-CM

## 2021-02-21 ENCOUNTER — APPOINTMENT (OUTPATIENT)
Dept: DISASTER EMERGENCY | Facility: CLINIC | Age: 71
End: 2021-02-21

## 2021-02-21 LAB — SARS-COV-2 N GENE NPH QL NAA+PROBE: NOT DETECTED

## 2021-02-25 ENCOUNTER — APPOINTMENT (OUTPATIENT)
Dept: GASTROENTEROLOGY | Facility: GI CENTER | Age: 71
End: 2021-02-25
Payer: MEDICARE

## 2021-02-25 ENCOUNTER — OUTPATIENT (OUTPATIENT)
Dept: OUTPATIENT SERVICES | Facility: HOSPITAL | Age: 71
LOS: 1 days | End: 2021-02-25
Payer: MEDICARE

## 2021-02-25 DIAGNOSIS — Z98.49 CATARACT EXTRACTION STATUS, UNSPECIFIED EYE: Chronic | ICD-10-CM

## 2021-02-25 DIAGNOSIS — Z86.010 PERSONAL HISTORY OF COLONIC POLYPS: ICD-10-CM

## 2021-02-25 DIAGNOSIS — Z96.651 PRESENCE OF RIGHT ARTIFICIAL KNEE JOINT: Chronic | ICD-10-CM

## 2021-02-25 DIAGNOSIS — Z98.51 TUBAL LIGATION STATUS: Chronic | ICD-10-CM

## 2021-02-25 DIAGNOSIS — Z98.890 OTHER SPECIFIED POSTPROCEDURAL STATES: Chronic | ICD-10-CM

## 2021-02-25 PROCEDURE — G0105: CPT

## 2021-02-25 PROCEDURE — 45378 DIAGNOSTIC COLONOSCOPY: CPT

## 2021-02-25 NOTE — PROCEDURE
[Hx of Colon Polyps] : history of colon polyps [Procedure Explained] : The procedure was explained [Allergies Reviewed] : allergies reviewed. [Risks] : Risks [Benefits] : benefits [Alternatives] : alternatives [Bleeding] : bleeding risk [Infection] : risk of infection [Consent Obtained] : written consent was obtained prior to the procedure and is detailed in the patient's record [Patient] : the patient [Bowel Prep Kit] : the patient took the appropriate bowel preparation kit as directed [Approved Diet Followed] : the patient avoided solid foods and adhered to the approved diet list for 24 hours prior to the procedure [Automated Blood Pressure Cuff] : automated blood pressure cuff [Cardiac Monitor] : cardiac monitor [Pulse Oximeter] : pulse oximeter [2] : 2 [Prep Qualtiy: ___] : Prep Quality:  [unfilled] [Withdrawal Time: ___] : Withdrawal Time:  [unfilled] [Performed By: ___] : Performed by:  EMILY [Left Lateral Decubitus] : The patient was positioned in the left lateral decubitus position [Abnormal Rectum] : a normal rectum [External Hemorrhoids] : no external hemorrhoids [Cecum (Landmarks/Transillum)] : and guided to the cecum which was identified by the anatomic landmarks of the appendiceal orifice and ileocecal valve and by transillumination in the right lower quadrant [Insufflated] : insufflated [Single Pass Needed] : after a single pass [Retroflex View] : a retroflex view of the rectum was performed [Patient Rotated Into Alternating Positions] : the patient was not rotated [Normal] : Normal [Tolerated Well] : the patient tolerated the procedure well [Vital Signs Stable] : the vital signs were stable [No Complications] : There were no complications

## 2021-02-25 NOTE — REASON FOR VISIT
[FreeTextEntry1] : history of colon polyps [Colonoscopy] : a colonoscopy [FreeTextEntry2] : hx of colon polyps

## 2021-02-25 NOTE — PHYSICAL EXAM
[General Appearance - Alert] : alert [General Appearance - In No Acute Distress] : in no acute distress [General Appearance - Well Nourished] : well nourished [General Appearance - Well Developed] : well developed [Sclera] : the sclera and conjunctiva were normal [Outer Ear] : the ears and nose were normal in appearance [Hearing Threshold Finger Rub Not Highland] : hearing was normal [Both Tympanic Membranes Were Examined] : both tympanic membranes were normal [] : no respiratory distress [Respiration, Rhythm And Depth] : normal respiratory rhythm and effort [Exaggerated Use Of Accessory Muscles For Inspiration] : no accessory muscle use [Auscultation Breath Sounds / Voice Sounds] : lungs were clear to auscultation bilaterally [Apical Impulse] : the apical impulse was normal [Heart Rate And Rhythm] : heart rate was normal and rhythm regular [Heart Sounds] : normal S1 and S2 [Bowel Sounds] : normal bowel sounds [Abdomen Soft] : soft [Abdomen Tenderness] : non-tender [Oriented To Time, Place, And Person] : oriented to person, place, and time [Impaired Insight] : insight and judgment were intact [Affect] : the affect was normal

## 2021-02-25 NOTE — ASSESSMENT
[FreeTextEntry1] : A/P\par Normal colonoscopy\par hx of colon polyps\par colonoscopy in 5 years\par

## 2021-05-26 NOTE — PATIENT PROFILE ADULT - [id number]_GEN_A_NUR
Patient is a 67y male presenting to the ED ambulatory from home with c/o abnormal lab results. Patient A&Ox4. Patient reports having routine blood work performed by PMD, received a call today stating the results showed elevated BUN and creatinine and was instructed to come to the hospital. Reports noticing he becomes short of breath with ambulation during the past 1-2 weeks which is abnormal for him and states he was feeling "under the weather" last week which included feeling fatigued but feels better this week. Denies any chest pain, fever/chills, N/V/D, abdominal pain, back pain, burning upon urination or difficulty urinating. Respirations are spontaneous, even, and non-labored. Abdomen soft, non-distended and non-tender upon palpation.
patient

## 2021-07-26 ENCOUNTER — INPATIENT (INPATIENT)
Facility: HOSPITAL | Age: 71
LOS: 3 days | Discharge: HOME | DRG: 286 | End: 2021-07-30
Attending: HOSPITALIST | Admitting: INTERNAL MEDICINE
Payer: MEDICARE

## 2021-07-26 VITALS
HEART RATE: 144 BPM | RESPIRATION RATE: 20 BRPM | TEMPERATURE: 99 F | HEIGHT: 67 IN | DIASTOLIC BLOOD PRESSURE: 82 MMHG | SYSTOLIC BLOOD PRESSURE: 115 MMHG | OXYGEN SATURATION: 96 % | WEIGHT: 166.45 LBS

## 2021-07-26 DIAGNOSIS — Z98.51 TUBAL LIGATION STATUS: Chronic | ICD-10-CM

## 2021-07-26 DIAGNOSIS — Z96.651 PRESENCE OF RIGHT ARTIFICIAL KNEE JOINT: Chronic | ICD-10-CM

## 2021-07-26 DIAGNOSIS — Z98.49 CATARACT EXTRACTION STATUS, UNSPECIFIED EYE: Chronic | ICD-10-CM

## 2021-07-26 DIAGNOSIS — Z98.890 OTHER SPECIFIED POSTPROCEDURAL STATES: Chronic | ICD-10-CM

## 2021-07-26 DIAGNOSIS — I50.9 HEART FAILURE, UNSPECIFIED: ICD-10-CM

## 2021-07-26 LAB
ALBUMIN SERPL ELPH-MCNC: 4.2 G/DL — SIGNIFICANT CHANGE UP (ref 3.3–5.2)
ALP SERPL-CCNC: 142 U/L — HIGH (ref 40–120)
ALT FLD-CCNC: 201 U/L — HIGH
ANION GAP SERPL CALC-SCNC: 15 MMOL/L — SIGNIFICANT CHANGE UP (ref 5–17)
AST SERPL-CCNC: 137 U/L — HIGH
BASOPHILS # BLD AUTO: 0.06 K/UL — SIGNIFICANT CHANGE UP (ref 0–0.2)
BASOPHILS NFR BLD AUTO: 0.8 % — SIGNIFICANT CHANGE UP (ref 0–2)
BILIRUB SERPL-MCNC: 0.5 MG/DL — SIGNIFICANT CHANGE UP (ref 0.4–2)
BUN SERPL-MCNC: 23.7 MG/DL — HIGH (ref 8–20)
CALCIUM SERPL-MCNC: 9.2 MG/DL — SIGNIFICANT CHANGE UP (ref 8.6–10.2)
CHLORIDE SERPL-SCNC: 107 MMOL/L — SIGNIFICANT CHANGE UP (ref 98–107)
CO2 SERPL-SCNC: 20 MMOL/L — LOW (ref 22–29)
CREAT SERPL-MCNC: 0.84 MG/DL — SIGNIFICANT CHANGE UP (ref 0.5–1.3)
EOSINOPHIL # BLD AUTO: 0.16 K/UL — SIGNIFICANT CHANGE UP (ref 0–0.5)
EOSINOPHIL NFR BLD AUTO: 2.1 % — SIGNIFICANT CHANGE UP (ref 0–6)
GLUCOSE SERPL-MCNC: 135 MG/DL — HIGH (ref 70–99)
HAV IGM SER-ACNC: SIGNIFICANT CHANGE UP
HBV CORE IGM SER-ACNC: SIGNIFICANT CHANGE UP
HBV SURFACE AG SER-ACNC: SIGNIFICANT CHANGE UP
HCT VFR BLD CALC: 46.2 % — HIGH (ref 34.5–45)
HCV AB S/CO SERPL IA: 0.08 S/CO — SIGNIFICANT CHANGE UP (ref 0–0.99)
HCV AB SERPL-IMP: SIGNIFICANT CHANGE UP
HGB BLD-MCNC: 15 G/DL — SIGNIFICANT CHANGE UP (ref 11.5–15.5)
IMM GRANULOCYTES NFR BLD AUTO: 0.3 % — SIGNIFICANT CHANGE UP (ref 0–1.5)
LYMPHOCYTES # BLD AUTO: 0.74 K/UL — LOW (ref 1–3.3)
LYMPHOCYTES # BLD AUTO: 9.7 % — LOW (ref 13–44)
MAGNESIUM SERPL-MCNC: 2 MG/DL — SIGNIFICANT CHANGE UP (ref 1.6–2.6)
MCHC RBC-ENTMCNC: 32 PG — SIGNIFICANT CHANGE UP (ref 27–34)
MCHC RBC-ENTMCNC: 32.5 GM/DL — SIGNIFICANT CHANGE UP (ref 32–36)
MCV RBC AUTO: 98.5 FL — SIGNIFICANT CHANGE UP (ref 80–100)
MONOCYTES # BLD AUTO: 0.39 K/UL — SIGNIFICANT CHANGE UP (ref 0–0.9)
MONOCYTES NFR BLD AUTO: 5.1 % — SIGNIFICANT CHANGE UP (ref 2–14)
NEUTROPHILS # BLD AUTO: 6.22 K/UL — SIGNIFICANT CHANGE UP (ref 1.8–7.4)
NEUTROPHILS NFR BLD AUTO: 82 % — HIGH (ref 43–77)
NT-PROBNP SERPL-SCNC: 5303 PG/ML — HIGH (ref 0–300)
PLATELET # BLD AUTO: 279 K/UL — SIGNIFICANT CHANGE UP (ref 150–400)
POTASSIUM SERPL-MCNC: 4.9 MMOL/L — SIGNIFICANT CHANGE UP (ref 3.5–5.3)
POTASSIUM SERPL-SCNC: 4.9 MMOL/L — SIGNIFICANT CHANGE UP (ref 3.5–5.3)
PROT SERPL-MCNC: 6.7 G/DL — SIGNIFICANT CHANGE UP (ref 6.6–8.7)
RBC # BLD: 4.69 M/UL — SIGNIFICANT CHANGE UP (ref 3.8–5.2)
RBC # FLD: 14 % — SIGNIFICANT CHANGE UP (ref 10.3–14.5)
SARS-COV-2 RNA SPEC QL NAA+PROBE: SIGNIFICANT CHANGE UP
SODIUM SERPL-SCNC: 141 MMOL/L — SIGNIFICANT CHANGE UP (ref 135–145)
TROPONIN T SERPL-MCNC: <0.01 NG/ML — SIGNIFICANT CHANGE UP (ref 0–0.06)
WBC # BLD: 7.59 K/UL — SIGNIFICANT CHANGE UP (ref 3.8–10.5)
WBC # FLD AUTO: 7.59 K/UL — SIGNIFICANT CHANGE UP (ref 3.8–10.5)

## 2021-07-26 PROCEDURE — 71046 X-RAY EXAM CHEST 2 VIEWS: CPT | Mod: 26

## 2021-07-26 PROCEDURE — 93306 TTE W/DOPPLER COMPLETE: CPT | Mod: 26

## 2021-07-26 PROCEDURE — 99285 EMERGENCY DEPT VISIT HI MDM: CPT

## 2021-07-26 PROCEDURE — 99223 1ST HOSP IP/OBS HIGH 75: CPT

## 2021-07-26 PROCEDURE — 93010 ELECTROCARDIOGRAM REPORT: CPT

## 2021-07-26 RX ORDER — SODIUM CHLORIDE 9 MG/ML
1000 INJECTION, SOLUTION INTRAVENOUS ONCE
Refills: 0 | Status: COMPLETED | OUTPATIENT
Start: 2021-07-26 | End: 2021-07-26

## 2021-07-26 RX ORDER — MILK THISTLE 150 MG
200 CAPSULE ORAL
Qty: 0 | Refills: 0 | DISCHARGE

## 2021-07-26 RX ORDER — PROGESTERONE 200 MG/1
0 CAPSULE, LIQUID FILLED ORAL
Qty: 0 | Refills: 0 | DISCHARGE

## 2021-07-26 RX ORDER — CHOLECALCIFEROL (VITAMIN D3) 125 MCG
1 CAPSULE ORAL
Qty: 0 | Refills: 0 | DISCHARGE

## 2021-07-26 RX ORDER — ASCORBIC ACID 60 MG
1 TABLET,CHEWABLE ORAL
Qty: 0 | Refills: 0 | DISCHARGE

## 2021-07-26 RX ORDER — METOPROLOL TARTRATE 50 MG
50 TABLET ORAL EVERY 8 HOURS
Refills: 0 | Status: DISCONTINUED | OUTPATIENT
Start: 2021-07-26 | End: 2021-07-27

## 2021-07-26 RX ORDER — FUROSEMIDE 40 MG
20 TABLET ORAL ONCE
Refills: 0 | Status: COMPLETED | OUTPATIENT
Start: 2021-07-26 | End: 2021-07-26

## 2021-07-26 RX ORDER — METOPROLOL TARTRATE 50 MG
5 TABLET ORAL ONCE
Refills: 0 | Status: COMPLETED | OUTPATIENT
Start: 2021-07-26 | End: 2021-07-26

## 2021-07-26 RX ORDER — ALPRAZOLAM 0.25 MG
1 TABLET ORAL
Qty: 0 | Refills: 0 | DISCHARGE

## 2021-07-26 RX ORDER — AMIODARONE HYDROCHLORIDE 400 MG/1
150 TABLET ORAL ONCE
Refills: 0 | Status: COMPLETED | OUTPATIENT
Start: 2021-07-26 | End: 2021-07-26

## 2021-07-26 RX ORDER — BUDESONIDE AND FORMOTEROL FUMARATE DIHYDRATE 160; 4.5 UG/1; UG/1
2 AEROSOL RESPIRATORY (INHALATION)
Refills: 0 | Status: DISCONTINUED | OUTPATIENT
Start: 2021-07-26 | End: 2021-07-30

## 2021-07-26 RX ORDER — L.ACIDOPH/B.ANIMALIS/B.LONGUM 15B CELL
1 CAPSULE ORAL
Qty: 0 | Refills: 0 | DISCHARGE

## 2021-07-26 RX ORDER — LANOLIN ALCOHOL/MO/W.PET/CERES
3 CREAM (GRAM) TOPICAL ONCE
Refills: 0 | Status: COMPLETED | OUTPATIENT
Start: 2021-07-26 | End: 2021-07-26

## 2021-07-26 RX ORDER — LANOLIN ALCOHOL/MO/W.PET/CERES
1 CREAM (GRAM) TOPICAL
Qty: 0 | Refills: 0 | DISCHARGE

## 2021-07-26 RX ORDER — ZINC SULFATE TAB 220 MG (50 MG ZINC EQUIVALENT) 220 (50 ZN) MG
1 TAB ORAL
Qty: 0 | Refills: 0 | DISCHARGE

## 2021-07-26 RX ORDER — GLUCOSAMINE/CHONDROITIN/C/MANG 500-400 MG
3 CAPSULE ORAL
Qty: 0 | Refills: 0 | DISCHARGE

## 2021-07-26 RX ORDER — METOPROLOL TARTRATE 50 MG
12.5 TABLET ORAL
Qty: 0 | Refills: 0 | DISCHARGE

## 2021-07-26 RX ORDER — APIXABAN 2.5 MG/1
5 TABLET, FILM COATED ORAL EVERY 12 HOURS
Refills: 0 | Status: DISCONTINUED | OUTPATIENT
Start: 2021-07-26 | End: 2021-07-27

## 2021-07-26 RX ORDER — AMIODARONE HYDROCHLORIDE 400 MG/1
1 TABLET ORAL
Qty: 900 | Refills: 0 | Status: DISCONTINUED | OUTPATIENT
Start: 2021-07-26 | End: 2021-07-28

## 2021-07-26 RX ORDER — AMIODARONE HYDROCHLORIDE 400 MG/1
1 TABLET ORAL
Qty: 900 | Refills: 0 | Status: DISCONTINUED | OUTPATIENT
Start: 2021-07-26 | End: 2021-07-26

## 2021-07-26 RX ORDER — DIGOXIN 250 MCG
0.5 TABLET ORAL ONCE
Refills: 0 | Status: DISCONTINUED | OUTPATIENT
Start: 2021-07-26 | End: 2021-07-26

## 2021-07-26 RX ORDER — FEXOFENADINE HCL AND PSEUDOEPHEDRINE HCI 60; 120 MG/1; MG/1
0.5 TABLET, EXTENDED RELEASE ORAL
Qty: 0 | Refills: 0 | DISCHARGE

## 2021-07-26 RX ORDER — UBIDECARENONE 100 MG
0 CAPSULE ORAL
Qty: 0 | Refills: 0 | DISCHARGE

## 2021-07-26 RX ORDER — ALENDRONATE SODIUM 70 MG/1
1 TABLET ORAL
Qty: 0 | Refills: 0 | DISCHARGE

## 2021-07-26 RX ORDER — ALBUTEROL 90 UG/1
2 AEROSOL, METERED ORAL EVERY 6 HOURS
Refills: 0 | Status: DISCONTINUED | OUTPATIENT
Start: 2021-07-26 | End: 2021-07-27

## 2021-07-26 RX ORDER — AMIODARONE HYDROCHLORIDE 400 MG/1
0.5 TABLET ORAL
Qty: 900 | Refills: 0 | Status: DISCONTINUED | OUTPATIENT
Start: 2021-07-26 | End: 2021-07-28

## 2021-07-26 RX ORDER — APIXABAN 2.5 MG/1
1 TABLET, FILM COATED ORAL
Qty: 0 | Refills: 0 | DISCHARGE

## 2021-07-26 RX ADMIN — SODIUM CHLORIDE 1000 MILLILITER(S): 9 INJECTION, SOLUTION INTRAVENOUS at 07:34

## 2021-07-26 RX ADMIN — Medication 50 MILLIGRAM(S): at 11:56

## 2021-07-26 RX ADMIN — BUDESONIDE AND FORMOTEROL FUMARATE DIHYDRATE 2 PUFF(S): 160; 4.5 AEROSOL RESPIRATORY (INHALATION) at 21:25

## 2021-07-26 RX ADMIN — Medication 20 MILLIGRAM(S): at 08:43

## 2021-07-26 RX ADMIN — Medication 3 MILLIGRAM(S): at 21:19

## 2021-07-26 RX ADMIN — APIXABAN 5 MILLIGRAM(S): 2.5 TABLET, FILM COATED ORAL at 18:10

## 2021-07-26 RX ADMIN — Medication 5 MILLIGRAM(S): at 09:45

## 2021-07-26 RX ADMIN — Medication 50 MILLIGRAM(S): at 21:20

## 2021-07-26 RX ADMIN — Medication 5 MILLIGRAM(S): at 09:09

## 2021-07-26 RX ADMIN — AMIODARONE HYDROCHLORIDE 618 MILLIGRAM(S): 400 TABLET ORAL at 11:00

## 2021-07-26 RX ADMIN — AMIODARONE HYDROCHLORIDE 618 MILLIGRAM(S): 400 TABLET ORAL at 14:22

## 2021-07-26 RX ADMIN — ALBUTEROL 2 PUFF(S): 90 AEROSOL, METERED ORAL at 23:43

## 2021-07-26 RX ADMIN — AMIODARONE HYDROCHLORIDE 33.3 MG/MIN: 400 TABLET ORAL at 14:53

## 2021-07-26 RX ADMIN — AMIODARONE HYDROCHLORIDE 16.7 MG/MIN: 400 TABLET ORAL at 21:19

## 2021-07-26 RX ADMIN — Medication 5 MILLIGRAM(S): at 08:43

## 2021-07-26 NOTE — ED PROVIDER NOTE - CLINICAL SUMMARY MEDICAL DECISION MAKING FREE TEXT BOX
70-year-old female with PMH atrial fibrillation, asthma and recent GI infection presenting with dyspnea, palpitations and tachycardic on the monitor is concerning for hypovolemia, electrolyte disorder, ACS, pneumonia, thyroid storm, CHF. Will order labs, repeat EKG, CXR, continuous cardiac monitor and pulse oximetry. 70-year-old female with PMH atrial fibrillation, asthma and recent GI infection presenting with dyspnea, palpitations and tachycardic on the monitor is concerning for hypovolemia, electrolyte disorder, ACS, pneumonia, thyroid storm, CHF. Will order labs, repeat EKG when rate improved, CXR, continuous cardiac monitor and pulse oximetry. if no improvement in HR with fluids will give cardizem, avoid beta blockers due to asthma 70-year-old female with PMH atrial fibrillation, asthma and recent GI infection presenting with dyspnea, palpitations and tachycardic on the monitor is concerning for hypovolemia, electrolyte disorder, ACS, pneumonia, thyroid storm, CHF. Will order labs, repeat EKG when rate improved, CXR, continuous cardiac monitor and pulse oximetry. if no improvement in HR with fluids will give cardizem, avoid beta blockers due to asthma. 70-year-old female with PMH atrial fibrillation, asthma and recent GI infection presenting with dyspnea, palpitations and tachycardic on the monitor is concerning for hypovolemia, electrolyte disorder, ACS, pneumonia, thyroid storm, CHF. Will order labs, repeat EKG when rate improved, CXR, continuous cardiac monitor and pulse oximetry. if no improvement in HR with fluids will give rate control agent

## 2021-07-26 NOTE — H&P ADULT - ASSESSMENT
69 yo F w/ hx asthma, afib on eliquis presents to ER for worsening palpitations and shortness of breath with associated chest pain.  Notes recent gastroenteritis that has since resolved but believes it provoked the palpitations.   hx flecainide use in past, non tolerable.         AFlutter w/ RVR vs SVT     ? underlying Afib     ER d/w Dr Mc, initiate amiodarone     c/w eliquis     increase lopressor to 50mg TID     echo     trend trops.    suspected acute systolic HF:     short course of IV lasix    transaminitis: possibly due to hepatic congestion    check hepatitis panel    trend     asthma: not in acute exacerbation    symbicort (advair not on formulary)      69 yo F w/ hx asthma, afib on eliquis presents to ER for worsening palpitations and shortness of breath with associated chest pain.  Notes recent gastroenteritis that has since resolved but believes it provoked the palpitations.   hx flecainide use in past, non tolerable.         AFlutter w/ RVR vs SVT     ? underlying Afib     ER d/w Dr Mc, initiate amiodarone     c/w eliquis     increase lopressor to 50mg TID     echo     trend trops.    suspected acute systolic HF:     IV lasix 40 mg total.  check need for lasix daily    transaminitis: possibly due to hepatic congestion    check hepatitis panel    trend     asthma: not in acute exacerbation    symbicort (advair not on formulary)

## 2021-07-26 NOTE — ED ADULT TRIAGE NOTE - CHIEF COMPLAINT QUOTE
patient states that she had an abdominal discomfort (virus) from a vacation that has resolved, thursday started having SOB, and palpitations,  patient has HX of Afib and Asthma, she increased her medication due to palpitations unsure if SOB is related

## 2021-07-26 NOTE — H&P ADULT - NSICDXFAMILYHX_GEN_ALL_CORE_FT
FAMILY HISTORY:  Father  Still living? No  Family history of coronary artery disease, Age at diagnosis: 51-60    Child  Still living? Yes, Estimated age: 31-40  Family history of malignant hyperthermia, Age at diagnosis: Age Unknown

## 2021-07-26 NOTE — ED ADULT NURSE NOTE - ED STAT RN HANDOFF DETAILS
report given to BAILEY Argueta. pt transported to 6229 door on cardiac monitor with amiodarone infusing per mD orders. safely transferrred to hospital bed with receiving RN bedside with tele box

## 2021-07-26 NOTE — H&P ADULT - NSICDXPASTSURGICALHX_GEN_ALL_CORE_FT
PAST SURGICAL HISTORY:  History of total right knee replacement 11/2018    S/P cataract extraction and insertion of intraocular lens     S/P knee surgery R    S/P tubal ligation

## 2021-07-26 NOTE — ED PROVIDER NOTE - CARE PLAN
Principal Discharge DX:	Acute CHF  Secondary Diagnosis:	Atrial fibrillation with rapid ventricular response

## 2021-07-26 NOTE — ED PROVIDER NOTE - PHYSICAL EXAMINATION
General: Well appearing female in no acute distress. Alert and cooperative.   Head: Normocephalic, atraumatic.  Eyes: PERRLA. No conjunctival injection. No scleral icterus. EOMI  ENMT: Atraumatic external nose and ears.   Neck:. Soft and supple. Full ROM without pain.   Cardiac: tachycardic and regular rhythm. No murmurs, rubs, gallops. Peripheral pulses 2+ and symmetric in all extremities. No LE edema.  Resp: Unlabored respiratory effort. Lungs CTAB. Speaking in full sentences. No wheezes, rales or rhonchi.  Abd: Soft, non-tender, non-distended. No guarding or rebound. No scars, masses, or lesions.  Skin: Warm and dry. No rashes, abrasions, or lacerations.  Neuro: AO x 3. Moves all extremities symmetrically. Motor strength and sensation grossly intact.

## 2021-07-26 NOTE — ED PROVIDER NOTE - OBJECTIVE STATEMENT
70-year-old female presents with shortness of breath and palpitations. PMH of atrial fibrillation and asthma. Patient states that Wednesday she began experiencing cramping and diarrhea and Thursday began to experience palpitations. She noted that her heart rate was elevated and increased her Lopressor. Also has been using her Albuterol more frequently since onset of symptoms. She reports dizziness and generalized weakness. Denies headache, cough, wheeze, chest pain, dysuria, blood in stool, or lower extremity swelling. Compliant on Eliquis. 70-year-old female presents with shortness of breath and palpitations. PMH of atrial fibrillation and asthma. Patient states that Wednesday she began experiencing cramping and diarrhea and Thursday began to experience palpitations. Sx worse with exertion and lying flat. no chest pain. no leg swelling. She noted that her heart rate was elevated and increased her Lopressor. Also has been using her Albuterol more frequently since onset of symptoms. She reports dizziness and generalized weakness. Denies headache, cough, wheeze, chest pain, dysuria, blood in stool, or lower extremity swelling. Compliant on Eliquis.

## 2021-07-26 NOTE — ED PROCEDURE NOTE - PROCEDURE ADDITIONAL DETAILS
b/l B lines mid lung fields, b/l pleural effusion Lt > Rt, decreased EF with abnormality of septal wall. no dilation RV. no pericardial effusion    Follow up TTE

## 2021-07-26 NOTE — CONSULT NOTE ADULT - SUBJECTIVE AND OBJECTIVE BOX
[facility]                                          [address], [address], [place] [address]                                               PHONE: [phone]    FAX: [phone] [address], [address], [place] [address]                                       PHONE: [phone]    FAX: [phone]  *******************************************************************************    Reason for Consult: rapid atrial fibrillation / congestive heart failure    Requested by : Dr Choi    HPI:  MELO BERRIOS is a 70y Female admited with a five day history of intermittent palpitations (AFIB) and progressive shortness of breath. History of paroxysmal arial fibrillation as well as a history of WCT/ Intolerant of flecainide, typically maintained on metoprolol. Anticoagulated No HL, DM. +FH of CAD. former smoker. Over the past few days she has had multiple episodes of rapid irregular HR with worsening shortness of breath, and came to [facility] ER. In the ER she was noted to be in AF/RVR and in acute congestive heart failure. She received IV lasix, oxygen with improved shortness of breath. Minimal HR response to three doses of metoprolol, given amiodarone bolus x 1 with slowing of HR to 90's.     PAST MEDICAL & SURGICAL HISTORY:  Asthma    Hepatitis B    Palpitations  SVT, short runs VT on Holter    Atrial fibrillation    S/P knee surgery  R    S/P tubal ligation    S/P cataract extraction and insertion of intraocular lens    History of total right knee replacement  11/2018        carisoprodol (Hives)      MEDICATIONS  (STANDING):  aMIOdarone Infusion 1 mG/Min (33.3 mL/Hr) IV Continuous <Continuous>  aMIOdarone Infusion 1 mG/Min (33.3 mL/Hr) IV Continuous <Continuous>  apixaban 5 milliGRAM(s) Oral every 12 hours  budesonide 160 MICROgram(s)/formoterol 4.5 MICROgram(s) Inhaler 2 Puff(s) Inhalation two times a day  furosemide   Injectable 20 milliGRAM(s) IV Push once  metoprolol tartrate 50 milliGRAM(s) Oral every 8 hours    MEDICATIONS  (PRN):  ALBUTerol    90 MICROgram(s) HFA Inhaler 2 Puff(s) Inhalation every 6 hours PRN Shortness of Breath and/or Wheezing      Social History: no active tobacco / EtOH / IVDA    Family History: Family history of malignant hyperthermia (Child)    Family history of coronary artery disease (Father)        ROS: As noted above, otherwise unremarkable.    Vital Signs Last 24 Hrs  T(C): 37 (26 Jul 2021 06:42), Max: 37 (26 Jul 2021 06:42)  T(F): 98.6 (26 Jul 2021 06:42), Max: 98.6 (26 Jul 2021 06:42)  HR: 98 (26 Jul 2021 11:30) (98 - 150)  BP: 100/73 (26 Jul 2021 11:30) (100/73 - 136/81)  BP(mean): --  RR: 20 (26 Jul 2021 06:42) (20 - 20)  SpO2: 96% (26 Jul 2021 06:42) (96% - 96%)    I&O's Detail    I&O's Summary          PHYSICAL EXAM:  General: Appears well developed, well nourished, no acute distress  HEENT: Head: normocephalic, atraumatic  Eyes: Pupils equal and reactive  Neck: Supple, no carotid bruit, no JVD, no HJR  CARDIOVASCULAR: Rapid irregular S1 and S2, no murmur, rub, or gallop  LUNGS: Clear to auscultation bilaterally, no rales, rhonchi or wheeze  ABDOMEN: Soft, nontender, non-distended, positive bowel sounds, no mass or bruit  EXTREMITIES: No edema, distal pulses WNL  SKIN: Warm and dry with normal turgor  NEURO: Alert & oriented x 3, grossly intact  PSYCH: normal mood and affect    LABS:                        15.0   7.59  )-----------( 279      ( 26 Jul 2021 07:51 )             46.2     07-26    141  |  107  |  23.7<H>  ----------------------------<  135<H>  4.9   |  20.0<L>  |  0.84    Ca    9.2      26 Jul 2021 07:51  Mg     2.0     07-26    TPro  6.7  /  Alb  4.2  /  TBili  0.5  /  DBili  x   /  AST  137<H>  /  ALT  201<H>  /  AlkPhos  142<H>  07-26    CARDIAC MARKERS ( 26 Jul 2021 07:51 )  x     / <0.01 ng/mL / x     / x     / x        BNP 5300    ECG: AF/RVR/PRWP/NSSTT abn    ECHO:2/4/21: normal LV function LVE 65 %, tr MR< mild TR    STRESS TEST: myoview stress test 12/2019 normal perfusion LVEF 79(        Assessment and Plan:  In summary, MELO BERRIOS is a 70y Female with past medical history significant for paroxysmal atrial fibrillation/HTN who is admitted with progressive shortness of breath for five days in the setting of persistent rapid atrial fibrillation.  History of HTN, former smoker. Symptoms much improved after lasix IV x 1. HR better controlled after initial amiodarone IV bolus. She is chronically anticoagulated with eliquis (and has been compliant with her medications).  Suggest:  Continue all CV medications  Amiodarone IV infusion for 24 hours, then convert to oral dosing  Echo to reassess LV function  Nothing to suggest ACS  She just converted to SR  (after amiodarone bolus).  Would defer further lasix for now, as she is in SR.  Will follow    Thank you    Myron Mederos MD  [facility]

## 2021-07-26 NOTE — ED ADULT NURSE NOTE - PSH
History of total right knee replacement  11/2018  S/P cataract extraction and insertion of intraocular lens    S/P knee surgery  R  S/P tubal ligation

## 2021-07-26 NOTE — ED ADULT NURSE NOTE - OBJECTIVE STATEMENT
assumed care at this time 0725. 71 y/o f a&ox3 comes to ED c/o SOB when laying flat and walking. pt. states she just had a virus. pt. states she has a hx. of a-fib, on Lopressor but did not take it because it triggers her asthma. denies fevers. pt. on Eliquis. MD. Mason at bedside to evaluate pt. 150 on cm.

## 2021-07-26 NOTE — ED PROVIDER NOTE - PROGRESS NOTE DETAILS
CXR concerning for left plural effusion. Followed by ED bedside U/S effusion was seen with B-lines bilaterally. Will order formal ECHO to rule out new onset heart failure. Fluid stopped, patient received 300 cc. Will rate control and consult cardiology. CXR with concern for pleural effusion/vascular congestion, bedside sono with b/l B lines to midlung fields and pleural effusion with reduced EF and septal wall abnormalitiy. fluids stopped received 200-300cc, lungs remain clear. will give metoprolol in setting of possible new CHF with reduced EF. cards consulted. TBA -Slowjasmyn DO patient with HR still in 140s, spoke with Dr. Mederos recommend amiodarone load will give 150mg, and admit. -Mason DO

## 2021-07-26 NOTE — H&P ADULT - NSICDXPASTMEDICALHX_GEN_ALL_CORE_FT
PAST MEDICAL HISTORY:  Asthma     Atrial fibrillation     Hepatitis B     Palpitations SVT, short runs VT on Holter

## 2021-07-26 NOTE — H&P ADULT - HISTORY OF PRESENT ILLNESS
71 yo F w/ hx asthma, afib on eliquis presents to ER for worsening palpitations and shortness of breath with associated chest pain.  Notes recent gastroenteritis that has since resolved but believes it provoked the palpitations.   In past patient takes an extra dose of metoprolol for elevated HR with resolution but this time did not work.  no improvement with albuterol use as well for SOB. Previously tried flecainide for rhythm control but did not tolerate. no hx amio use, cardioversion or ablation. last echo 6 months prior with no depressed ef.    in ER, given lopressor IV total 15mg, IV dig without improvement in HR, given lasix 20mg IVP and amiodarone 150mg IVPB.

## 2021-07-26 NOTE — ED ADULT NURSE NOTE - NSIMPLEMENTINTERV_GEN_ALL_ED
Implemented All Universal Safety Interventions:  [place] to call system. Call bell, personal items and telephone within reach. Instruct patient to call for assistance. Room bathroom lighting operational. Non-slip footwear when patient is off stretcher. Physically safe environment: no spills, clutter or unnecessary equipment. Stretcher in lowest position, wheels locked, appropriate side rails in place.

## 2021-07-27 LAB
ALBUMIN SERPL ELPH-MCNC: 3.5 G/DL — SIGNIFICANT CHANGE UP (ref 3.3–5.2)
ALP SERPL-CCNC: 105 U/L — SIGNIFICANT CHANGE UP (ref 40–120)
ALT FLD-CCNC: 161 U/L — HIGH
ANION GAP SERPL CALC-SCNC: 13 MMOL/L — SIGNIFICANT CHANGE UP (ref 5–17)
AST SERPL-CCNC: 78 U/L — HIGH
BASE EXCESS BLDA CALC-SCNC: -3.7 MMOL/L — LOW (ref -2–3)
BILIRUB SERPL-MCNC: 0.9 MG/DL — SIGNIFICANT CHANGE UP (ref 0.4–2)
BLOOD GAS COMMENTS ARTERIAL: SIGNIFICANT CHANGE UP
BUN SERPL-MCNC: 19.8 MG/DL — SIGNIFICANT CHANGE UP (ref 8–20)
CALCIUM SERPL-MCNC: 8.5 MG/DL — LOW (ref 8.6–10.2)
CHLORIDE SERPL-SCNC: 105 MMOL/L — SIGNIFICANT CHANGE UP (ref 98–107)
CO2 SERPL-SCNC: 19 MMOL/L — LOW (ref 22–29)
COVID-19 SPIKE DOMAIN AB INTERP: POSITIVE
COVID-19 SPIKE DOMAIN ANTIBODY RESULT: >250 U/ML — HIGH
CREAT SERPL-MCNC: 0.71 MG/DL — SIGNIFICANT CHANGE UP (ref 0.5–1.3)
GAS PNL BLDA: SIGNIFICANT CHANGE UP
GLUCOSE SERPL-MCNC: 111 MG/DL — HIGH (ref 70–99)
HCO3 BLDA-SCNC: 21 MMOL/L — SIGNIFICANT CHANGE UP (ref 21–28)
HCT VFR BLD CALC: 48.3 % — HIGH (ref 34.5–45)
HGB BLD-MCNC: 15.8 G/DL — HIGH (ref 11.5–15.5)
HOROWITZ INDEX BLDA+IHG-RTO: 21 — SIGNIFICANT CHANGE UP
MAGNESIUM SERPL-MCNC: 1.8 MG/DL — SIGNIFICANT CHANGE UP (ref 1.6–2.6)
MCHC RBC-ENTMCNC: 31.7 PG — SIGNIFICANT CHANGE UP (ref 27–34)
MCHC RBC-ENTMCNC: 32.7 GM/DL — SIGNIFICANT CHANGE UP (ref 32–36)
MCV RBC AUTO: 96.8 FL — SIGNIFICANT CHANGE UP (ref 80–100)
PCO2 BLDA: 34 MMHG — SIGNIFICANT CHANGE UP (ref 32–35)
PH BLDA: 7.4 — SIGNIFICANT CHANGE UP (ref 7.35–7.45)
PLATELET # BLD AUTO: 227 K/UL — SIGNIFICANT CHANGE UP (ref 150–400)
PO2 BLDA: 70 MMHG — LOW (ref 83–108)
POTASSIUM SERPL-MCNC: 4.8 MMOL/L — SIGNIFICANT CHANGE UP (ref 3.5–5.3)
POTASSIUM SERPL-SCNC: 4.8 MMOL/L — SIGNIFICANT CHANGE UP (ref 3.5–5.3)
PROT SERPL-MCNC: 6.5 G/DL — LOW (ref 6.6–8.7)
RBC # BLD: 4.99 M/UL — SIGNIFICANT CHANGE UP (ref 3.8–5.2)
RBC # FLD: 13.8 % — SIGNIFICANT CHANGE UP (ref 10.3–14.5)
SAO2 % BLDA: 94.7 % — SIGNIFICANT CHANGE UP (ref 94–98)
SARS-COV-2 IGG+IGM SERPL QL IA: >250 U/ML — HIGH
SARS-COV-2 IGG+IGM SERPL QL IA: POSITIVE
SODIUM SERPL-SCNC: 137 MMOL/L — SIGNIFICANT CHANGE UP (ref 135–145)
WBC # BLD: 12.87 K/UL — HIGH (ref 3.8–10.5)
WBC # FLD AUTO: 12.87 K/UL — HIGH (ref 3.8–10.5)

## 2021-07-27 PROCEDURE — 71275 CT ANGIOGRAPHY CHEST: CPT | Mod: 26

## 2021-07-27 PROCEDURE — 71045 X-RAY EXAM CHEST 1 VIEW: CPT | Mod: 26

## 2021-07-27 PROCEDURE — 99233 SBSQ HOSP IP/OBS HIGH 50: CPT

## 2021-07-27 PROCEDURE — 93010 ELECTROCARDIOGRAM REPORT: CPT

## 2021-07-27 PROCEDURE — 93971 EXTREMITY STUDY: CPT | Mod: 26,RT

## 2021-07-27 RX ORDER — FUROSEMIDE 40 MG
20 TABLET ORAL ONCE
Refills: 0 | Status: DISCONTINUED | OUTPATIENT
Start: 2021-07-27 | End: 2021-07-27

## 2021-07-27 RX ORDER — METOPROLOL TARTRATE 50 MG
25 TABLET ORAL ONCE
Refills: 0 | Status: COMPLETED | OUTPATIENT
Start: 2021-07-27 | End: 2021-07-27

## 2021-07-27 RX ORDER — LEVALBUTEROL 1.25 MG/.5ML
0.63 SOLUTION, CONCENTRATE RESPIRATORY (INHALATION) EVERY 6 HOURS
Refills: 0 | Status: DISCONTINUED | OUTPATIENT
Start: 2021-07-27 | End: 2021-07-30

## 2021-07-27 RX ORDER — AMIODARONE HYDROCHLORIDE 400 MG/1
200 TABLET ORAL DAILY
Refills: 0 | Status: DISCONTINUED | OUTPATIENT
Start: 2021-07-27 | End: 2021-07-27

## 2021-07-27 RX ORDER — ALPRAZOLAM 0.25 MG
0.5 TABLET ORAL EVERY 8 HOURS
Refills: 0 | Status: DISCONTINUED | OUTPATIENT
Start: 2021-07-27 | End: 2021-07-30

## 2021-07-27 RX ORDER — LEVALBUTEROL 1.25 MG/.5ML
1.25 SOLUTION, CONCENTRATE RESPIRATORY (INHALATION) ONCE
Refills: 0 | Status: COMPLETED | OUTPATIENT
Start: 2021-07-27 | End: 2021-07-27

## 2021-07-27 RX ORDER — FUROSEMIDE 40 MG
20 TABLET ORAL ONCE
Refills: 0 | Status: COMPLETED | OUTPATIENT
Start: 2021-07-27 | End: 2021-07-27

## 2021-07-27 RX ORDER — DIGOXIN 250 MCG
250 TABLET ORAL DAILY
Refills: 0 | Status: DISCONTINUED | OUTPATIENT
Start: 2021-07-27 | End: 2021-07-30

## 2021-07-27 RX ORDER — AMIODARONE HYDROCHLORIDE 400 MG/1
200 TABLET ORAL DAILY
Refills: 0 | Status: DISCONTINUED | OUTPATIENT
Start: 2021-07-27 | End: 2021-07-28

## 2021-07-27 RX ORDER — ALPRAZOLAM 0.25 MG
0.25 TABLET ORAL
Refills: 0 | Status: DISCONTINUED | OUTPATIENT
Start: 2021-07-27 | End: 2021-07-27

## 2021-07-27 RX ORDER — DIGOXIN 250 MCG
250 TABLET ORAL EVERY 4 HOURS
Refills: 0 | Status: COMPLETED | OUTPATIENT
Start: 2021-07-27 | End: 2021-07-27

## 2021-07-27 RX ORDER — MAGNESIUM SULFATE 500 MG/ML
2 VIAL (ML) INJECTION ONCE
Refills: 0 | Status: COMPLETED | OUTPATIENT
Start: 2021-07-27 | End: 2021-07-27

## 2021-07-27 RX ORDER — METOPROLOL TARTRATE 50 MG
25 TABLET ORAL EVERY 8 HOURS
Refills: 0 | Status: DISCONTINUED | OUTPATIENT
Start: 2021-07-27 | End: 2021-07-30

## 2021-07-27 RX ORDER — LANOLIN ALCOHOL/MO/W.PET/CERES
3 CREAM (GRAM) TOPICAL AT BEDTIME
Refills: 0 | Status: DISCONTINUED | OUTPATIENT
Start: 2021-07-27 | End: 2021-07-30

## 2021-07-27 RX ORDER — LEVALBUTEROL 1.25 MG/.5ML
0.63 SOLUTION, CONCENTRATE RESPIRATORY (INHALATION) ONCE
Refills: 0 | Status: DISCONTINUED | OUTPATIENT
Start: 2021-07-27 | End: 2021-07-27

## 2021-07-27 RX ORDER — METOPROLOL TARTRATE 50 MG
5 TABLET ORAL ONCE
Refills: 0 | Status: COMPLETED | OUTPATIENT
Start: 2021-07-27 | End: 2021-07-27

## 2021-07-27 RX ORDER — LOSARTAN POTASSIUM 100 MG/1
25 TABLET, FILM COATED ORAL DAILY
Refills: 0 | Status: DISCONTINUED | OUTPATIENT
Start: 2021-07-27 | End: 2021-07-30

## 2021-07-27 RX ORDER — FUROSEMIDE 40 MG
40 TABLET ORAL DAILY
Refills: 0 | Status: DISCONTINUED | OUTPATIENT
Start: 2021-07-27 | End: 2021-07-30

## 2021-07-27 RX ADMIN — Medication 0.5 MILLIGRAM(S): at 22:03

## 2021-07-27 RX ADMIN — LEVALBUTEROL 1.25 MILLIGRAM(S): 1.25 SOLUTION, CONCENTRATE RESPIRATORY (INHALATION) at 00:38

## 2021-07-27 RX ADMIN — Medication 250 MICROGRAM(S): at 19:29

## 2021-07-27 RX ADMIN — Medication 3 MILLIGRAM(S): at 22:03

## 2021-07-27 RX ADMIN — Medication 250 MICROGRAM(S): at 12:33

## 2021-07-27 RX ADMIN — LEVALBUTEROL 1.25 MILLIGRAM(S): 1.25 SOLUTION, CONCENTRATE RESPIRATORY (INHALATION) at 06:28

## 2021-07-27 RX ADMIN — LOSARTAN POTASSIUM 25 MILLIGRAM(S): 100 TABLET, FILM COATED ORAL at 08:34

## 2021-07-27 RX ADMIN — Medication 50 GRAM(S): at 14:51

## 2021-07-27 RX ADMIN — BUDESONIDE AND FORMOTEROL FUMARATE DIHYDRATE 2 PUFF(S): 160; 4.5 AEROSOL RESPIRATORY (INHALATION) at 22:08

## 2021-07-27 RX ADMIN — Medication 25 MILLIGRAM(S): at 08:56

## 2021-07-27 RX ADMIN — Medication 5 MILLIGRAM(S): at 04:30

## 2021-07-27 RX ADMIN — Medication 250 MICROGRAM(S): at 08:13

## 2021-07-27 RX ADMIN — LEVALBUTEROL 0.63 MILLIGRAM(S): 1.25 SOLUTION, CONCENTRATE RESPIRATORY (INHALATION) at 18:32

## 2021-07-27 RX ADMIN — LEVALBUTEROL 0.63 MILLIGRAM(S): 1.25 SOLUTION, CONCENTRATE RESPIRATORY (INHALATION) at 13:10

## 2021-07-27 RX ADMIN — Medication 25 MILLIGRAM(S): at 22:03

## 2021-07-27 RX ADMIN — APIXABAN 5 MILLIGRAM(S): 2.5 TABLET, FILM COATED ORAL at 05:08

## 2021-07-27 RX ADMIN — Medication 20 MILLIGRAM(S): at 04:27

## 2021-07-27 RX ADMIN — AMIODARONE HYDROCHLORIDE 200 MILLIGRAM(S): 400 TABLET ORAL at 19:29

## 2021-07-27 NOTE — PROGRESS NOTE ADULT - SUBJECTIVE AND OBJECTIVE BOX
CC: palpitations/AFIBB with RVR/new CM    INTERVAL HPI/OVERNIGHT EVENTS: Patient seen and examined. Heart rate not well controlled on current RX. Agreed to receive oral Lopressor 25 mg  po now. Patient states she is concerned over receiving increase BB dosing due to h/o asthma and prior issues with Lopressor. Also reports that she has not slept in almost 30 hrs which contributed to her heart rate being fast as per patient. Reports multiple deaths in family over past year including her , mother, and now brother in law. Also reports recent GI viral infection which then prompted issues which she perceived as an asthma attack. Denies chest pain at present, no SOB at rest, no nausea, vomiting , fever, chill.s     Vital Signs Last 24 Hrs  T(C): 36.7 (26 Jul 2021 23:26), Max: 37 (26 Jul 2021 15:24)  T(F): 98 (26 Jul 2021 23:26), Max: 98.6 (26 Jul 2021 15:24)  HR: 142 (27 Jul 2021 08:32) (66 - 148)  BP: 125/93 (27 Jul 2021 08:32) (100/73 - 125/93)  BP(mean): --  RR: 22 (27 Jul 2021 03:34) (18 - 24)  SpO2: 97% (27 Jul 2021 06:28) (92% - 97%)      PHYSICAL EXAM:  General: Appears well developed, well nourished, no acute distress  HEENT: Head: normocephalic, atraumatic  Eyes: Pupils equal and reactive  Neck: Supple, no carotid bruit, no JVD, no JVD  CARDIOVASCULAR: Rapid irregular S1 and S2, no murmur, rub, or gallop  LUNGS: Clear to auscultation bilaterally, no rales, rhonchi or wheeze  ABDOMEN: Soft, nontender, non-distended, positive bowel sounds, no mass or bruit  EXTREMITIES: Trace edema, distal pulses WNL  SKIN: Warm and dry with normal turgor  NEURO: Alert & oriented x 3, grossly intact  PSYCH: normal mood and affect      I&O's Detail      CARDIAC MARKERS ( 26 Jul 2021 21:29 )  x     / <0.01 ng/mL / x     / x     / x      CARDIAC MARKERS ( 26 Jul 2021 15:27 )  x     / <0.01 ng/mL / x     / x     / x      CARDIAC MARKERS ( 26 Jul 2021 07:51 )  x     / <0.01 ng/mL / x     / x     / x                                15.8   12.87 )-----------( 227      ( 27 Jul 2021 07:41 )             48.3     27 Jul 2021 07:41    137    |  105    |  19.8   ----------------------------<  111    4.8     |  19.0   |  0.71     Ca    8.5        27 Jul 2021 07:41  Mg     1.8       27 Jul 2021 07:41    TPro  6.5    /  Alb  3.5    /  TBili  0.9    /  DBili  x      /  AST  78     /  ALT  161    /  AlkPhos  105    27 Jul 2021 07:41      CAPILLARY BLOOD GLUCOSE        LIVER FUNCTIONS - ( 27 Jul 2021 07:41 )  Alb: 3.5 g/dL / Pro: 6.5 g/dL / ALK PHOS: 105 U/L / ALT: 161 U/L / AST: 78 U/L / GGT: x               MEDICATIONS  (STANDING):  aMIOdarone Infusion 0.5 mG/Min (16.7 mL/Hr) IV Continuous <Continuous>  aMIOdarone Infusion 1 mG/Min (33.3 mL/Hr) IV Continuous <Continuous>  budesonide 160 MICROgram(s)/formoterol 4.5 MICROgram(s) Inhaler 2 Puff(s) Inhalation two times a day  digoxin     Tablet 250 MICROGram(s) Oral daily  digoxin  Injectable 250 MICROGram(s) IV Push every 4 hours  losartan 25 milliGRAM(s) Oral daily  magnesium sulfate  IVPB 2 Gram(s) IV Intermittent once  metoprolol tartrate 50 milliGRAM(s) Oral every 8 hours    MEDICATIONS  (PRN):  ALBUTerol    90 MICROgram(s) HFA Inhaler 2 Puff(s) Inhalation every 6 hours PRN Shortness of Breath and/or Wheezing  ALPRAZolam 0.25 milliGRAM(s) Oral two times a day PRN anxiety      RADIOLOGY & ADDITIONAL TESTS:

## 2021-07-27 NOTE — CHART NOTE - NSCHARTNOTEFT_GEN_A_CORE
Asked to evaluate pt with red, swollen RUE. Pt. states she has had numerous blood draws, and IVs.  She states she is a hard stick.  On examination: RUE +erythema, +tenderness, moderate +edema/induration, pulses present, no drainage, +ROM, sensation/proprioception intact, skin intact  LUE + Hep lock/IV  fxn well, +mild erythema/induration/edema below IV site, pulses present, no drainage, +ROM, sensation/proprioception intact, skin intact    A/P: 71 yo F w/ hx asthma, afib on eliquis presents to ER for worsening palpitations and shortness of breath with associated chest pain.    Now on Amiadorone drip  Eliquis held for [facility]  Doppler RUE  Cold compresses  Elevate RUE  Pt is refusing Metoprolol 50mg, feels it interferes with her Asthma, she  was given an increased dose of Metoprolol 2 days ago and she felt extremely SOB  Will change to 25 mg Q8H with parameters  c/w Symbicort, and Xopenex

## 2021-07-27 NOTE — PROGRESS NOTE ADULT - ATTENDING COMMENTS
Patient seen and examined this morning with NP Jacinta. patient has had about 3 dearths in the family over the last year. In addition she developed a viral illness few days prior to admission.     PHYSICAL EXAM:  Constitutional: No acute distress, alert and oriented by 3  HEENT: AT/NC, EOMI, PERRLA, Normal conjunctiva, no pharyngeal erythema, moist oral mucosa  Respiratory: CTA BL, equal breath sounds, no crackles or wheezing  Cardiovascular: irregular rhythm and tachycardic, no edema  Gastrointestinal: soft, Non-tender, Non-distended + Bowel sounds, no rebound or guarding  Musculoskeletal: No joint edema  Neurological: CN 2-12 grossly intact, no focal deficits  Skin: warm, dry and intact  Psychiatric: normal mood and affect    Discussed with NP Jacinta and agree with above  Patient ok with Metoprolol 25. Myself and NP Jacinta attempted to educate patient on taking the higher dose however patient refused stating higher doses make her feel worse,  Patient started on dig by cardiology  Discussed with Dr. Rangel, red Paul. Plan for c and start po lasix.   will continue to monitor on tele.

## 2021-07-27 NOTE — CHART NOTE - NSCHARTNOTEFT_GEN_A_CORE
Pt states she feels like she is having an asthma attack  Pt states she cant catch her breath  Pt h/o asthma  CHF EF 20%  Pt sitting in bed NAD speaks easily in full sentences  B/P 121/87 P 120 R 22 PO 98% RA T 98  Lungs clear bilaterally, no wheeze no crackles  Heart regular, tachy  Xopenex neb X1   Pt declines further w/u, CXR, Labs and radiology at this time   NAD PO 98%  Continue to monitor Pt states she feels like she is having an asthma attack  Pt states she cant catch her breath  Pt h/o asthma  CHF EF 20%  Pt sitting in bed NAD speaks easily in full sentences  B/P 121/87 P 120 R 22 PO 98% RA T 98  Lungs clear bilaterally, decreased BS trever bases, no wheeze no crackles  Heart regular, tachy  Xopenex neb X1   Pt declines further w/u, CXR, Labs and radiology at this time   NAD PO 98%  Continue to monitor

## 2021-07-27 NOTE — CHART NOTE - NSCHARTNOTEFT_GEN_A_CORE
Pt O2 sat down to 86% while sleeping  2 L NC applied . O2 Sat 96%  A+OX3 cooperative NAD  Speaks easily in full sentences  Heart Tachycardic  Lungs decreased BS trever bases, few scattered crackles  VSS B/P 121/87 P    R 24 PO 96% 2 L NC afebrile  CXR  ABG  Lasix 20 mg IVP  Dr Lopez at bedside  CTA chest ordered  Continue to monitor Pt O2 sat down to 86% while sleeping  2 L NC applied . O2 Sat 96%  A+OX3 cooperative NAD  Speaks easily in full sentences  Heart Tachycardic  Lungs decreased BS trever bases, few scattered crackles  VSS B/P 121/87 P    R 24 PO 96% 2 L NC afebrile  CXR  ABG  ABG - ( 27 Jul 2021 04:01 )  pH, Arterial: 7.400 pH, Blood: x     /  pCO2: 34    /  pO2: 70    / HCO3: 21    / Base Excess: -3.7  /  SaO2: 94.7   Lasix 20 mg IVP  Dr Lopez at bedside  CTA chest ordered  Continue to monitor Pt O2 sat down to 86% while sleeping  2 L NC applied . O2 Sat 96%  A+OX3 cooperative NAD  Speaks easily in full sentences  Heart Tachycardic  Lungs decreased BS trever bases, few scattered crackles  VSS B/P 121/87 P    R 24 PO 96% 2 L NC afebrile  CXR + congestion  ABG - ( 27 Jul 2021 04:01 )  pH, Arterial: 7.400 pH, Blood: x     /  pCO2: 34    /  pO2: 70    / HCO3: 21    / Base Excess: -3.7  /  SaO2: 94.7  EKG a fib RVR rate 146  Lopressor 5 mg IVP   Lasix 20 mg IVP  2 L NC  Dr Lopez at bedside  CTA chest ordered  Continue to monitor Pt O2 sat down to 86% while sleeping  2 L NC applied . O2 Sat 96%  A+OX3 cooperative NAD  Speaks easily in full sentences  Heart Tachycardic  Lungs decreased BS trever bases, few scattered crackles  VSS B/P 121/87 P    R 24 PO 96% 2 L NC afebrile  CXR + congestion  ABG - ( 27 Jul 2021 04:01 )  pH, Arterial: 7.400 pH, Blood: x     /  pCO2: 34    /  pO2: 70    / HCO3: 21    / Base Excess: -3.7  /  SaO2: 94.7  EKG a fib RVR rate 146  Lopressor 5 mg IVP   Lasix 20 mg IVP  Continuous PO  2 L NC  Dr Lopez at bedside  CTA chest ordered  Continue to monitor Pt O2 sat down to 86% while sleeping  2 L NC applied . O2 Sat 96%  A+OX3 cooperative NAD  Speaks easily in full sentences  Heart Tachycardic  Lungs decreased BS trever bases, few scattered crackles  VSS B/P 121/87 P    R 24 PO 96% 2 L NC afebrile  CXR + congestion  ABG - ( 27 Jul 2021 04:01 )  pH, Arterial: 7.400 pH, Blood: x     /  pCO2: 34    /  pO2: 70    / HCO3: 21    / Base Excess: -3.7  /  SaO2: 94.7  EKG a fib RVR rate 146  Lopressor 5 mg IVP   Lasix 20 mg IVP  Continuous PO  2 L NC  Xopenex neb  Dr Lopez at bedside  CTA chest ordered  Continue to monitor

## 2021-07-27 NOTE — PROGRESS NOTE ADULT - SUBJECTIVE AND OBJECTIVE BOX
[facility]                                                    [address], [address], [place] [address]                                                         PHONE: [phone]    FAX: [phone] [address] [facility], [address], [place] [address]                                                 PHONE: [phone]    FAX: [phone]  *******************************************************************************  cc: GILMA    HPI: 70F presented with SOB and GILMA. Echo on this admit with EF <20%, new as compared to prior echo studies. Echo 2/4/21 with EF 60-65%, LVH, mild MR/trace TR. Exercise nuclear stress test 12/12/19 EF 79%, no ischemia. Hx of PAF and intolerance of flecainide.           Overnight events/Subjective Assessment:    INTERPRETATION OF TELEMETRY (personally reviewed):    PAST MEDICAL & SURGICAL HISTORY:  Asthma    Hepatitis B    Palpitations  SVT, short runs VT on Holter    Atrial fibrillation    S/P knee surgery  R    S/P tubal ligation    S/P cataract extraction and insertion of intraocular lens    History of total right knee replacement  11/2018        carisoprodol (Hives)      MEDICATIONS  (STANDING):  aMIOdarone Infusion 0.5 mG/Min (16.7 mL/Hr) IV Continuous <Continuous>  aMIOdarone Infusion 1 mG/Min (33.3 mL/Hr) IV Continuous <Continuous>  apixaban 5 milliGRAM(s) Oral every 12 hours  budesonide 160 MICROgram(s)/formoterol 4.5 MICROgram(s) Inhaler 2 Puff(s) Inhalation two times a day  levalbuterol Inhalation 1.25 milliGRAM(s) Inhalation once  metoprolol tartrate 50 milliGRAM(s) Oral every 8 hours    MEDICATIONS  (PRN):  ALBUTerol    90 MICROgram(s) HFA Inhaler 2 Puff(s) Inhalation every 6 hours PRN Shortness of Breath and/or Wheezing      Vital Signs Last 24 Hrs  T(C): 36.7 (26 Jul 2021 23:26), Max: 37 (26 Jul 2021 06:42)  T(F): 98 (26 Jul 2021 23:26), Max: 98.6 (26 Jul 2021 06:42)  HR: 126 (27 Jul 2021 03:34) (66 - 150)  BP: 118/77 (27 Jul 2021 03:34) (100/73 - 136/81)  BP(mean): --  RR: 22 (27 Jul 2021 03:34) (18 - 24)  SpO2: 92% (27 Jul 2021 03:34) (92% - 97%)    I&O's Detail    I&O's Summary          PHYSICAL EXAM:  General: Appears well developed, well nourished, no acute distress. not in acute pain  HEAD: normal cephalic. Atraumatic  PUPILS: equal and reactive to light  EARS: normal hearing  NECK: supple. no JVD or HJR. no carotid bruits. no visible lymphadenopathy  NOSE: no gross abnormalities  CHEST: symmetric chest wall expansion  CARDIOVASCULAR: Normal rate. Regular rhythm. Normal S1 and S2, no S3/S4,  no murmur, rub, or gallop  LUNGS: Normal effort. Normal respiratory rate. Breath sounds are clear to auscultation bilaterally. No respiratory distress. No stridor.  no rales, rhonchi or wheeze. no decreased Breath sounds  ABDOMEN: Soft, nontender, non-distended, positive bowel sounds, no mass or bruit. no abdominal tenderness. No rebound. no ascites  EXTREMITIES: No clubbing, cyanosis or edema. normal range of motion  PULSES:  distal pulses WNL  SKIN: Warm and dry with normal turgor. no visible rash or cyanosis   NEURO: Alert & oriented x 3, grossly intact with no focal weakness  PSYCH: normal mood and affect. Grossly normal insight and judgement exhibited    FAMILY HISTORY:  Family history of malignant hyperthermia (Child)    Family history of coronary artery disease (Father) with CABG, mother with AF        SOCIAL HISTORY:   former smoking quit 30 yr ago . No ETOH/No IVDA    REVIEW OF SYSTEMS:  Constitutional: no fever, chills or malaise. No weight loss  Head: no trauma  Eyes: no visual deficit. No double vision  Ears: no hearing deficit or ringing in the ears  Nose: no nose bleeds or smell changes or congestion  Throat: no difficult swallowing or painful swallowing  Neck: supple. No lymphadenopathy or swelling  Respiratory: no SOB, wheeze, asthma, COPD. No cough. No blood in the sputum  Cardiovascular: no CP, palpitations, irregular heart beats. No edema. No PND. No orthopnea. No skin/temperature or color changes  Gastrointestinal: no abdominal pain. No constipation. No diarrhea. No melena. No nausea. No vomiting. No bloating  Genitourinary: no frequency or urgency. No hematuria  Lymphatics: no grossly swollen lymph nodes  Musculoskeletal: no limitation of range of motion. Normal strength. No pain  Integumentary: no visible rash. No itching  Neurologic: no HA. No TIA or stroke symptoms. No seizure. No hx of epilepsy. No tingling or numbness. No weakness. No dizziness  Psychiatric: denied. Reports appropriate mood.        LABS:                        15.0   7.59  )-----------( 279      ( 26 Jul 2021 07:51 )             46.2     07-26    141  |  107  |  23.7<H>  ----------------------------<  135<H>  4.9   |  20.0<L>  |  0.84    Ca    9.2      26 Jul 2021 07:51  Mg     2.0     07-26    TPro  6.7  /  Alb  4.2  /  TBili  0.5  /  DBili  x   /  AST  137<H>  /  ALT  201<H>  /  AlkPhos  142<H>  07-26    CARDIAC MARKERS ( 26 Jul 2021 21:29 )  x     / <0.01 ng/mL / x     / x     / x      CARDIAC MARKERS ( 26 Jul 2021 15:27 )  x     / <0.01 ng/mL / x     / x     / x      CARDIAC MARKERS ( 26 Jul 2021 07:51 )  x     / <0.01 ng/mL / x     / x     / x            Serum Pro-Brain Natriuretic Peptide: 5303 pg/mL (07-26 @ 07:51)  serum  Lipids:     Thyroid Stimulating Hormone, Serum: 2.16 uIU/mL (07-26 @ 07:51)      RADIOLOGY & ADDITIONAL STUDIES:    ECG:    ECHO: < from: TTE Echo Complete w/ Contrast w/ Doppler (07.26.21 @ 17:09) >  Summary:   1. Technically difficult study.   2. Endocardial visualization was enhanced with intravenous echo contrast.   3. Left ventricular ejection fraction, by visual estimation, is <20%.   4. Severely decreased global left ventricular systolic function.   5. The mitral in-flow pattern reveals no discernable A-wave, therefore no comment on diastolic function can be made.   6. Moderately enlarged right ventricle with severely reduced systolic function, estimated PASP least 42 mmHg, dilated IVC size 2.5 cm.   7. Mildly enlarged left atrium.   8. Severely enlarged right atrium.   9. Mild to moderate mitral valve regurgitation.  10. Mild tricuspid regurgitation.  11. Moderate bilateral pleural effusion in the left greater than right regions.  12. There is no evidence of pericardial effusion.  13. No prior study is available for comparison.    < end of copied text >        ASSESSMENT AND PLAN:  In summary, MELO BERRIOS is a 70y Female with past medical history significant for     New CM, unspecified type.  EF <20%  RV enlargement and hypokinesis with a dilated IVC  Hypoxia  GILMA Rangel MD                                                               [facility]                                                    [address], [address], [place] [address]                                                         PHONE: [phone]    FAX: [phone] [address] [facility], [address], [place] [address]                                                 PHONE: [phone]    FAX: [phone]  *******************************************************************************  cc: GILMA    HPI: 70F presented with SOB and GILMA. Hx of PAF. Echo on this admit with EF <20%, new as compared to prior echo studies. Echo 21 with EF 60-65%, LVH, mild MR/trace TR. Exercise nuclear stress test 19 EF 79%, no ischemia. Hx of PAF and intolerance of flecainide. Hx of asthma. Non diabetic. Former smoker          Overnight events/Subjective Assessment: Recurrent SOB    INTERPRETATION OF TELEMETRY (personally reviewed): PAF with HR to 150's    PAST MEDICAL & SURGICAL HISTORY:  Asthma    Hepatitis B    Palpitations  SVT, short runs VT on Holter    Atrial fibrillation    S/P knee surgery  R    S/P tubal ligation    S/P cataract extraction and insertion of intraocular lens    History of total right knee replacement  2018        carisoprodol (Hives)      MEDICATIONS  (STANDING):  aMIOdarone Infusion 0.5 mG/Min (16.7 mL/Hr) IV Continuous <Continuous>  aMIOdarone Infusion 1 mG/Min (33.3 mL/Hr) IV Continuous <Continuous>  apixaban 5 milliGRAM(s) Oral every 12 hours  budesonide 160 MICROgram(s)/formoterol 4.5 MICROgram(s) Inhaler 2 Puff(s) Inhalation two times a day  levalbuterol Inhalation 1.25 milliGRAM(s) Inhalation once  metoprolol tartrate 50 milliGRAM(s) Oral every 8 hours    MEDICATIONS  (PRN):  ALBUTerol    90 MICROgram(s) HFA Inhaler 2 Puff(s) Inhalation every 6 hours PRN Shortness of Breath and/or Wheezing      Vital Signs Last 24 Hrs  T(C): 36.7 (2021 23:26), Max: 37 (2021 06:42)  T(F): 98 (2021 23:26), Max: 98.6 (2021 06:42)  HR: 126 (2021 03:34) (66 - 150)  BP: 118/77 (2021 03:34) (100/73 - 136/81)  BP(mean): --  RR: 22 (2021 03:34) (18 - 24)  SpO2: 92% (2021 03:34) (92% - 97%)    I&O's Detail    I&O's Summary          PHYSICAL EXAM:  General: Appears well developed, well nourished, no acute distress. not in acute pain  HEAD: normal cephalic. Atraumatic  PUPILS: equal and reactive to light  EARS: normal hearing  NECK: supple. no JVD or HJR. no carotid bruits. no visible lymphadenopathy  NOSE: no gross abnormalities  CHEST: symmetric chest wall expansion  CARDIOVASCULAR: increased rate. irregular irregular rhythm. Normal S1 and S2, no S3/S4,  no murmur, rub, or gallop  LUNGS: Normal effort. Normal respiratory rate. Breath sounds are clear to auscultation bilaterally. No respiratory distress. No stridor.  no rales, rhonchi or wheeze. no decreased Breath sounds  ABDOMEN: Soft, nontender, non-distended, positive bowel sounds, no mass or bruit. no abdominal tenderness. No rebound. no ascites  EXTREMITIES: No clubbing, cyanosis or edema. normal range of motion  PULSES:  distal pulses WNL  SKIN: Warm and dry with normal turgor. no visible rash or cyanosis   NEURO: Alert & oriented x 3, grossly intact with no focal weakness  PSYCH: normal mood and affect. Grossly normal insight and judgement exhibited    FAMILY HISTORY:  Family history of malignant hyperthermia (Child)    Family history of coronary artery disease (Father) with CABG, mother with AF        SOCIAL HISTORY:   former smoking quit 30 yr ago . No ETOH/No IVDA    REVIEW OF SYSTEMS:  Constitutional: no fever, chills or malaise. No weight loss  Head: no trauma  Eyes: no visual deficit. No double vision  Ears: no hearing deficit or ringing in the ears  Nose: no nose bleeds or smell changes or congestion  Throat: no difficult swallowing or painful swallowing  Neck: supple. No lymphadenopathy or swelling  Respiratory: + SOB, wheeze, asthma, COPD. No cough. No blood in the sputum  Cardiovascular: no CP, palpitations, irregular heart beats. No edema. No PND. No orthopnea. No skin/temperature or color changes  Gastrointestinal: no abdominal pain. No constipation. No diarrhea. No melena. No nausea. No vomiting. No bloating  Genitourinary: no frequency or urgency. No hematuria  Lymphatics: no grossly swollen lymph nodes  Musculoskeletal: no limitation of range of motion. Normal strength. No pain  Integumentary: no visible rash. No itching  Neurologic: no HA. No TIA or stroke symptoms. No seizure. No hx of epilepsy. No tingling or numbness. No weakness. No dizziness  Psychiatric: denied. Reports appropriate mood.        LABS:                        15.0   7.59  )-----------( 279      ( 2021 07:51 )             46.2         141  |  107  |  23.7<H>  ----------------------------<  135<H>  4.9   |  20.0<L>  |  0.84    Ca    9.2      2021 07:51  Mg     2.0         TPro  6.7  /  Alb  4.2  /  TBili  0.5  /  DBili  x   /  AST  137<H>  /  ALT  201<H>  /  AlkPhos  142<H>      CARDIAC MARKERS ( 2021 21:29 )  x     / <0.01 ng/mL / x     / x     / x      CARDIAC MARKERS ( 2021 15:27 )  x     / <0.01 ng/mL / x     / x     / x      CARDIAC MARKERS ( 2021 07:51 )  x     / <0.01 ng/mL / x     / x     / x            Serum Pro-Brain Natriuretic Peptide: 5303 pg/mL ( @ 07:51)  serum  Lipids:     Thyroid Stimulating Hormone, Serum: 2.16 uIU/mL ( @ 07:51)      RADIOLOGY & ADDITIONAL STUDIES:    EC/26  low volts limb leads. PRWP entirety of precordium    ECHO: < from: TTE Echo Complete w/ Contrast w/ Doppler (21 @ 17:09) >  Summary:   1. Technically difficult study.   2. Endocardial visualization was enhanced with intravenous echo contrast.   3. Left ventricular ejection fraction, by visual estimation, is <20%.   4. Severely decreased global left ventricular systolic function.   5. The mitral in-flow pattern reveals no discernable A-wave, therefore no comment on diastolic function can be made.   6. Moderately enlarged right ventricle with severely reduced systolic function, estimated PASP least 42 mmHg, dilated IVC size 2.5 cm.   7. Mildly enlarged left atrium.   8. Severely enlarged right atrium.   9. Mild to moderate mitral valve regurgitation.  10. Mild tricuspid regurgitation.  11. Moderate bilateral pleural effusion in the left greater than right regions.  12. There is no evidence of pericardial effusion.  13. No prior study is available for comparison.    < end of copied text >    < from: Xray Chest 2 Views PA/Lat (21 @ 08:13) >  IMPRESSION:  Bilateral lower lobe patchy infiltrate/areas of atelectasis and pleural effusions, left greater than right    < end of copied text >      ASSESSMENT AND PLAN:  In summary, MELO BERRIOS is a 70y Female with past medical history significant for presented with SOB and GILMA. Hx of PAF. Echo on this admit with EF <20%, new as compared to prior echo studies. Echo 21 with EF 60-65%, LVH, mild MR/trace TR. Exercise nuclear stress test 19 EF 79%, no ischemia. Hx of PAF and intolerance of flecainide. Hx of asthma. Non diabetic. Former smoker    New CM, unspecified type.  EF <20%. Will need cardiac cath to assess for reversible etiology/possible ischemia.  Risks, benefits, treatment alternatives dw the pt. The patient is in agreement and wishes to proceed    RV enlargement and hypokinesis with a dilated IVC. CTA of the chest planned. Also in the differential is 4 chamber CM.  Pt does report a recent URI/viral illness    Hypoxia. CTA planned to r/o PE.  Pt with pleural effusions and acute systolic CHF. Lasix.  Add ACEI.    GILMA. metoprolol was increased. Pt attributes her increased HR to Metoprolol, but more likely due to GILMA. Add digoxin. on amiodarone, metoprolol    PAF. Now faily persistent. Plan as above    Asthma. Medications as per medical    Will hold eliquis in anticipation of cardiac cath. No hx of TIA or CVA. Pt is a nurse at [employer] and is in agreement with the plan    IV amiodarone to be continued for now    Metoprolol 50mg po q8    To add digoxin and losartan.    - Telemetry monitoring personally reviewed by me. GILMA    - ECG personally reviewed by me    - Echocardiogram imaging ordred    - radiologic imaging reviewed    - Laboratory data reviewed.    - I spoke with evening and current day nursing and discussed the plan    - I have personally reviewed all obtainable prior records and data        Sheron Rangel MD

## 2021-07-27 NOTE — PROGRESS NOTE ADULT - ASSESSMENT
69 yo F w/ hx asthma, afib on eliquis presents to ER for worsening palpitations and shortness of breath with associated chest pain.  Notes recent gastroenteritis that has since resolved but believes it provoked the palpitations.   hx flecainide use in past, non tolerable. New CM  EF <20%, afibb with RVR, Hypoxia        Afibb with RVR    On amiodarone drip     hold eliquis for eventual LHC     dig load     Metoprolol as per Cardiology, Lopressor increased to 50 mg TID     Transfer to [address] [place] [address]    New CM:  EF <20%. As per Cardiology, will need cardiac cath to assess for reversible etiology/possible ischemia.   Started on ARB.    Hypoxia: CTA to R/O PE. Supplemental oxygen as needed,     transaminitis: possibly due to hepatic congestion    negative hepatitis panel    trend, lower today    asthma: not in acute exacerbation    symbicort (advair not on formulary)   change albuterol to xopenex due to elevated heart rates    Anxiety/Insomnia  Xanax as needed  Add Melatonin 69 yo F w/ hx asthma, afib on eliquis presents to ER for worsening palpitations and shortness of breath with associated chest pain.  Notes recent gastroenteritis that has since resolved but believes it provoked the palpitations.   hx flecainide use in past, non tolerable. New CM  EF <20%, afibb with RVR, Hypoxia        Afibb with RVR    On amiodarone drip     hold eliquis for eventual LHC     dig load     Metoprolol as per Cardiology, Lopressor increased to 50 mg TID     Transfer to [address] [place] [address]    New CM:  EF <20%. As per Cardiology, will need cardiac cath to assess for reversible etiology/possible ischemia.   Started on ARB.    Acute Hypoxic resp failure: CTA to R/O PE. Supplemental oxygen as needed,     transaminitis: possibly due to hepatic congestion    negative hepatitis panel    trend, lower today    asthma: not in acute exacerbation    symbicort (advair not on formulary)   change albuterol to xopenex due to elevated heart rates    Anxiety/Insomnia  Xanax as needed  Add Melatonin

## 2021-07-28 LAB
ALBUMIN SERPL ELPH-MCNC: 3.2 G/DL — LOW (ref 3.3–5.2)
ALP SERPL-CCNC: 95 U/L — SIGNIFICANT CHANGE UP (ref 40–120)
ALT FLD-CCNC: 98 U/L — HIGH
ANION GAP SERPL CALC-SCNC: 9 MMOL/L — SIGNIFICANT CHANGE UP (ref 5–17)
AST SERPL-CCNC: 31 U/L — SIGNIFICANT CHANGE UP
BILIRUB SERPL-MCNC: 1 MG/DL — SIGNIFICANT CHANGE UP (ref 0.4–2)
BUN SERPL-MCNC: 10.9 MG/DL — SIGNIFICANT CHANGE UP (ref 8–20)
CALCIUM SERPL-MCNC: 8.2 MG/DL — LOW (ref 8.6–10.2)
CHLORIDE SERPL-SCNC: 107 MMOL/L — SIGNIFICANT CHANGE UP (ref 98–107)
CO2 SERPL-SCNC: 26 MMOL/L — SIGNIFICANT CHANGE UP (ref 22–29)
CREAT SERPL-MCNC: 0.72 MG/DL — SIGNIFICANT CHANGE UP (ref 0.5–1.3)
DIGOXIN SERPL-MCNC: 1 NG/ML — SIGNIFICANT CHANGE UP (ref 0.8–2)
GLUCOSE SERPL-MCNC: 108 MG/DL — HIGH (ref 70–99)
HCT VFR BLD CALC: 42.7 % — SIGNIFICANT CHANGE UP (ref 34.5–45)
HGB BLD-MCNC: 14.1 G/DL — SIGNIFICANT CHANGE UP (ref 11.5–15.5)
MAGNESIUM SERPL-MCNC: 2 MG/DL — SIGNIFICANT CHANGE UP (ref 1.6–2.6)
MCHC RBC-ENTMCNC: 31.7 PG — SIGNIFICANT CHANGE UP (ref 27–34)
MCHC RBC-ENTMCNC: 33 GM/DL — SIGNIFICANT CHANGE UP (ref 32–36)
MCV RBC AUTO: 96 FL — SIGNIFICANT CHANGE UP (ref 80–100)
PLATELET # BLD AUTO: 214 K/UL — SIGNIFICANT CHANGE UP (ref 150–400)
POTASSIUM SERPL-MCNC: 4.4 MMOL/L — SIGNIFICANT CHANGE UP (ref 3.5–5.3)
POTASSIUM SERPL-SCNC: 4.4 MMOL/L — SIGNIFICANT CHANGE UP (ref 3.5–5.3)
PROT SERPL-MCNC: 5.6 G/DL — LOW (ref 6.6–8.7)
RBC # BLD: 4.45 M/UL — SIGNIFICANT CHANGE UP (ref 3.8–5.2)
RBC # FLD: 13.9 % — SIGNIFICANT CHANGE UP (ref 10.3–14.5)
SODIUM SERPL-SCNC: 142 MMOL/L — SIGNIFICANT CHANGE UP (ref 135–145)
WBC # BLD: 12.05 K/UL — HIGH (ref 3.8–10.5)
WBC # FLD AUTO: 12.05 K/UL — HIGH (ref 3.8–10.5)

## 2021-07-28 PROCEDURE — 99233 SBSQ HOSP IP/OBS HIGH 50: CPT

## 2021-07-28 RX ORDER — AMIODARONE HYDROCHLORIDE 400 MG/1
200 TABLET ORAL DAILY
Refills: 0 | Status: COMPLETED | OUTPATIENT
Start: 2021-07-28 | End: 2021-07-28

## 2021-07-28 RX ORDER — AMIODARONE HYDROCHLORIDE 400 MG/1
200 TABLET ORAL EVERY 8 HOURS
Refills: 0 | Status: DISCONTINUED | OUTPATIENT
Start: 2021-07-29 | End: 2021-07-30

## 2021-07-28 RX ORDER — ACETAMINOPHEN 500 MG
975 TABLET ORAL ONCE
Refills: 0 | Status: COMPLETED | OUTPATIENT
Start: 2021-07-28 | End: 2021-07-28

## 2021-07-28 RX ADMIN — LEVALBUTEROL 0.63 MILLIGRAM(S): 1.25 SOLUTION, CONCENTRATE RESPIRATORY (INHALATION) at 05:40

## 2021-07-28 RX ADMIN — LEVALBUTEROL 0.63 MILLIGRAM(S): 1.25 SOLUTION, CONCENTRATE RESPIRATORY (INHALATION) at 18:04

## 2021-07-28 RX ADMIN — AMIODARONE HYDROCHLORIDE 200 MILLIGRAM(S): 400 TABLET ORAL at 13:40

## 2021-07-28 RX ADMIN — AMIODARONE HYDROCHLORIDE 200 MILLIGRAM(S): 400 TABLET ORAL at 21:45

## 2021-07-28 RX ADMIN — Medication 3 MILLIGRAM(S): at 21:46

## 2021-07-28 RX ADMIN — LOSARTAN POTASSIUM 25 MILLIGRAM(S): 100 TABLET, FILM COATED ORAL at 06:08

## 2021-07-28 RX ADMIN — Medication 40 MILLIGRAM(S): at 06:08

## 2021-07-28 RX ADMIN — AMIODARONE HYDROCHLORIDE 200 MILLIGRAM(S): 400 TABLET ORAL at 06:08

## 2021-07-28 RX ADMIN — Medication 25 MILLIGRAM(S): at 13:40

## 2021-07-28 RX ADMIN — Medication 25 MILLIGRAM(S): at 06:09

## 2021-07-28 RX ADMIN — Medication 25 MILLIGRAM(S): at 21:45

## 2021-07-28 RX ADMIN — Medication 0.5 MILLIGRAM(S): at 23:05

## 2021-07-28 RX ADMIN — BUDESONIDE AND FORMOTEROL FUMARATE DIHYDRATE 2 PUFF(S): 160; 4.5 AEROSOL RESPIRATORY (INHALATION) at 23:06

## 2021-07-28 RX ADMIN — Medication 250 MICROGRAM(S): at 06:09

## 2021-07-28 NOTE — PROGRESS NOTE ADULT - ASSESSMENT
70y woman with known paroxsymal afib (dx 2020) maintained on eliquis with reported intolerance to flecainide, admitted following several days of progressive shortness of breath with recent bout of gastroenteritis. In ED, found to have acute decompensated biventricular heart failure and of rapid atrial fibrillation.      Afib with RVR  - Still uncontrolled  - Now off amiodarone drip  - hold eliquis for eventual LHC  - Now on Digoxin, Metoprolol  - Amiodarone dose increased today   - Monitor and replace lytes     New CM  - EF <20%,   - Plan for cardiac cath tomorrow   - C/w BB, atarted on ARB    Acute Hypoxic resp failure  - CTA: No evidence of pulmonary embolism. Bilateral layering pleural effusions. Minimal left upper lobe groundglass opacity, likely postinflammatory.  - Supplemental oxygen as needed     Transaminitis  - possibly due to hepatic congestion  - negative hepatitis panel  - Trend     Asthma  - not in acute exacerbation  - symbicort (advair not on formulary)   - change albuterol to xopenex due to elevated heart rates    Anxiety/Insomnia  - Xanax as needed  - Melatonin

## 2021-07-28 NOTE — PROCEDURE NOTE - NSPROCDETAILS_GEN_ALL_CORE
location identified, draped/prepped, sterile technique used/sterile dressing applied/sterile technique, catheter placed/supine position/ultrasound guidance
location identified, draped/prepped, sterile technique used/blood seen on insertion/dressing applied/flushes easily/secured in place/sterile technique, catheter placed

## 2021-07-28 NOTE — PROGRESS NOTE ADULT - SUBJECTIVE AND OBJECTIVE BOX
[facility], [facility]                                          [address], [address], [place] [address]                                               PHONE: [phone]    FAX: [phone] [address], [address], [place] [address]                                       PHONE: [phone]    FAX: [phone]  *******************************************************************************    Overnight events/Subjective Assessment: Patient with intermittently elevated HR overnight.  Refusing increased dose of metoprolol stating that this causes wheezing.  Otherwise statesthat she is feeling much better and denies any chest pain, palpitations, shortness of breath, syncope or near syncope.     INTERPRETATION OF TELEMETRY (personally reviewed):    carisoprodol (Hives)    MEDICATIONS  (STANDING):  acetaminophen   Tablet .. 975 milliGRAM(s) Oral once  aMIOdarone    Tablet 200 milliGRAM(s) Oral daily  aMIOdarone    Tablet 200 milliGRAM(s) Oral daily  aMIOdarone Infusion 0.5 mG/Min (16.7 mL/Hr) IV Continuous <Continuous>  aMIOdarone Infusion 1 mG/Min (33.3 mL/Hr) IV Continuous <Continuous>  budesonide 160 MICROgram(s)/formoterol 4.5 MICROgram(s) Inhaler 2 Puff(s) Inhalation two times a day  digoxin     Tablet 250 MICROGram(s) Oral daily  furosemide    Tablet 40 milliGRAM(s) Oral daily  losartan 25 milliGRAM(s) Oral daily  melatonin 3 milliGRAM(s) Oral at bedtime  metoprolol tartrate 25 milliGRAM(s) Oral every 8 hours    MEDICATIONS  (PRN):  ALPRAZolam 0.5 milliGRAM(s) Oral every 8 hours PRN anxiety  levalbuterol Inhalation 0.63 milliGRAM(s) Inhalation every 6 hours PRN Shortness of breath or weezing      Vital Signs Last 24 Hrs  T(C): 36.8 (28 Jul 2021 08:24), Max: 36.8 (28 Jul 2021 08:24)  T(F): 98.3 (28 Jul 2021 08:24), Max: 98.3 (28 Jul 2021 08:24)  HR: 130 (28 Jul 2021 09:20) (60 - 140)  BP: 110/73 (28 Jul 2021 09:20) (110/73 - 142/88)  BP(mean): --  RR: 18 (28 Jul 2021 08:24) (18 - 20)  SpO2: 94% (28 Jul 2021 08:24) (93% - 98%)    I&O's Detail    27 Jul 2021 07:01  -  28 Jul 2021 07:00  --------------------------------------------------------  IN:    Amiodarone: 116.9 mL  Total IN: 116.9 mL    OUT:  Total OUT: 0 mL    Total NET: 116.9 mL        I&O's Summary    27 Jul 2021 07:01  -  28 Jul 2021 07:00  --------------------------------------------------------  IN: 116.9 mL / OUT: 0 mL / NET: 116.9 mL            PHYSICAL EXAM:  General: Appears well developed, well nourished, no acute distress  HEENT: Head: normocephalic, atraumatic  Eyes: Pupils equal and reactive  Neck: Supple, no carotid bruit, no JVD, no HJR  CARDIOVASCULAR: irregularly irregular tachycardia normal S1 and S2, no murmur, rub, or gallop  LUNGS: Clear to auscultation bilaterally, no rales, rhonchi or wheeze  ABDOMEN: Soft, nontender, non-distended, positive bowel sounds, no mass or bruit  EXTREMITIES: No edema, distal pulses WNL  SKIN: Warm and dry with normal turgor  NEURO: Alert & oriented x 3, grossly intact  PSYCH: normal mood and affect        LABS:                        14.1   12.05 )-----------( 214      ( 28 Jul 2021 05:26 )             42.7     07-28    142  |  107  |  10.9  ----------------------------<  108<H>  4.4   |  26.0  |  0.72    Ca    8.2<L>      28 Jul 2021 05:26  Mg     2.0     07-28    TPro  5.6<L>  /  Alb  3.2<L>  /  TBili  1.0  /  DBili  x   /  AST  31  /  ALT  98<H>  /  AlkPhos  95  07-28    CARDIAC MARKERS ( 26 Jul 2021 21:29 )  x     / <0.01 ng/mL / x     / x     / x      CARDIAC MARKERS ( 26 Jul 2021 15:27 )  x     / <0.01 ng/mL / x     / x     / x            Serum Pro-Brain Natriuretic Peptide: 5303 pg/mL (07-26 @ 07:51)  serum  Lipids:     Thyroid Stimulating Hormone, Serum: 2.16 uIU/mL (07-26 @ 07:51)      RADIOLOGY & ADDITIONAL STUDIES REVIEWED       ASSESSMENT AND PLAN:  In summary, MELO BERRIOS is a 70y woman with known paroxsymal afib (dx 2020) maintained on eliquis with reported intolerance to flecainide, admitted following several days of progressive shortness of breath which seems to have began following "viral gastroenteritis." found to have acute decompensated biventricular heart failure in the setting of rapid atrial fibrillation.      - The patient has been chest pain free since admission with negative serial troponin and has now ruled out for acute MI.  After discussing risks, benefits, and alternatives, the patient is scheduled to undergo coronary angiography tomorrow (7/29/2021) to define coronary anatomy and rule out ischemic etiology of new HF which while less likely could rule out reversible etiology.   - Telemetry reviewed by me:  sinus/paroxsymal afib with frequently increased rates and aberrant conduction vs NSVT.   - HR poorly controlled in paroxysmal afib.  I discussed the importance of HR control in the setting of HFrEF.  The patient is refusing to increase dose of metoprolol, believing that this leads to "wheezing."  Digoxin started but having little effect on heart rate.  At this time, following discussion with electrophysiology, will increase frequently of amiodarone to 200 mg q8hr.  Will plan for likely UMA guided cardioversion following cardiac catheterization if HR cannot be adequately controlled. This will be further decided in the coming days.   - Acute biventricular failure of unknown etiology: again plan for cath tomorrow to rule out ischemic etiology.  Consider cardiac MR.  The  patient appears  euvolemic and is now with marked symptomatic improvement following diuresis.  Continue lasix at current dose.  Patient has been on losartan which should be changed to eliquis 24/26 mg bid following a 24hr washout of ARB.  Will readdress post cath.   - Echocardiogram (07/2021) reviewed: severe diffuse global hypokinesis with LVEF <20%; moderately dilated RV with severely reduced RV systolic function; severely dilated RA; moderately dilated LA mild-mod MR.  Prior echo from 02/2021 showing normal LV systolic function without hemodynamically significant valvular lesions.   - Persistent afib with elevated CHADSVASc score maintained on eliquis which is currently on hold in advance planned cath.  Risks, benefits, and alternatives discussed.   - Will continue to follow closely.       Wes Garsia M.D.

## 2021-07-28 NOTE — PROCEDURE NOTE - NSPOSTCAREGUIDE_GEN_A_CORE
Verbal/written post procedure instructions were given to patient/caregiver/Instructed patient/caregiver to follow-up with primary care physician/Instructed patient/caregiver regarding signs and symptoms of infection/Keep the cast/splint/dressing clean and dry/Care for catheter as per unit/ICU protocols
Verbal/written post procedure instructions were given to patient/caregiver/Instructed patient/caregiver to follow-up with primary care physician/Instructed patient/caregiver regarding signs and symptoms of infection/Keep the cast/splint/dressing clean and dry/Care for catheter as per unit/ICU protocols

## 2021-07-28 NOTE — PROGRESS NOTE ADULT - SUBJECTIVE AND OBJECTIVE BOX
[facility] Division of Hospital Medicine    Chief Complaint:  A fib with RVR     SUBJECTIVE / OVERNIGHT EVENTS:  PT in RVR this AM  Discussed medications with pt and she tells me she cannot take metoprolol 50 at once     Patient denies chest pain, SOB, abd pain, N/V, fever, chills, dysuria or any other complaints. All remainder ROS negative.     MEDICATIONS  (STANDING):  aMIOdarone    Tablet 200 milliGRAM(s) Oral daily  budesonide 160 MICROgram(s)/formoterol 4.5 MICROgram(s) Inhaler 2 Puff(s) Inhalation two times a day  digoxin     Tablet 250 MICROGram(s) Oral daily  furosemide    Tablet 40 milliGRAM(s) Oral daily  losartan 25 milliGRAM(s) Oral daily  melatonin 3 milliGRAM(s) Oral at bedtime  metoprolol tartrate 25 milliGRAM(s) Oral every 8 hours    MEDICATIONS  (PRN):  ALPRAZolam 0.5 milliGRAM(s) Oral every 8 hours PRN anxiety  levalbuterol Inhalation 0.63 milliGRAM(s) Inhalation every 6 hours PRN Shortness of breath or weezing        I&O's Summary    27 Jul 2021 07:01  -  28 Jul 2021 07:00  --------------------------------------------------------  IN: 116.9 mL / OUT: 0 mL / NET: 116.9 mL        PHYSICAL EXAM:  Vital Signs Last 24 Hrs  T(C): 36.8 (28 Jul 2021 08:24), Max: 36.8 (28 Jul 2021 08:24)  T(F): 98.3 (28 Jul 2021 08:24), Max: 98.3 (28 Jul 2021 08:24)  HR: 122 (28 Jul 2021 13:30) (60 - 130)  BP: 120/77 (28 Jul 2021 13:30) (110/73 - 142/88)  BP(mean): --  RR: 18 (28 Jul 2021 08:24) (18 - 20)  SpO2: 94% (28 Jul 2021 08:24) (94% - 98%)        CONSTITUTIONAL: NAD, lying in bed   ENMT: Moist oral mucosa  RESPIRATORY: Normal respiratory effort; lungs are clear to auscultation bilaterally  CARDIOVASCULAR: Irregular, S1 and S2, Trace lower extremity edema  ABDOMEN: Nontender to palpation, normoactive bowel sounds  MUSCULOSKELETAL:  No clubbing or cyanosis of digits  PSYCH: A+O to person, place, and time; affect appropriate  NEUROLOGY: CN 2-12 are intact and symmetric; no gross sensory or motor deficits   SKIN: No rashes    LABS:                        14.1   12.05 )-----------( 214      ( 28 Jul 2021 05:26 )             42.7     07-28    142  |  107  |  10.9  ----------------------------<  108<H>  4.4   |  26.0  |  0.72    Ca    8.2<L>      28 Jul 2021 05:26  Mg     2.0     07-28    TPro  5.6<L>  /  Alb  3.2<L>  /  TBili  1.0  /  DBili  x   /  AST  31  /  ALT  98<H>  /  AlkPhos  95  07-28      CARDIAC MARKERS ( 26 Jul 2021 21:29 )  x     / <0.01 ng/mL / x     / x     / x

## 2021-07-29 ENCOUNTER — TRANSCRIPTION ENCOUNTER (OUTPATIENT)
Age: 71
End: 2021-07-29

## 2021-07-29 LAB
ALBUMIN SERPL ELPH-MCNC: 3.5 G/DL — SIGNIFICANT CHANGE UP (ref 3.3–5.2)
ALP SERPL-CCNC: 97 U/L — SIGNIFICANT CHANGE UP (ref 40–120)
ALT FLD-CCNC: 87 U/L — HIGH
ANION GAP SERPL CALC-SCNC: 12 MMOL/L — SIGNIFICANT CHANGE UP (ref 5–17)
AST SERPL-CCNC: 30 U/L — SIGNIFICANT CHANGE UP
BASOPHILS # BLD AUTO: 0.05 K/UL — SIGNIFICANT CHANGE UP (ref 0–0.2)
BASOPHILS NFR BLD AUTO: 0.5 % — SIGNIFICANT CHANGE UP (ref 0–2)
BILIRUB DIRECT SERPL-MCNC: 0.2 MG/DL — SIGNIFICANT CHANGE UP (ref 0–0.3)
BILIRUB INDIRECT FLD-MCNC: 0.7 MG/DL — SIGNIFICANT CHANGE UP (ref 0.2–1)
BILIRUB SERPL-MCNC: 1 MG/DL — SIGNIFICANT CHANGE UP (ref 0.4–2)
BUN SERPL-MCNC: 10.2 MG/DL — SIGNIFICANT CHANGE UP (ref 8–20)
CALCIUM SERPL-MCNC: 8.7 MG/DL — SIGNIFICANT CHANGE UP (ref 8.6–10.2)
CHLORIDE SERPL-SCNC: 104 MMOL/L — SIGNIFICANT CHANGE UP (ref 98–107)
CO2 SERPL-SCNC: 25 MMOL/L — SIGNIFICANT CHANGE UP (ref 22–29)
CREAT SERPL-MCNC: 0.57 MG/DL — SIGNIFICANT CHANGE UP (ref 0.5–1.3)
EOSINOPHIL # BLD AUTO: 0.65 K/UL — HIGH (ref 0–0.5)
EOSINOPHIL NFR BLD AUTO: 7 % — HIGH (ref 0–6)
GLUCOSE SERPL-MCNC: 97 MG/DL — SIGNIFICANT CHANGE UP (ref 70–99)
HCT VFR BLD CALC: 46 % — HIGH (ref 34.5–45)
HGB BLD-MCNC: 14.9 G/DL — SIGNIFICANT CHANGE UP (ref 11.5–15.5)
IMM GRANULOCYTES NFR BLD AUTO: 0.4 % — SIGNIFICANT CHANGE UP (ref 0–1.5)
LYMPHOCYTES # BLD AUTO: 1.1 K/UL — SIGNIFICANT CHANGE UP (ref 1–3.3)
LYMPHOCYTES # BLD AUTO: 11.8 % — LOW (ref 13–44)
MCHC RBC-ENTMCNC: 31.2 PG — SIGNIFICANT CHANGE UP (ref 27–34)
MCHC RBC-ENTMCNC: 32.4 GM/DL — SIGNIFICANT CHANGE UP (ref 32–36)
MCV RBC AUTO: 96.2 FL — SIGNIFICANT CHANGE UP (ref 80–100)
MONOCYTES # BLD AUTO: 1.29 K/UL — HIGH (ref 0–0.9)
MONOCYTES NFR BLD AUTO: 13.8 % — SIGNIFICANT CHANGE UP (ref 2–14)
NEUTROPHILS # BLD AUTO: 6.21 K/UL — SIGNIFICANT CHANGE UP (ref 1.8–7.4)
NEUTROPHILS NFR BLD AUTO: 66.5 % — SIGNIFICANT CHANGE UP (ref 43–77)
PLATELET # BLD AUTO: 244 K/UL — SIGNIFICANT CHANGE UP (ref 150–400)
POTASSIUM SERPL-MCNC: 4 MMOL/L — SIGNIFICANT CHANGE UP (ref 3.5–5.3)
POTASSIUM SERPL-SCNC: 4 MMOL/L — SIGNIFICANT CHANGE UP (ref 3.5–5.3)
PROT SERPL-MCNC: 6.2 G/DL — LOW (ref 6.6–8.7)
RBC # BLD: 4.78 M/UL — SIGNIFICANT CHANGE UP (ref 3.8–5.2)
RBC # FLD: 13.5 % — SIGNIFICANT CHANGE UP (ref 10.3–14.5)
SODIUM SERPL-SCNC: 141 MMOL/L — SIGNIFICANT CHANGE UP (ref 135–145)
WBC # BLD: 9.34 K/UL — SIGNIFICANT CHANGE UP (ref 3.8–10.5)
WBC # FLD AUTO: 9.34 K/UL — SIGNIFICANT CHANGE UP (ref 3.8–10.5)

## 2021-07-29 PROCEDURE — 93010 ELECTROCARDIOGRAM REPORT: CPT

## 2021-07-29 PROCEDURE — 99233 SBSQ HOSP IP/OBS HIGH 50: CPT

## 2021-07-29 RX ORDER — APIXABAN 2.5 MG/1
5 TABLET, FILM COATED ORAL
Refills: 0 | Status: DISCONTINUED | OUTPATIENT
Start: 2021-07-29 | End: 2021-07-30

## 2021-07-29 RX ADMIN — LOSARTAN POTASSIUM 25 MILLIGRAM(S): 100 TABLET, FILM COATED ORAL at 05:16

## 2021-07-29 RX ADMIN — AMIODARONE HYDROCHLORIDE 200 MILLIGRAM(S): 400 TABLET ORAL at 05:17

## 2021-07-29 RX ADMIN — Medication 25 MILLIGRAM(S): at 18:06

## 2021-07-29 RX ADMIN — Medication 25 MILLIGRAM(S): at 05:16

## 2021-07-29 RX ADMIN — LEVALBUTEROL 0.63 MILLIGRAM(S): 1.25 SOLUTION, CONCENTRATE RESPIRATORY (INHALATION) at 20:05

## 2021-07-29 RX ADMIN — Medication 40 MILLIGRAM(S): at 05:17

## 2021-07-29 RX ADMIN — APIXABAN 5 MILLIGRAM(S): 2.5 TABLET, FILM COATED ORAL at 18:06

## 2021-07-29 RX ADMIN — AMIODARONE HYDROCHLORIDE 200 MILLIGRAM(S): 400 TABLET ORAL at 21:17

## 2021-07-29 RX ADMIN — BUDESONIDE AND FORMOTEROL FUMARATE DIHYDRATE 2 PUFF(S): 160; 4.5 AEROSOL RESPIRATORY (INHALATION) at 21:17

## 2021-07-29 RX ADMIN — AMIODARONE HYDROCHLORIDE 200 MILLIGRAM(S): 400 TABLET ORAL at 15:07

## 2021-07-29 RX ADMIN — Medication 3 MILLIGRAM(S): at 21:17

## 2021-07-29 RX ADMIN — Medication 0.5 MILLIGRAM(S): at 21:17

## 2021-07-29 RX ADMIN — Medication 25 MILLIGRAM(S): at 21:17

## 2021-07-29 RX ADMIN — Medication 250 MICROGRAM(S): at 05:17

## 2021-07-29 NOTE — PROGRESS NOTE ADULT - SUBJECTIVE AND OBJECTIVE BOX
Department of Cardiology                                                                  [facility]/[place] [address]                                                            Telephone: [phone]. Fax:[phone]                                                                                     Pre-UMA Note        Narrative:  69 yo female admitted with Afib/flutter Heart failure with EF of 20 % which is new for her.         	  MEDICATIONS:  aMIOdarone    Tablet 200 milliGRAM(s) Oral every 8 hours  digoxin     Tablet 250 MICROGram(s) Oral daily  furosemide    Tablet 40 milliGRAM(s) Oral daily  losartan 25 milliGRAM(s) Oral daily  metoprolol tartrate 25 milliGRAM(s) Oral every 8 hours  budesonide 160 MICROgram(s)/formoterol 4.5 MICROgram(s) Inhaler 2 Puff(s) Inhalation two times a day  levalbuterol Inhalation 0.63 milliGRAM(s) Inhalation every 6 hours PRN  ALPRAZolam 0.5 milliGRAM(s) Oral every 8 hours PRN  melatonin 3 milliGRAM(s) Oral at bedtime        PHYSICAL EXAM:    T(C): 36.4 (07-29-21 @ 06:49), Max: 37.3 (07-28-21 @ 17:06)  HR: 112 (07-29-21 @ 06:49) (83 - 130)  BP: 108/72 (07-29-21 @ 06:49) (108/72 - 128/84)  RR: 16 (07-29-21 @ 06:49) (16 - 18)  SpO2: 94% (07-29-21 @ 06:49) (94% - 97%)    Constitutional: A & O x 3  HEENT:   Normal oral mucosa, PERRL, EOMI	  Cardiovascular: Normal S1 S2, No JVD, No murmurs, No edema  Respiratory: Lungs clear to auscultation	  Gastrointestinal:  Soft, Non-tender, + BS	  Skin: No rashes, No ecchymoses, No cyanosis  Neurologic: Non-focal  Extremities: Normal range of motion, No clubbing, cyanosis or edema  Vascular: Peripheral pulses palpable 2+ bilaterally    TELEMETRY: Afib aflutter 109-118 	       	    CARDIAC MARKERS:                        14.1   12.05 )-----------( 214      ( 28 Jul 2021 05:26 )             42.7     07-28    142  |  107  |  10.9  ----------------------------<  108<H>  4.4   |  26.0  |  0.72    Ca    8.2<L>      28 Jul 2021 05:26  Mg     2.0     07-28    TPro  5.6<L>  /  Alb  3.2<L>  /  TBili  1.0  /  DBili  x   /  AST  31  /  ALT  98<H>  /  AlkPhos  95  07-28      ASSESSMENT: 69 yo female admitted with Afib/flutter Heart failure with EF of 20 % which is new for her.     - Pre Cath orders   -Procedure discussed with patient; risks and benefits explained; questions answered  -Labs and ECG reviewed                                                                         Department of Cardiology                                                                  [facility] [address]                                                            Telephone: [phone]. Fax:[phone]                                                                                     NP NOTE         Narrative:  71 yo female admitted with Afib/flutter Heart failure with EF of 20 % which is new for her.         	  MEDICATIONS:  aMIOdarone    Tablet 200 milliGRAM(s) Oral every 8 hours  digoxin     Tablet 250 MICROGram(s) Oral daily  furosemide    Tablet 40 milliGRAM(s) Oral daily  losartan 25 milliGRAM(s) Oral daily  metoprolol tartrate 25 milliGRAM(s) Oral every 8 hours  budesonide 160 MICROgram(s)/formoterol 4.5 MICROgram(s) Inhaler 2 Puff(s) Inhalation two times a day  levalbuterol Inhalation 0.63 milliGRAM(s) Inhalation every 6 hours PRN  ALPRAZolam 0.5 milliGRAM(s) Oral every 8 hours PRN  melatonin 3 milliGRAM(s) Oral at bedtime        PHYSICAL EXAM:    T(C): 36.4 (07-29-21 @ 06:49), Max: 37.3 (07-28-21 @ 17:06)  HR: 112 (07-29-21 @ 06:49) (83 - 130)  BP: 108/72 (07-29-21 @ 06:49) (108/72 - 128/84)  RR: 16 (07-29-21 @ 06:49) (16 - 18)  SpO2: 94% (07-29-21 @ 06:49) (94% - 97%)    Constitutional: A & O x 3  HEENT:   Normal oral mucosa, PERRL, EOMI	  Cardiovascular: Normal S1 S2, No JVD, No murmurs, No edema  Respiratory: Lungs clear to auscultation	  Gastrointestinal:  Soft, Non-tender, + BS	  Skin: No rashes, No ecchymoses, No cyanosis  Neurologic: Non-focal  Extremities: Normal range of motion, No clubbing, cyanosis or edema  Vascular: Peripheral pulses palpable 2+ bilaterally    TELEMETRY: Afib aflutter 109-118 	       	    CARDIAC MARKERS:                        14.1   12.05 )-----------( 214      ( 28 Jul 2021 05:26 )             42.7     07-28    142  |  107  |  10.9  ----------------------------<  108<H>  4.4   |  26.0  |  0.72    Ca    8.2<L>      28 Jul 2021 05:26  Mg     2.0     07-28    TPro  5.6<L>  /  Alb  3.2<L>  /  TBili  1.0  /  DBili  x   /  AST  31  /  ALT  98<H>  /  AlkPhos  95  07-28      ASSESSMENT: 71 yo female admitted with Afib/flutter Heart failure with EF of 20 % which is new for her.     - Pre Cath orders   -Procedure discussed with patient; risks and benefits explained; questions answered  -Labs and ECG reviewed

## 2021-07-29 NOTE — PROGRESS NOTE ADULT - ASSESSMENT
70y woman with known paroxsymal afib (dx 2020) maintained on eliquis with reported intolerance to flecainide, admitted following several days of progressive shortness of breath with recent bout of gastroenteritis. In ED, found to have acute decompensated biventricular heart failure and of rapid atrial fibrillation.      Afib with RVR  - Still uncontrolled  - Now off amiodarone drip  - hold eliquis for eventual LHC  - Now on Digoxin, Metoprolol  - Amiodarone dose increased today   - Monitor and replace lytes     New CM  - EF <20%,   - Plan for cardiac cath tomorrow   - C/w BB, atarted on ARB    Acute Hypoxic resp failure  - CTA: No evidence of pulmonary embolism. Bilateral layering pleural effusions. Minimal left upper lobe groundglass opacity, likely postinflammatory.  - Supplemental oxygen as needed     Transaminitis  - possibly due to hepatic congestion  - negative hepatitis panel  - Trend     Asthma  - not in acute exacerbation  - symbicort (advair not on formulary)   - change albuterol to xopenex due to elevated heart rates    Anxiety/Insomnia  - Xanax as needed  - Melatonin   70y woman with known paroxsymal afib (dx 2020) maintained on eliquis with reported intolerance to flecainide, admitted following several days of progressive shortness of breath with recent bout of gastroenteritis. In ED, found to have acute decompensated biventricular heart failure and of rapid atrial fibrillation.      Afib with RVR  - Now off amiodarone drip  - Now on Digoxin, Metoprolol, Amiodarone   - s/p TTE/CV today  - Restart Eliquis   - Monitor and replace lytes     New CM  - EF <20% on initial TTE   - s/p LHC today: normal coronaries and normal RHC pressures, EF 45%   - C/w BB, ARB    Acute Hypoxic resp failure  - CTA: No evidence of pulmonary embolism. Bilateral layering pleural effusions. Minimal left upper lobe groundglass opacity, likely postinflammatory.  - Supplemental oxygen as needed     Transaminitis  - possibly due to hepatic congestion  - negative hepatitis panel  - Improving     Asthma  - not in acute exacerbation  - symbicort (advair not on formulary)   - changed albuterol to xopenex due to elevated heart rates    Anxiety/Insomnia  - Xanax as needed  - Melatonin    Discussed with pt and family at bedside  Dispo: Likely DC in AM

## 2021-07-29 NOTE — PROVIDER CONTACT NOTE (OTHER) - SITUATION
Patient is sleeping, no acute distress.  cardiac monitor keeps alarming V-Tach, HR ranging from 100s to 130s.

## 2021-07-29 NOTE — DISCHARGE NOTE PROVIDER - PROVIDER TOKENS
PROVIDER:[TOKEN:[[id number]]] PROVIDER:[TOKEN:[[id number]]],PROVIDER:[TOKEN:[[id number]]],FREE:[LAST:[Primary Care Doctor],PHONE:[(   )    -],FAX:[(   )    -]]

## 2021-07-29 NOTE — PROGRESS NOTE ADULT - SUBJECTIVE AND OBJECTIVE BOX
Department of Cardiology                                                                  [facility]/[place] [address]                                                            Telephone: [phone]. Fax:[phone]                                                    Post- Procedure Note: Left Heart Cardiac Catheterization       Narrative:  70y  Female  with history of afib for about 1 year on eliquis, admitted through ER for Heart failure post recent gastroenteritis exacerbation .   Post LHC found to have normal Cors and normal RHC pressures EF was 45%.    She then   Underwent a UMA with CV successfully done we restarted her eliquis and will plan for DC in am.          PAST MEDICAL & SURGICAL HISTORY:  Asthma    Hepatitis B    Palpitations  SVT, short runs VT on Holter    Atrial fibrillation    S/P knee surgery  R    S/P tubal ligation    S/P cataract extraction and insertion of intraocular lens    History of total right knee replacement  11/2018      Home Medications:  Advair Diskus 250 mcg-50 mcg inhalation powder: 1 puff(s) inhaled once a day (16 Jan 2019 05:59)  albuterol 90 mcg/inh inhalation aerosol: 2 puff(s) inhaled 4 times a day, As Needed (16 Jan 2019 05:59)  Eliquis 5 mg oral tablet: 1 tab(s) orally 2 times a day (26 Jul 2021 11:10)  Lopressor 50 mg oral tablet: 1 tab(s) orally 2 times a day (26 Jul 2021 11:09)    carisoprodol (Hives)      Objective:  Vital Signs Last 24 Hrs  T(C): 36.4 (29 Jul 2021 06:49), Max: 37.3 (28 Jul 2021 17:06)  T(F): 97.6 (29 Jul 2021 06:49), Max: 99.1 (28 Jul 2021 17:06)  HR: 102 (29 Jul 2021 09:30) (90 - 122)  BP: 149/69 (29 Jul 2021 09:30) (108/72 - 156/67)  BP(mean): --  RR: 15 (29 Jul 2021 09:30) (15 - 18)  SpO2: 95% (29 Jul 2021 09:30) (93% - 97%)    ROS: as stated above, otherwise negative    PHYSICAL EXAM:  Constitutional: awakens easily A & O x 3, NAD  HEENT:  Normal oral mucosa, PERRL, EOMI	  Cardiovascular: S1 S2, No murmurs, No JVD  Respiratory: Lungs clear to auscultation	  Gastrointestinal:  Soft, Non-tender, + BS	  Skin: No rashes or cyanosis  Neurologic: No deficit appreciated  Extremities: Normal range of motion, no edema  Vascular: Access site stable, no bleed or hematoma, distal pulses +     Labs:                           14.9   9.34  )-----------( 244      ( 29 Jul 2021 07:27 )             46.0     07-29    141  |  104  |  10.2  ----------------------------<  97  4.0   |  25.0  |  0.57    Ca    8.7      29 Jul 2021 07:27  Mg     2.0     07-28    TPro  6.2<L>  /  Alb  3.5  /  TBili  1.0  /  DBili  0.2  /  AST  30  /  ALT  87<H>  /  AlkPhos  97  07-29          Assessment:    70y  Female  with history of afib for about 1 year on eliquis, admitted through ER for Heart failure post recent gastroenteritis exacerbation .   Post LHC found to have normal Cors and normal RHC pressures EF was 45%.    She then   Underwent a UMA with CVV successfully done we restarted her eliquis and will plan for DC in am.      Plan:  -Formal cath report pending  -Post procedure management/monitoring per protocol  -Access site precautions  -Bedrest x 4 hours post procedure  -EKG post UMA completed now RSR with RBBB   -Labs and EKG in am  -Repeat ECG if any clinical indication or change on tele  -Continue current medical therapy  - will continue BB and Amiodarone   - will follow up with Dr Adams in 1 week.   -Educated regarding post procedure management and care     Handoff

## 2021-07-29 NOTE — DISCHARGE NOTE PROVIDER - CARE PROVIDER_API CALL
Sheron Rangel)  Cardiovascular Disease; Internal Medicine  [address], [address]  [place] [address]  Phone: [phone]  Fax: [phone]  Follow Up Time:    Sheron Rangel)  Cardiovascular Disease; Internal Medicine  [address], [address]  [place] [address]  Phone: [phone]  Fax: [phone]  Follow Up Time:     Edward Adams)  Cardiac Electrophysiology; Cardiovascular Disease  [address], [address]  [place] [address]  Phone: [phone]  Fax: [phone]  Follow Up Time:     Primary Care Doctor,   Phone: (   )    -  Fax: (   )    -  Follow Up Time:

## 2021-07-29 NOTE — DISCHARGE NOTE PROVIDER - HOSPITAL COURSE
70y woman with known paroxsymal afib (dx 2020) maintained on eliquis with reported intolerance to flecainide, admitted following several days of progressive shortness of breath with recent bout of gastroenteritis. In ED, found to have acute decompensated biventricular heart failure and of rapid atrial fibrillation. She had a  CTA chest due to hypoxia: No evidence of pulmonary embolism. Bilateral layering pleural effusions. Minimal left upper lobe groundglass opacity, likely postinflammatory.Pt had uncontrolled Atrial fibrillation so was started on Amiodarone gtt and also Digoxin and Metoprolol. Her ECHO done showed EF <20% so she was started on ARB and a cardiac cath was performed on 7/29/21 showing normal coronaries and normal RHC pressures, EF 45%. She also underwent TTE/CV.        70y woman with known paroxsymal afib (dx 2020) maintained on eliquis with reported intolerance to flecainide, admitted following several days of progressive shortness of breath with recent bout of gastroenteritis. In ED, found to have acute decompensated biventricular heart failure and of rapid atrial fibrillation. She had a  CTA chest due to hypoxia: No evidence of pulmonary embolism. Bilateral layering pleural effusions. Minimal left upper lobe groundglass opacity, likely postinflammatory.Pt had uncontrolled Atrial fibrillation so was started on Amiodarone gtt and also Digoxin and Metoprolol. Her ECHO done showed EF <20% so she was started on ARB and a cardiac cath was performed on 7/29/21 showing normal coronaries and normal RHC pressures, EF 45%. She also underwent TTE/CV.     Time spent on patients discharge 32 minutes

## 2021-07-29 NOTE — DISCHARGE NOTE PROVIDER - NSDCMRMEDTOKEN_GEN_ALL_CORE_FT
Advair Diskus 250 mcg-50 mcg inhalation powder: 1 puff(s) inhaled once a day  albuterol 90 mcg/inh inhalation aerosol: 2 puff(s) inhaled 4 times a day, As Needed  Eliquis 5 mg oral tablet: 1 tab(s) orally 2 times a day  Lopressor 50 mg oral tablet: 1 tab(s) orally 2 times a day   Advair Diskus 250 mcg-50 mcg inhalation powder: 1 puff(s) inhaled once a day  albuterol 90 mcg/inh inhalation aerosol: 2 puff(s) inhaled 4 times a day, As Needed  amiodarone 200 mg oral tablet: 1 tab(s) orally every 8 hours  digoxin 250 mcg (0.25 mg) oral tablet: 1 tab(s) orally once a day  Eliquis 5 mg oral tablet: 1 tab(s) orally 2 times a day  furosemide 40 mg oral tablet: 1 tab(s) orally once a day  losartan 25 mg oral tablet: 1 tab(s) orally once a day  Metoprolol Tartrate 25 mg oral tablet: 1 tab(s) orally every 8 hours  verapamil 40 mg oral tablet: 1 tab(s) orally 2 times a day

## 2021-07-29 NOTE — PROCEDURE NOTE - ADDITIONAL PROCEDURE DETAILS
4FR 20CM 27CIRC BARD POWER MIDLINE good heme return ns flush left basilic vein
Pre-procedure UMA revealed no BHARAT thrombi therefore DCCV performed with successful restoration of normal sinus rhythm

## 2021-07-29 NOTE — PROGRESS NOTE ADULT - SUBJECTIVE AND OBJECTIVE BOX
[facility] Division of Hospital Medicine    Chief Complaint:  A fib with RVR     SUBJECTIVE / OVERNIGHT EVENTS:    Patient denies chest pain, SOB, abd pain, N/V, fever, chills, dysuria or any other complaints. All remainder ROS negative.     MEDICATIONS  (STANDING):  aMIOdarone    Tablet 200 milliGRAM(s) Oral every 8 hours  apixaban 5 milliGRAM(s) Oral two times a day  budesonide 160 MICROgram(s)/formoterol 4.5 MICROgram(s) Inhaler 2 Puff(s) Inhalation two times a day  digoxin     Tablet 250 MICROGram(s) Oral daily  furosemide    Tablet 40 milliGRAM(s) Oral daily  losartan 25 milliGRAM(s) Oral daily  melatonin 3 milliGRAM(s) Oral at bedtime  metoprolol tartrate 25 milliGRAM(s) Oral every 8 hours    MEDICATIONS  (PRN):  ALPRAZolam 0.5 milliGRAM(s) Oral every 8 hours PRN anxiety  levalbuterol Inhalation 0.63 milliGRAM(s) Inhalation every 6 hours PRN Shortness of breath or weezing        I&O's Summary      PHYSICAL EXAM:  Vital Signs Last 24 Hrs  T(C): 36.4 (29 Jul 2021 11:45), Max: 37.3 (28 Jul 2021 17:06)  T(F): 97.5 (29 Jul 2021 11:45), Max: 99.1 (28 Jul 2021 17:06)  HR: 62 (29 Jul 2021 11:45) (62 - 116)  BP: 120/71 (29 Jul 2021 11:45) (108/72 - 156/67)  BP(mean): --  RR: 18 (29 Jul 2021 11:45) (15 - 18)  SpO2: 98% (29 Jul 2021 11:45) (93% - 98%)          LABS:                        14.9   9.34  )-----------( 244      ( 29 Jul 2021 07:27 )             46.0     07-29    141  |  104  |  10.2  ----------------------------<  97  4.0   |  25.0  |  0.57    Ca    8.7      29 Jul 2021 07:27  Mg     2.0     07-28    TPro  6.2<L>  /  Alb  3.5  /  TBili  1.0  /  DBili  0.2  /  AST  30  /  ALT  87<H>  /  AlkPhos  97  07-29     [facility] Division of Hospital Medicine    Chief Complaint:  A fib with RVR     SUBJECTIVE / OVERNIGHT EVENTS:  Pt had UMA/CV and cardiac cath this morning  Seen at bedside this afternoon, says she feels tired but otherwise has no complaints     Patient denies chest pain, SOB, abd pain, N/V, fever, chills, dysuria or any other complaints. All remainder ROS negative.     MEDICATIONS  (STANDING):  aMIOdarone    Tablet 200 milliGRAM(s) Oral every 8 hours  apixaban 5 milliGRAM(s) Oral two times a day  budesonide 160 MICROgram(s)/formoterol 4.5 MICROgram(s) Inhaler 2 Puff(s) Inhalation two times a day  digoxin     Tablet 250 MICROGram(s) Oral daily  furosemide    Tablet 40 milliGRAM(s) Oral daily  losartan 25 milliGRAM(s) Oral daily  melatonin 3 milliGRAM(s) Oral at bedtime  metoprolol tartrate 25 milliGRAM(s) Oral every 8 hours    MEDICATIONS  (PRN):  ALPRAZolam 0.5 milliGRAM(s) Oral every 8 hours PRN anxiety  levalbuterol Inhalation 0.63 milliGRAM(s) Inhalation every 6 hours PRN Shortness of breath or weezing        I&O's Summary      PHYSICAL EXAM:  Vital Signs Last 24 Hrs  T(C): 36.4 (29 Jul 2021 11:45), Max: 37.3 (28 Jul 2021 17:06)  T(F): 97.5 (29 Jul 2021 11:45), Max: 99.1 (28 Jul 2021 17:06)  HR: 62 (29 Jul 2021 11:45) (62 - 116)  BP: 120/71 (29 Jul 2021 11:45) (108/72 - 156/67)  BP(mean): --  RR: 18 (29 Jul 2021 11:45) (15 - 18)  SpO2: 98% (29 Jul 2021 11:45) (93% - 98%)      CONSTITUTIONAL: NAD, lying in bed   ENMT: Moist oral mucosa  RESPIRATORY: Normal respiratory effort; lungs are clear to auscultation bilaterally  CARDIOVASCULAR: Irregular, S1 and S2, Trace lower extremity edema  ABDOMEN: Nontender to palpation, normoactive bowel sounds  MUSCULOSKELETAL:  No clubbing or cyanosis of digits  PSYCH: A+O to person, place, and time; affect appropriate  NEUROLOGY: CN 2-12 are intact and symmetric; no gross sensory or motor deficits   SKIN: No rashes      LABS:                        14.9   9.34  )-----------( 244 ( 29 Jul 2021 07:27 )             46.0     07-29    141  |  104  |  10.2  ----------------------------<  97  4.0   |  25.0  |  0.57    Ca    8.7      29 Jul 2021 07:27  Mg     2.0     07-28    TPro  6.2<L>  /  Alb  3.5  /  TBili  1.0  /  DBili  0.2  /  AST  30  /  ALT  87<H>  /  AlkPhos  97  07-29

## 2021-07-29 NOTE — DISCHARGE NOTE PROVIDER - NSDCCPCAREPLAN_GEN_ALL_CORE_FT
PRINCIPAL DISCHARGE DIAGNOSIS  Diagnosis: Acute CHF  Assessment and Plan of Treatment: - EF <20% on initial TTE   - s/p LHC: normal coronaries and normal RHC pressures, EF 45%   - C/w BB, ARB        SECONDARY DISCHARGE DIAGNOSES  Diagnosis: Atrial fibrillation with rapid ventricular response  Assessment and Plan of Treatment: - on Digoxin, Metoprolol, Amiodarone   - s/p TTE/CV   - Restart Eliquis     PRINCIPAL DISCHARGE DIAGNOSIS  Diagnosis: Acute CHF  Assessment and Plan of Treatment: - s/p LHC: normal coronaries and normal RHC pressures, EF 45%   - C/w BB, ARB        SECONDARY DISCHARGE DIAGNOSES  Diagnosis: Atrial fibrillation with rapid ventricular response  Assessment and Plan of Treatment: - take medications as prescribed   - s/p TTE/CV   - follow up with cardiology Dr Appiah on monday or tuesday      Diagnosis: Thrombophlebitis  Assessment and Plan of Treatment: - warm compress to arm

## 2021-07-30 ENCOUNTER — TRANSCRIPTION ENCOUNTER (OUTPATIENT)
Age: 71
End: 2021-07-30

## 2021-07-30 VITALS
SYSTOLIC BLOOD PRESSURE: 107 MMHG | HEART RATE: 60 BPM | TEMPERATURE: 98 F | RESPIRATION RATE: 18 BRPM | OXYGEN SATURATION: 95 % | DIASTOLIC BLOOD PRESSURE: 65 MMHG

## 2021-07-30 LAB
ANION GAP SERPL CALC-SCNC: 11 MMOL/L — SIGNIFICANT CHANGE UP (ref 5–17)
BASOPHILS # BLD AUTO: 0.06 K/UL — SIGNIFICANT CHANGE UP (ref 0–0.2)
BASOPHILS NFR BLD AUTO: 0.8 % — SIGNIFICANT CHANGE UP (ref 0–2)
BUN SERPL-MCNC: 13.7 MG/DL — SIGNIFICANT CHANGE UP (ref 8–20)
CALCIUM SERPL-MCNC: 8.9 MG/DL — SIGNIFICANT CHANGE UP (ref 8.6–10.2)
CHLORIDE SERPL-SCNC: 103 MMOL/L — SIGNIFICANT CHANGE UP (ref 98–107)
CO2 SERPL-SCNC: 26 MMOL/L — SIGNIFICANT CHANGE UP (ref 22–29)
CREAT SERPL-MCNC: 0.52 MG/DL — SIGNIFICANT CHANGE UP (ref 0.5–1.3)
EOSINOPHIL # BLD AUTO: 0.78 K/UL — HIGH (ref 0–0.5)
EOSINOPHIL NFR BLD AUTO: 11 % — HIGH (ref 0–6)
GLUCOSE SERPL-MCNC: 96 MG/DL — SIGNIFICANT CHANGE UP (ref 70–99)
HCT VFR BLD CALC: 44.7 % — SIGNIFICANT CHANGE UP (ref 34.5–45)
HGB BLD-MCNC: 14.4 G/DL — SIGNIFICANT CHANGE UP (ref 11.5–15.5)
IMM GRANULOCYTES NFR BLD AUTO: 0.4 % — SIGNIFICANT CHANGE UP (ref 0–1.5)
LYMPHOCYTES # BLD AUTO: 0.96 K/UL — LOW (ref 1–3.3)
LYMPHOCYTES # BLD AUTO: 13.6 % — SIGNIFICANT CHANGE UP (ref 13–44)
MAGNESIUM SERPL-MCNC: 1.9 MG/DL — SIGNIFICANT CHANGE UP (ref 1.6–2.6)
MCHC RBC-ENTMCNC: 31.2 PG — SIGNIFICANT CHANGE UP (ref 27–34)
MCHC RBC-ENTMCNC: 32.2 GM/DL — SIGNIFICANT CHANGE UP (ref 32–36)
MCV RBC AUTO: 96.8 FL — SIGNIFICANT CHANGE UP (ref 80–100)
MONOCYTES # BLD AUTO: 0.92 K/UL — HIGH (ref 0–0.9)
MONOCYTES NFR BLD AUTO: 13 % — SIGNIFICANT CHANGE UP (ref 2–14)
NEUTROPHILS # BLD AUTO: 4.32 K/UL — SIGNIFICANT CHANGE UP (ref 1.8–7.4)
NEUTROPHILS NFR BLD AUTO: 61.2 % — SIGNIFICANT CHANGE UP (ref 43–77)
PLATELET # BLD AUTO: 228 K/UL — SIGNIFICANT CHANGE UP (ref 150–400)
POTASSIUM SERPL-MCNC: 4.3 MMOL/L — SIGNIFICANT CHANGE UP (ref 3.5–5.3)
POTASSIUM SERPL-SCNC: 4.3 MMOL/L — SIGNIFICANT CHANGE UP (ref 3.5–5.3)
RBC # BLD: 4.62 M/UL — SIGNIFICANT CHANGE UP (ref 3.8–5.2)
RBC # FLD: 13.4 % — SIGNIFICANT CHANGE UP (ref 10.3–14.5)
SODIUM SERPL-SCNC: 140 MMOL/L — SIGNIFICANT CHANGE UP (ref 135–145)
WBC # BLD: 7.07 K/UL — SIGNIFICANT CHANGE UP (ref 3.8–10.5)
WBC # FLD AUTO: 7.07 K/UL — SIGNIFICANT CHANGE UP (ref 3.8–10.5)

## 2021-07-30 PROCEDURE — 71046 X-RAY EXAM CHEST 2 VIEWS: CPT

## 2021-07-30 PROCEDURE — U0005: CPT

## 2021-07-30 PROCEDURE — 80053 COMPREHEN METABOLIC PANEL: CPT

## 2021-07-30 PROCEDURE — 85027 COMPLETE CBC AUTOMATED: CPT

## 2021-07-30 PROCEDURE — 85025 COMPLETE CBC W/AUTO DIFF WBC: CPT

## 2021-07-30 PROCEDURE — 83735 ASSAY OF MAGNESIUM: CPT

## 2021-07-30 PROCEDURE — 36415 COLL VENOUS BLD VENIPUNCTURE: CPT

## 2021-07-30 PROCEDURE — 99152 MOD SED SAME PHYS/QHP 5/>YRS: CPT

## 2021-07-30 PROCEDURE — C1887: CPT

## 2021-07-30 PROCEDURE — 93460 R&L HRT ART/VENTRICLE ANGIO: CPT

## 2021-07-30 PROCEDURE — 93325 DOPPLER ECHO COLOR FLOW MAPG: CPT

## 2021-07-30 PROCEDURE — 93320 DOPPLER ECHO COMPLETE: CPT

## 2021-07-30 PROCEDURE — U0003: CPT

## 2021-07-30 PROCEDURE — 93312 ECHO TRANSESOPHAGEAL: CPT

## 2021-07-30 PROCEDURE — C1769: CPT

## 2021-07-30 PROCEDURE — C1894: CPT

## 2021-07-30 PROCEDURE — C8929: CPT

## 2021-07-30 PROCEDURE — 82803 BLOOD GASES ANY COMBINATION: CPT

## 2021-07-30 PROCEDURE — 71275 CT ANGIOGRAPHY CHEST: CPT

## 2021-07-30 PROCEDURE — 86769 SARS-COV-2 COVID-19 ANTIBODY: CPT

## 2021-07-30 PROCEDURE — 93971 EXTREMITY STUDY: CPT

## 2021-07-30 PROCEDURE — 80076 HEPATIC FUNCTION PANEL: CPT

## 2021-07-30 PROCEDURE — 84443 ASSAY THYROID STIM HORMONE: CPT

## 2021-07-30 PROCEDURE — 80048 BASIC METABOLIC PNL TOTAL CA: CPT

## 2021-07-30 PROCEDURE — 99239 HOSP IP/OBS DSCHRG MGMT >30: CPT

## 2021-07-30 PROCEDURE — 93005 ELECTROCARDIOGRAM TRACING: CPT

## 2021-07-30 PROCEDURE — 99285 EMERGENCY DEPT VISIT HI MDM: CPT

## 2021-07-30 PROCEDURE — 83880 ASSAY OF NATRIURETIC PEPTIDE: CPT

## 2021-07-30 PROCEDURE — C1889: CPT

## 2021-07-30 PROCEDURE — 80162 ASSAY OF DIGOXIN TOTAL: CPT

## 2021-07-30 PROCEDURE — 94640 AIRWAY INHALATION TREATMENT: CPT

## 2021-07-30 PROCEDURE — 94760 N-INVAS EAR/PLS OXIMETRY 1: CPT

## 2021-07-30 PROCEDURE — 84484 ASSAY OF TROPONIN QUANT: CPT

## 2021-07-30 PROCEDURE — 71045 X-RAY EXAM CHEST 1 VIEW: CPT

## 2021-07-30 PROCEDURE — 80074 ACUTE HEPATITIS PANEL: CPT

## 2021-07-30 RX ORDER — METOPROLOL TARTRATE 50 MG
1 TABLET ORAL
Qty: 90 | Refills: 0
Start: 2021-07-30 | End: 2021-08-28

## 2021-07-30 RX ORDER — DIGOXIN 250 MCG
1 TABLET ORAL
Qty: 30 | Refills: 0
Start: 2021-07-30 | End: 2021-08-28

## 2021-07-30 RX ORDER — FUROSEMIDE 40 MG
1 TABLET ORAL
Qty: 30 | Refills: 0
Start: 2021-07-30 | End: 2021-08-28

## 2021-07-30 RX ORDER — VERAPAMIL HCL 240 MG
1 CAPSULE, EXTENDED RELEASE PELLETS 24 HR ORAL
Qty: 60 | Refills: 0
Start: 2021-07-30 | End: 2021-08-28

## 2021-07-30 RX ORDER — METOPROLOL TARTRATE 50 MG
1 TABLET ORAL
Qty: 0 | Refills: 0 | DISCHARGE

## 2021-07-30 RX ORDER — AMIODARONE HYDROCHLORIDE 400 MG/1
1 TABLET ORAL
Qty: 90 | Refills: 0
Start: 2021-07-30 | End: 2021-08-28

## 2021-07-30 RX ORDER — VERAPAMIL HCL 240 MG
40 CAPSULE, EXTENDED RELEASE PELLETS 24 HR ORAL
Refills: 0 | Status: DISCONTINUED | OUTPATIENT
Start: 2021-07-30 | End: 2021-07-30

## 2021-07-30 RX ORDER — LOSARTAN POTASSIUM 100 MG/1
1 TABLET, FILM COATED ORAL
Qty: 30 | Refills: 0
Start: 2021-07-30 | End: 2021-08-28

## 2021-07-30 RX ADMIN — LOSARTAN POTASSIUM 25 MILLIGRAM(S): 100 TABLET, FILM COATED ORAL at 05:14

## 2021-07-30 RX ADMIN — Medication 40 MILLIGRAM(S): at 05:14

## 2021-07-30 RX ADMIN — LEVALBUTEROL 0.63 MILLIGRAM(S): 1.25 SOLUTION, CONCENTRATE RESPIRATORY (INHALATION) at 10:08

## 2021-07-30 RX ADMIN — Medication 40 MILLIGRAM(S): at 11:09

## 2021-07-30 RX ADMIN — LEVALBUTEROL 0.63 MILLIGRAM(S): 1.25 SOLUTION, CONCENTRATE RESPIRATORY (INHALATION) at 03:24

## 2021-07-30 RX ADMIN — Medication 25 MILLIGRAM(S): at 05:14

## 2021-07-30 RX ADMIN — AMIODARONE HYDROCHLORIDE 200 MILLIGRAM(S): 400 TABLET ORAL at 05:14

## 2021-07-30 RX ADMIN — APIXABAN 5 MILLIGRAM(S): 2.5 TABLET, FILM COATED ORAL at 05:14

## 2021-07-30 RX ADMIN — Medication 250 MICROGRAM(S): at 05:14

## 2021-07-30 NOTE — PROGRESS NOTE ADULT - SUBJECTIVE AND OBJECTIVE BOX
Patient is a 70y old  Female who presents with a chief complaint of palpitations (30 Jul 2021 07:58)    Patient seen and examined at bedside.     ALLERGIES:  carisoprodol (Hives)    MEDICATIONS  (STANDING):  aMIOdarone    Tablet 200 milliGRAM(s) Oral every 8 hours  apixaban 5 milliGRAM(s) Oral two times a day  budesonide 160 MICROgram(s)/formoterol 4.5 MICROgram(s) Inhaler 2 Puff(s) Inhalation two times a day  digoxin     Tablet 250 MICROGram(s) Oral daily  furosemide    Tablet 40 milliGRAM(s) Oral daily  losartan 25 milliGRAM(s) Oral daily  melatonin 3 milliGRAM(s) Oral at bedtime  metoprolol tartrate 25 milliGRAM(s) Oral every 8 hours  verapamil 40 milliGRAM(s) Oral two times a day    MEDICATIONS  (PRN):  ALPRAZolam 0.5 milliGRAM(s) Oral every 8 hours PRN anxiety  levalbuterol Inhalation 0.63 milliGRAM(s) Inhalation every 6 hours PRN Shortness of breath or weezing    Vital Signs Last 24 Hrs  T(F): 98.4 (30 Jul 2021 05:25), Max: 98.4 (30 Jul 2021 05:25)  HR: 69 (30 Jul 2021 05:25) (62 - 112)  BP: 106/68 (30 Jul 2021 05:25) (102/64 - 149/69)  RR: 18 (30 Jul 2021 05:25) (15 - 18)  SpO2: 100% (30 Jul 2021 05:25) (94% - 100%)  I&O's Summary    PHYSICAL EXAM:  General: NAD, A/O x 3  ENT: MMM, no thrush  Neck: Supple, No JVD  Lungs: good air entry, non-labored breathing  Cardio: +s1/s2  Abdomen: Soft, Nontender, Nondistended; Bowel sounds present  Extremities: No calf tenderness, No pitting edema    LABS:                        14.4   7.07  )-----------( 228      ( 30 Jul 2021 07:47 )             44.7     07-30    140  |  103  |  13.7  ----------------------------<  96  4.3   |  26.0  |  0.52    Ca    8.9      30 Jul 2021 07:47  Mg     1.9     07-30    TPro  6.2  /  Alb  3.5  /  TBili  1.0  /  DBili  0.2  /  AST  30  /  ALT  87  /  AlkPhos  97  07-29    eGFR if Non African American: 98 mL/min/1.73M2 (07-30-21 @ 07:47)  eGFR if : 114 mL/min/1.73M2 (07-30-21 @ 07:47)    COVID-19 PCR: NotDetec (07-26-21 @ 07:55)    RADIOLOGY & ADDITIONAL TESTS:  < from: US Duplex Venous Upper Ext Ltd, Right (07.27.21 @ 17:50) >  IMPRESSION:  No evidence of right upper extremity deep venous thrombosis.  Superficial thrombophlebitis involving the cephalic and basilic veins.  < end of copied text >    < from: CT Angio Chest PE Protocol w/ IV Cont (07.27.21 @ 11:24) >  IMPRESSION: No evidence of pulmonary embolism. Bilateral layering pleural effusions. Minimal left upper lobe groundglass opacity, likely postinflammatory.  < end of copied text >    < from: Xray Chest 1 View- PORTABLE-Urgent (Xray Chest 1 View- PORTABLE-Urgent .) (07.27.21 @ 04:04) >  IMPRESSION:  Bilateral pleural effusions/adjacent atelectasis/consolidation, unchanged on the left and new on the right  < end of copied text >    Care Discussed with Consultants/Other Providers:   Cardiology

## 2021-07-30 NOTE — PROGRESS NOTE ADULT - PROVIDER SPECIALTY LIST ADULT
Cardiology
Cardiology
Hospitalist
Cardiology
Hospitalist
Cardiology
Hospitalist
Hospitalist

## 2021-07-30 NOTE — PROGRESS NOTE ADULT - SUBJECTIVE AND OBJECTIVE BOX
S/P CATH SITE CHECK  Right groin soft with good pulse  foot warm with dp/pt pulse  CM Sinus ruddy in the 40's to Nsr  Groin instructions given to patient

## 2021-07-30 NOTE — DISCHARGE NOTE NURSING/CASE MANAGEMENT/SOCIAL WORK - PATIENT PORTAL LINK FT
You can access the FollowMyHealth Patient Portal offered by [facility] by registering at the following website: [email or web address]. By joining [employer]’s FollowMyHealth portal, you will also be able to view your health information using other applications (apps) compatible with our system.

## 2021-07-30 NOTE — PROGRESS NOTE ADULT - SUBJECTIVE AND OBJECTIVE BOX
[facility]                                                    [address], [address], [place] [address]                                                         PHONE: [phone]    FAX: [phone] [address], [address], [place] [address]                                                 PHONE: [phone]    FAX: [phone]  *******************************************************************************  cc: PAF/flutter    HPI:70F presented with SOB and GILMA. Hx of PAF. Echo on this admit with EF <20%, new as compared to prior echo studies. Echo 2/4/21 with EF 60-65%, LVH, mild MR/trace TR. Exercise nuclear stress test 12/12/19 EF 79%, no ischemia. Hx of PAF and intolerance of flecainide. Hx of asthma. Non diabetic. Former smoker. Hospitalization with echo <20%, RVE and hypokinesis and a dilated IVC, pleural effusion and acute systolic CHF. Cardiac cath performed with normal coronary anatomy and EF 45-50% and UMA/CV performed yesterday with normal RV , EF 50% and restoration of SR        Overnight events/Subjective Assessment: no new CV complaints    INTERPRETATION OF TELEMETRY (personally reviewed): PAF/flutter with HR to 150's, currently  with PAF    PAST MEDICAL & SURGICAL HISTORY:  Asthma    Hepatitis B    Palpitations  SVT, short runs VT on Holter    Atrial fibrillation    S/P knee surgery  R    S/P tubal ligation    S/P cataract extraction and insertion of intraocular lens    History of total right knee replacement  11/2018        carisoprodol (Hives)      MEDICATIONS  (STANDING):  aMIOdarone    Tablet 200 milliGRAM(s) Oral every 8 hours  apixaban 5 milliGRAM(s) Oral two times a day  budesonide 160 MICROgram(s)/formoterol 4.5 MICROgram(s) Inhaler 2 Puff(s) Inhalation two times a day  digoxin     Tablet 250 MICROGram(s) Oral daily  furosemide    Tablet 40 milliGRAM(s) Oral daily  losartan 25 milliGRAM(s) Oral daily  melatonin 3 milliGRAM(s) Oral at bedtime  metoprolol tartrate 25 milliGRAM(s) Oral every 8 hours    MEDICATIONS  (PRN):  ALPRAZolam 0.5 milliGRAM(s) Oral every 8 hours PRN anxiety  levalbuterol Inhalation 0.63 milliGRAM(s) Inhalation every 6 hours PRN Shortness of breath or weezing      Vital Signs Last 24 Hrs  T(C): 36.9 (30 Jul 2021 05:25), Max: 36.9 (30 Jul 2021 05:25)  T(F): 98.4 (30 Jul 2021 05:25), Max: 98.4 (30 Jul 2021 05:25)  HR: 69 (30 Jul 2021 05:25) (62 - 116)  BP: 106/68 (30 Jul 2021 05:25) (102/64 - 156/67)  BP(mean): --  RR: 18 (30 Jul 2021 05:25) (15 - 18)  SpO2: 100% (30 Jul 2021 05:25) (93% - 100%)    I&O's Detail    I&O's Summary          PHYSICAL EXAM:  General: Appears well developed, well nourished, no acute distress. not in acute pain  HEAD: normal cephalic. Atraumatic  PUPILS: equal and reactive to light  EARS: normal hearing  NECK: supple. no JVD or HJR. no carotid bruits. no visible lymphadenopathy  NOSE: no gross abnormalities  CHEST: symmetric chest wall expansion  CARDIOVASCULAR: Normal rate. Regular rhythm. Normal S1 and S2, no S3/S4,  no murmur, rub, or gallop  LUNGS: Normal effort. Normal respiratory rate. Breath sounds are clear to auscultation bilaterally. No respiratory distress. No stridor.  no rales, rhonchi or wheeze. no decreased Breath sounds  ABDOMEN: Soft, nontender, non-distended, positive bowel sounds, no mass or bruit. no abdominal tenderness. No rebound. no ascites  EXTREMITIES: No clubbing, cyanosis or edema. normal range of motion  PULSES:  distal pulses WNL  SKIN: Warm and dry with normal turgor. no visible rash or cyanosis   NEURO: Alert & oriented x 3, grossly intact with no focal weakness  PSYCH: normal mood and affect. Grossly normal insight and judgement exhibited    FAMILY HISTORY:  Family history of malignant hyperthermia (Child)    Family history of coronary artery disease (Father)        SOCIAL HISTORY:   active smoking. No ETOH/No IVDA    REVIEW OF SYSTEMS:  Constitutional: no fever, chills or malaise. No weight loss  Head: no trauma  Eyes: no visual deficit. No double vision  Ears: no hearing deficit or ringing in the ears  Nose: no nose bleeds or smell changes or congestion  Throat: no difficult swallowing or painful swallowing  Neck: supple. No lymphadenopathy or swelling  Respiratory: no SOB, wheeze, asthma, COPD. No cough. No blood in the sputum  Cardiovascular: no CP, palpitations, irregular heart beats. No edema. No PND. No orthopnea. No skin/temperature or color changes  Gastrointestinal: no abdominal pain. No constipation. No diarrhea. No melena. No nausea. No vomiting. No bloating  Genitourinary: no frequency or urgency. No hematuria  Lymphatics: no grossly swollen lymph nodes  Musculoskeletal: no limitation of range of motion. Normal strength. No pain  Integumentary: no visible rash. No itching  Neurologic: no HA. No TIA or stroke symptoms. No seizure. No hx of epilepsy. No tingling or numbness. No weakness. No dizziness  Psychiatric: denied. Reports appropriate mood.        LABS:                        14.4   7.07  )-----------( 228      ( 30 Jul 2021 07:47 )             44.7     07-29    141  |  104  |  10.2  ----------------------------<  97  4.0   |  25.0  |  0.57    Ca    8.7      29 Jul 2021 07:27    TPro  6.2<L>  /  Alb  3.5  /  TBili  1.0  /  DBili  0.2  /  AST  30  /  ALT  87<H>  /  AlkPhos  97  07-29          Serum Pro-Brain Natriuretic Peptide: 5303 pg/mL (07-26 @ 07:51)  serum  Lipids:     Thyroid Stimulating Hormone, Serum: 2.16 uIU/mL (07-26 @ 07:51)      RADIOLOGY & ADDITIONAL STUDIES:    ECG:    ECHO:    STRESS TEST:    CARDIAC CATHETERIZATION: < from: Cardiac Cath Lab - Adult (07.29.21 @ 07:29) >  VENTRICLES: Global left ventricular function was mildly depressed. EF  calculated by contrast ventriculography was 45 % and EF estimated was 45  %.  CORONARY VESSELS: The coronary circulation is right dominant.  LM:   --  LM: Normal.  LAD:   --  LAD: Angiography showed minor luminal irregularities with no  flow limiting lesions.  CX:   --  Circumflex: Angiography showed minor luminal irregularities with  no flow limiting lesions.  RCA:   --  RCA: Angiography showed minor luminal irregularities with no  flow limiting lesions.  COMPLICATIONS: No complications occurred during the cath lab visit.  DIAGNOSTIC IMPRESSIONS: Persistent atrial fibrillation  New dropin LVEF by echo  Normal right and left heart hemodynamics and cardiac index  No evidence of obstructive CAD  MILD LV dysfunction LVEF 45%  DIAGNOSTIC RECOMMENDATIONS: Continue medical therapy  Restart eliquis later today  Lovenox x 1 in six hours  UMA/CV tomorrow (I will arrange)  Ultimately she will need an elective AF ablation with Dr Adams.  INTERVENTIONAL IMPRESSIONS: Persistent atrial fibrillation  New drop in LVEF by echo  Normal right and left heart hemodynamics and cardiac index  No evidence of obstructive CAD  MILD LV dysfunction LVEF 45%  INTERVENTIONAL RECOMMENDATIONS: Continue medical therapy  Restart eliquis later today  Lovenox x 1 in six hours  UMA/CV tomorrow (I will arrange)  Ultimately she will need an elective AF ablation with Dr Adams.  Prepared and signed by  Myron Mederos MD    < end of copied text >    < from: UMA Echo Doppler (07.29.21 @ 10:00) >  Summary:   1. Moderately decreasedglobal left ventricular systolic function. Left ventricular ejection fraction, by visual estimation, is 40-45%. 3D LVEF on QLAB 45.6%.   2. Normal right ventricular size and systolic function.   3. Moderately enlarged right atrium.   4. Mildly enlarged left atrium.   5. Trileaflet aortic valve without stenosis. Trace aortic regurgitation.   6. 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 Mild mitral valve regurgitation.   7. Moderate tricuspid regurgitation.   8. Bilateral pleural effusion in the left greater than right regions. There is no evidence of pericardial effusion.   9. After confrmation of the absence of BHARAT thrombus, DCCV was performed using 200J DC with restoration of normal sinus rhythm.    < end of copied text >    ASSESSMENT AND PLAN:  In summary, MELO BERRIOS is a 70y Female with past medical history significant for presentation with SOB and GILMA. Hx of PAF. Echo on this admit with EF <20%, new as compared to prior echo studies. Echo 2/4/21 with EF 60-65%, LVH, mild MR/trace TR. Exercise nuclear stress test 12/12/19 EF 79%, no ischemia. Hx of PAF and intolerance of flecainide. Hx of asthma. Non diabetic. Former smoker. Hospitalization with echo <20%, RVE and hypokinesis and a dilated IVC, pleural effusion and acute systolic CHF. Cardiac cath performed with normal coronary anatomy and EF 45-50% and UMA/CV performed yesterday with normal RV , EF 50% and restoration of SR    - The patient has been chest pain free since admission with negative serial troponin and has now ruled out for acute MI. Cardiac cath with normal coronary anatomy    - CM. rate related with improvement of EF with better rate control.    - PAF. s/p DCCV with restoration of SR and subsequent PAF/flutter despite medications for rate and rhythm control. On eliquis due to elevated [id number] score    - amiodarone 200mg po q8, digoxin 0.25 mg daily, metoprolol 25mg po q8    - s/p HFrEF with biventricular failure, now resolved. lasix 40mg daily, losartan 25mg daily    - Echocardiogram (07/2021) reviewed: severe diffuse global hypokinesis with LVEF <20%; moderately dilated RV with severely reduced RV systolic function; severely dilated RA; moderately dilated LA mild-mod MR.  Prior echo from 02/2021 showing normal LV systolic function without hemodynamically significant valvular lesions.  Subsequent assessment with EF 45-50% as above    - Telemetry monitoring personally reviewed by me. PAF/flutter avg . Intermittently up to     - radiologic imaging reviewed    - Laboratory data reviewed.    - I have personally reviewed all obtainable prior records and data    - I personally dw Dr Adams of EP.  He requests addition of verapamil 40mg po BID. As pt is hemodynamically stable and EF is 50%, he feels patient may be DC'd today and see him early next week in the office for arrangement of possible ablation procedure    - May DC home today on above meds with early fu via our office with Dr Adams next week.     Sheron Rangel MD

## 2021-07-30 NOTE — PROGRESS NOTE ADULT - REASON FOR ADMISSION
palpitations

## 2022-04-07 NOTE — PROCEDURE NOTE - NSPOSTPROCRHYTH1_CARD_A_CORE
Noted, okay to fill one month until patient establishes. PCP removed from his list.    normal sinus rhythm

## 2022-04-29 PROBLEM — I48.91 UNSPECIFIED ATRIAL FIBRILLATION: Chronic | Status: ACTIVE | Noted: 2021-07-26

## 2022-06-10 ENCOUNTER — APPOINTMENT (OUTPATIENT)
Dept: ORTHOPEDIC SURGERY | Facility: CLINIC | Age: 72
End: 2022-06-10

## 2023-01-27 NOTE — PATIENT PROFILE ADULT. - BILL OF RIGHTS/ADMISSION INFORMATION PROVIDED TO:
Render In Strict Bullet Format?: No
Detail Level: Zone
Continue Regimen: Skyrizi injections at home
Patient

## 2023-04-06 NOTE — ASU PREOP CHECKLIST - WAS PATIENT ON BETA BLOCKER?
Physical Therapy Visit    Visit Type: Daily Treatment Note  Visit: 2  Referring Provider: Juan Saenz MD  Medical Diagnosis (from order): Diagnosis Information    Diagnosis  726.10 (ICD-9-CM) - M75.81 (ICD-10-CM) - Tendinitis of right rotator cuff  719.41 (ICD-9-CM) - M25.511 (ICD-10-CM) - Right shoulder pain, unspecified chronicity         SUBJECTIVE                                                                                                               Reports that his shoulder has been okay lately. Continues to have activity dependent tissue irritability.   Functional Change: See above    Pain / Symptoms  - Pain rating (out of 10): Current: 0       OBJECTIVE                                                                                                                                       Treatment     Therapeutic Exercise  NuStep level 5 x 8 minute, UE use   Passive range of motion, cardinal planes of motion glenohumeral joint x25 each  Prone scapular sets 2x20  Rotator cuff isometrics, 25, 2-5 second holds, varying positions of rotation  Scapular sit backs x20    Manual Therapy   Glenohumeral joint mobilizations into inferior direction, grade IV,III, 2x10  Glenohumeral joint mobilizations into posterior direction, grade IV,III, 2x10  Glenohumeral joint traction mobilization, grade IV 2x10  Costovertebral joint mobilizations T2-7 (B) grade II,III each  Prone thoracic gap mobilizations T2-7 grade III each      Skilled input: verbal instruction/cues and tactile instruction/cues    Writer verbally educated and received verbal consent for hand placement, positioning of patient, and techniques to be performed today from patient for clothing adjustments for techniques as described above and how they are pertinent to the patient's plan of care.    Home Exercise Program  Scapular sit backs x20      ASSESSMENT                                                                                                             Improving scapular upward rotation with humeral head congruency with movements. Patient remains limited with end range motions, although improving. Will advance as Russ tolerates.   Pain/symptoms after session (out of 10): 0  Education:   - Results of above outlined education: Verbalizes understanding and Demonstrates understanding    PLAN                                                                                                                           Suggestions for next session as indicated: Progress per plan of care, manual prn, continue with scapular stability        Therapy procedure time and total treatment time can be found documented on the Time Entry flowsheet     Yes

## 2023-04-21 NOTE — PHYSICAL EXAM
Patient called stating insurance does not approve for he's insulin states insurance needs clarification on why patient needs insulin requesting call back from nurse. [General Appearance - Alert] : alert [General Appearance - In No Acute Distress] : in no acute distress [General Appearance - Well Nourished] : well nourished [General Appearance - Well Developed] : well developed [Sclera] : the sclera and conjunctiva were normal [Outer Ear] : the ears and nose were normal in appearance [Hearing Threshold Finger Rub Not [place]] : hearing was normal [Both Tympanic Membranes Were Examined] : both tympanic membranes were normal [] : no respiratory distress [Respiration, Rhythm And Depth] : normal respiratory rhythm and effort [Exaggerated Use Of Accessory Muscles For Inspiration] : no accessory muscle use [Auscultation Breath Sounds / Voice Sounds] : lungs were clear to auscultation bilaterally [Apical Impulse] : the apical impulse was normal [Heart Rate And Rhythm] : heart rate was normal and rhythm regular [Heart Sounds] : normal S1 and S2 [Bowel Sounds] : normal bowel sounds [Abdomen Soft] : soft [Abdomen Tenderness] : non-tender [Oriented To Time, Place, And Person] : oriented to person, place, and time [Impaired Insight] : insight and judgment were intact [Affect] : the affect was normal

## 2023-06-14 NOTE — DISCHARGE NOTE NURSING/CASE MANAGEMENT/SOCIAL WORK - NSDPACMPNY_GEN_ALL_CORE
Detail Level: Generalized
Price (Do Not Change): 0.00
Family
Instructions: This plan will send the code FBSD to the PM system.  DO NOT or CHANGE the price.

## 2023-07-10 NOTE — ED PROVIDER NOTE - ATTENDING CONTRIBUTION TO CARE
CC.  respiratory distress  HPI.  Patient reports that she feels better.  SOB, and cough are improving  afebrile               Constitutional: No fever, fatigue or weight loss.  Skin: No rash.  Eyes: No recent vision problems or eye pain.  ENT: No congestion, ear pain, or sore throat.  Endocrine: No thyroid problems.  Cardiovascular: No chest pain or palpation.  Respiratory: No  congestion, or wheezing.  Gastrointestinal: No abdominal pain, nausea, vomiting, or diarrhea.  Genitourinary: No dysuria.  Musculoskeletal: No joint swelling.  Neurologic: No headache.      Vital Signs Last 24 Hrs  T(C): 36.2 (07-10-23 @ 07:10), Max: 37.5 (07-09-23 @ 22:00)  T(F): 97.1 (07-10-23 @ 07:10), Max: 99.5 (07-09-23 @ 22:00)  HR: 103 (07-10-23 @ 07:29) (103 - 111)  BP: 118/63 (07-10-23 @ 07:29) (95/54 - 118/63)  BP(mean): 84 (07-10-23 @ 07:29) (72 - 84)  RR: 31 (07-10-23 @ 07:29) (24 - 31)  SpO2: 96% (07-10-23 @ 07:29) (94% - 97%)        PHYSICAL EXAM-  GENERAL: NAD,   HEAD:  Atraumatic, Normocephalic  EYES: EOMI, PERRLA, conjunctiva and sclera clear  NECK: Supple, No JVD, Normal thyroid  NERVOUS SYSTEM:  Alert & Oriented moving all extremities  CHEST/LUNG:  + rhonchi with good air entry  HEART: Regular rate and rhythm; No murmurs, rubs, or gallops  ABDOMEN: Soft, Nontender, Nondistended; Bowel sounds present  EXTREMITIES:   No clubbing, cyanosis, or edema  SKIN: No rashes or lesions                                  7.4    8.25  )-----------( 203      ( 10 Jul 2023 05:57 )             26.5     07-10    145  |  102  |  43<H>  ----------------------------<  81  3.6   |  33<H>  |  0.9    Ca    9.0      10 Jul 2023 05:57  Phos  3.0     07-09  Mg     2.2     07-10    TPro  6.0  /  Alb  2.8<L>  /  TBili  0.4  /  DBili  x   /  AST  16  /  ALT  9   /  AlkPhos  154<H>  07-10          Urinalysis Basic - ( 10 Jul 2023 05:57 )    Color: x / Appearance: x / SG: x / pH: x  Gluc: 81 mg/dL / Ketone: x  / Bili: x / Urobili: x   Blood: x / Protein: x / Nitrite: x   Leuk Esterase: x / RBC: x / WBC x   Sq Epi: x / Non Sq Epi: x / Bacteria: x              MEDICATIONS  (STANDING):  acetaZOLAMIDE  IVPB 500 milliGRAM(s) IV Intermittent daily  aztreonam  IVPB 2000 milliGRAM(s) IV Intermittent every 8 hours  budesonide 160 MICROgram(s)/formoterol 4.5 MICROgram(s) Inhaler 2 Puff(s) Inhalation two times a day  chlorhexidine 2% Cloths 1 Application(s) Topical <User Schedule>  dextrose 5%. 1000 milliLiter(s) (50 mL/Hr) IV Continuous <Continuous>  dextrose 50% Injectable 25 Gram(s) IV Push once  doxycycline IVPB 100 milliGRAM(s) IV Intermittent every 12 hours  glucagon  Injectable 1 milliGRAM(s) IntraMuscular once  insulin lispro (ADMELOG) corrective regimen sliding scale   SubCutaneous three times a day before meals  lactulose Syrup 20 Gram(s) Oral three times a day  nystatin Powder 1 Application(s) Topical every 12 hours   pantoprazole  Injectable 40 milliGRAM(s) IV Push daily  polyethylene glycol 3350 17 Gram(s) Oral every 12 hours  senna 2 Tablet(s) Oral at bedtime  sodium chloride 0.9%. 1000 milliLiter(s) (60 mL/Hr) IV Continuous <Continuous>    MEDICATIONS  (PRN):  dextrose Oral Gel 15 Gram(s) Oral once PRN Blood Glucose LESS THAN 70 milliGRAM(s)/deciliter          Imaging Personally Reviewed:     [x ] YES  [ ] NO    Consultant(s) Notes Reviewed:  [x ] YES  [ ] NO    Care Discussed with Consultants/Other Providers [x ] YES  [ ] NO      I, Corrie Cuevas, have personally seen and examined this patient. I have fully participated in the care of this patient. I have reviewed all pertinent clinical information, including history, physical exam, plan and the Resident's note and agree except as noted below.     71yo F with asthma and a fib on metoprolol/eliquis, last week started with abd pain/cramping and diarrhea for 2 days, decreased PO intake. no vomiting. then noted intermittent palpitations. over the weekend palpitaitons got worse, increased her metoprolol which worsened her SOB/asthma so increased her rescue inhaler. no cough. no fever/chills. SOB worse on exertion and lying flat. no h/o CHF. no missed medications. no bleeding. no CP. no h/o ACS. nonsmoker     Gen: NAD, AOx3  Head: NCAT  HEENT: EOMI, oral mucosa moist, normal conjunctiva, neck supple  Lung: CTAB, mild tachypnea speaking in full sentences   CV: tach regular, no murmur, Normal perfusion  Abd: soft, NTND  MSK: No edema, no visible deformities  Neuro: No focal neurologic deficits  Skin: No rash   Psych: normal affect     patient well appearing, EKG appears sinus tach, old Q wave anteriorly. possible hypovolemia/electrolyte derangement. patient on eliquis no clinical sign of DVT or risk for DVT/PE no need for further w/u. will send trop and bnp. fluids. cardizem prn. reasses I, Corrie Cuevas, have personally seen and examined this patient. I have fully participated in the care of this patient. I have reviewed all pertinent clinical information, including history, physical exam, plan and the Resident's note and agree except as noted below.     71yo F with asthma and a fib on metoprolol/eliquis, last week started with abd pain/cramping and diarrhea for 2 days, decreased PO intake. no vomiting. then noted intermittent palpitations. over the weekend palpitaitons got worse, increased her metoprolol which worsened her SOB/asthma so increased her rescue inhaler. no cough. no fever/chills. SOB worse on exertion and lying flat. no h/o CHF. no missed medications. no bleeding. no CP. no h/o ACS. nonsmoker     Gen: NAD, AOx3  Head: NCAT  HEENT: EOMI, oral mucosa moist, normal conjunctiva, neck supple  Lung: CTAB, mild tachypnea speaking in full sentences   CV: tach regular, no murmur, Normal perfusion  Abd: soft, NTND  MSK: No edema, no visible deformities  Neuro: No focal neurologic deficits  Skin: No rash   Psych: normal affect     patient well appearing, EKG appears sinus tach, old Q wave anteriorly. possible hypovolemia/electrolyte derangement. patient on eliquis no clinical sign of DVT or risk for DVT/PE no need for further w/u. will send trop and bnp. fluids. rate control prn

## 2023-09-15 ASSESSMENT — KOOS JR
IMPORTED LATERALITY: LEFT
GOING UP OR DOWN STAIRS: SEVERE
TWISING OR PIVOTING ON KNEE: MODERATE
HOW SEVERE IS YOUR KNEE STIFFNESS AFTER FIRST WAKING IN MORNING: MILD
KOOS JR RAW SCORE: 9
IMPORTED FORM: YES
IMPORTED KOOS JR SCORE: 63.78
RISING FROM SITTING: MILD
STRAIGHTENING KNEE FULLY: MILD
STANDING UPRIGHT: MILD

## 2023-11-21 ENCOUNTER — NON-APPOINTMENT (OUTPATIENT)
Age: 73
End: 2023-11-21

## 2024-04-28 NOTE — H&P PST ADULT - ADMIT DATE
Status post ORIF right ankle/pilon fracture 04/27/2024     Doing well   Pain tolerable   Compartments soft and compressible, brisk cap refill, sensation intact to light touch the toes   Able to flex and extend all toes       POSTOPERATIVE PLAN:  35F, + EtOH  Fall 4.26.24  R ankle trimal fx/dislocation/Pilon variant      4.27.24 - ORIF R Pilon/trimal ankle fracture       Antibiotics x 24 hr  ASA 81 mg b.i.d. x6 weeks for DVT prophylaxis  Nonweightbearing R lower extremity x6 weeks postop.     Calcium, vitamin-D       X-rays at subsequent followups:  R ankle (starting at 2-3 wks postop)     Follow-up postop   1 week - remove splint, dressing change, either short leg cast or plaster splint w/ posterior slab and stirrups to obtain neutral dorsiflexion  2-3 weeks (boot, suture removal, XR)  6 weeks, 3 months, 6 months, 1 year   24-Oct-2018

## 2025-08-17 ENCOUNTER — INPATIENT (INPATIENT)
Facility: HOSPITAL | Age: 75
LOS: 3 days | Discharge: NURSING, REHAB OR LONG-TERM CARE | DRG: 563 | End: 2025-08-21
Attending: HOSPITALIST | Admitting: STUDENT IN AN ORGANIZED HEALTH CARE EDUCATION/TRAINING PROGRAM
Payer: MEDICARE

## 2025-08-17 VITALS
OXYGEN SATURATION: 97 % | TEMPERATURE: 98 F | HEART RATE: 74 BPM | RESPIRATION RATE: 18 BRPM | SYSTOLIC BLOOD PRESSURE: 109 MMHG | DIASTOLIC BLOOD PRESSURE: 63 MMHG | WEIGHT: 154.98 LBS | HEIGHT: 67 IN

## 2025-08-17 DIAGNOSIS — Z98.51 TUBAL LIGATION STATUS: Chronic | ICD-10-CM

## 2025-08-17 DIAGNOSIS — Z96.651 PRESENCE OF RIGHT ARTIFICIAL KNEE JOINT: Chronic | ICD-10-CM

## 2025-08-17 DIAGNOSIS — Z98.890 OTHER SPECIFIED POSTPROCEDURAL STATES: Chronic | ICD-10-CM

## 2025-08-17 DIAGNOSIS — Z98.49 CATARACT EXTRACTION STATUS, UNSPECIFIED EYE: Chronic | ICD-10-CM

## 2025-08-17 LAB
ALBUMIN SERPL ELPH-MCNC: 3.9 G/DL — SIGNIFICANT CHANGE UP (ref 3.3–5.2)
ALP SERPL-CCNC: 71 U/L — SIGNIFICANT CHANGE UP (ref 40–120)
ALT FLD-CCNC: 19 U/L — SIGNIFICANT CHANGE UP
ANION GAP SERPL CALC-SCNC: 11 MMOL/L — SIGNIFICANT CHANGE UP (ref 5–17)
APTT BLD: 28.2 SEC — SIGNIFICANT CHANGE UP (ref 26.1–36.8)
AST SERPL-CCNC: 24 U/L — SIGNIFICANT CHANGE UP
BASOPHILS # BLD AUTO: 0.04 K/UL — SIGNIFICANT CHANGE UP (ref 0–0.2)
BASOPHILS NFR BLD AUTO: 0.3 % — SIGNIFICANT CHANGE UP (ref 0–2)
BILIRUB SERPL-MCNC: 0.5 MG/DL — SIGNIFICANT CHANGE UP (ref 0.4–2)
BLD GP AB SCN SERPL QL: SIGNIFICANT CHANGE UP
BUN SERPL-MCNC: 13.1 MG/DL — SIGNIFICANT CHANGE UP (ref 8–20)
CALCIUM SERPL-MCNC: 8.8 MG/DL — SIGNIFICANT CHANGE UP (ref 8.4–10.5)
CHLORIDE SERPL-SCNC: 102 MMOL/L — SIGNIFICANT CHANGE UP (ref 96–108)
CO2 SERPL-SCNC: 25 MMOL/L — SIGNIFICANT CHANGE UP (ref 22–29)
CREAT SERPL-MCNC: 0.62 MG/DL — SIGNIFICANT CHANGE UP (ref 0.5–1.3)
EGFR: 95 ML/MIN/1.73M2 — SIGNIFICANT CHANGE UP
EGFR: 95 ML/MIN/1.73M2 — SIGNIFICANT CHANGE UP
EOSINOPHIL # BLD AUTO: 0.21 K/UL — SIGNIFICANT CHANGE UP (ref 0–0.5)
EOSINOPHIL NFR BLD AUTO: 1.8 % — SIGNIFICANT CHANGE UP (ref 0–6)
GLUCOSE SERPL-MCNC: 99 MG/DL — SIGNIFICANT CHANGE UP (ref 70–99)
HCT VFR BLD CALC: 44.7 % — SIGNIFICANT CHANGE UP (ref 34.5–45)
HGB BLD-MCNC: 14.6 G/DL — SIGNIFICANT CHANGE UP (ref 11.5–15.5)
IMM GRANULOCYTES # BLD AUTO: 0.05 K/UL — SIGNIFICANT CHANGE UP (ref 0–0.07)
IMM GRANULOCYTES NFR BLD AUTO: 0.4 % — SIGNIFICANT CHANGE UP (ref 0–0.9)
INR BLD: 1.03 RATIO — SIGNIFICANT CHANGE UP (ref 0.85–1.16)
LYMPHOCYTES # BLD AUTO: 1.24 K/UL — SIGNIFICANT CHANGE UP (ref 1–3.3)
LYMPHOCYTES NFR BLD AUTO: 10.8 % — LOW (ref 13–44)
MCHC RBC-ENTMCNC: 30.7 PG — SIGNIFICANT CHANGE UP (ref 27–34)
MCHC RBC-ENTMCNC: 32.7 G/DL — SIGNIFICANT CHANGE UP (ref 32–36)
MCV RBC AUTO: 94.1 FL — SIGNIFICANT CHANGE UP (ref 80–100)
MONOCYTES # BLD AUTO: 0.91 K/UL — HIGH (ref 0–0.9)
MONOCYTES NFR BLD AUTO: 7.9 % — SIGNIFICANT CHANGE UP (ref 2–14)
NEUTROPHILS # BLD AUTO: 9.06 K/UL — HIGH (ref 1.8–7.4)
NEUTROPHILS NFR BLD AUTO: 78.8 % — HIGH (ref 43–77)
NRBC # BLD AUTO: 0 K/UL — SIGNIFICANT CHANGE UP (ref 0–0)
NRBC # FLD: 0 K/UL — SIGNIFICANT CHANGE UP (ref 0–0)
NRBC BLD AUTO-RTO: 0 /100 WBCS — SIGNIFICANT CHANGE UP (ref 0–0)
PLATELET # BLD AUTO: 267 K/UL — SIGNIFICANT CHANGE UP (ref 150–400)
PMV BLD: 8.8 FL — SIGNIFICANT CHANGE UP (ref 7–13)
POTASSIUM SERPL-MCNC: 4.4 MMOL/L — SIGNIFICANT CHANGE UP (ref 3.5–5.3)
POTASSIUM SERPL-SCNC: 4.4 MMOL/L — SIGNIFICANT CHANGE UP (ref 3.5–5.3)
PROT SERPL-MCNC: 6.8 G/DL — SIGNIFICANT CHANGE UP (ref 6.6–8.7)
PROTHROM AB SERPL-ACNC: 11.9 SEC — SIGNIFICANT CHANGE UP (ref 9.9–13.4)
RBC # BLD: 4.75 M/UL — SIGNIFICANT CHANGE UP (ref 3.8–5.2)
RBC # FLD: 14.6 % — HIGH (ref 10.3–14.5)
SODIUM SERPL-SCNC: 138 MMOL/L — SIGNIFICANT CHANGE UP (ref 135–145)
WBC # BLD: 11.51 K/UL — HIGH (ref 3.8–10.5)
WBC # FLD AUTO: 11.51 K/UL — HIGH (ref 3.8–10.5)

## 2025-08-17 PROCEDURE — 73564 X-RAY EXAM KNEE 4 OR MORE: CPT | Mod: 26,LT

## 2025-08-17 PROCEDURE — 73700 CT LOWER EXTREMITY W/O DYE: CPT | Mod: 26,LT

## 2025-08-17 PROCEDURE — 99285 EMERGENCY DEPT VISIT HI MDM: CPT | Mod: GC

## 2025-08-17 PROCEDURE — 73552 X-RAY EXAM OF FEMUR 2/>: CPT | Mod: 26,LT

## 2025-08-17 PROCEDURE — 73590 X-RAY EXAM OF LOWER LEG: CPT | Mod: 26,LT

## 2025-08-17 PROCEDURE — 73502 X-RAY EXAM HIP UNI 2-3 VIEWS: CPT | Mod: 26,LT

## 2025-08-17 RX ORDER — HYDROMORPHONE/SOD CHLOR,ISO/PF 2 MG/10 ML
0.5 SYRINGE (ML) INJECTION ONCE
Refills: 0 | Status: DISCONTINUED | OUTPATIENT
Start: 2025-08-17 | End: 2025-08-17

## 2025-08-17 RX ORDER — SODIUM CHLORIDE 9 G/1000ML
1000 INJECTION, SOLUTION INTRAVENOUS ONCE
Refills: 0 | Status: COMPLETED | OUTPATIENT
Start: 2025-08-17 | End: 2025-08-17

## 2025-08-17 RX ORDER — KETOROLAC TROMETHAMINE 30 MG/ML
15 INJECTION, SOLUTION INTRAMUSCULAR; INTRAVENOUS EVERY 4 HOURS
Refills: 0 | Status: DISCONTINUED | OUTPATIENT
Start: 2025-08-17 | End: 2025-08-21

## 2025-08-17 RX ORDER — HYDROMORPHONE/SOD CHLOR,ISO/PF 2 MG/10 ML
0.5 SYRINGE (ML) INJECTION EVERY 4 HOURS
Refills: 0 | Status: DISCONTINUED | OUTPATIENT
Start: 2025-08-17 | End: 2025-08-19

## 2025-08-17 RX ORDER — HYDROMORPHONE/SOD CHLOR,ISO/PF 2 MG/10 ML
0.5 SYRINGE (ML) INJECTION ONCE
Refills: 0 | Status: DISCONTINUED | OUTPATIENT
Start: 2025-08-17 | End: 2025-08-18

## 2025-08-17 RX ORDER — ACETAMINOPHEN 500 MG/5ML
650 LIQUID (ML) ORAL EVERY 6 HOURS
Refills: 0 | Status: DISCONTINUED | OUTPATIENT
Start: 2025-08-17 | End: 2025-08-21

## 2025-08-17 RX ORDER — LIDOCAINE HYDROCHLORIDE 20 MG/ML
1 JELLY TOPICAL ONCE
Refills: 0 | Status: COMPLETED | OUTPATIENT
Start: 2025-08-17 | End: 2025-08-17

## 2025-08-17 RX ORDER — KETOROLAC TROMETHAMINE 30 MG/ML
15 INJECTION, SOLUTION INTRAMUSCULAR; INTRAVENOUS ONCE
Refills: 0 | Status: DISCONTINUED | OUTPATIENT
Start: 2025-08-17 | End: 2025-08-17

## 2025-08-17 RX ORDER — ACETAMINOPHEN 500 MG/5ML
1000 LIQUID (ML) ORAL ONCE
Refills: 0 | Status: COMPLETED | OUTPATIENT
Start: 2025-08-17 | End: 2025-08-17

## 2025-08-17 RX ORDER — CYCLOBENZAPRINE HYDROCHLORIDE 15 MG/1
5 CAPSULE, EXTENDED RELEASE ORAL THREE TIMES A DAY
Refills: 0 | Status: DISCONTINUED | OUTPATIENT
Start: 2025-08-17 | End: 2025-08-21

## 2025-08-17 RX ADMIN — KETOROLAC TROMETHAMINE 15 MILLIGRAM(S): 30 INJECTION, SOLUTION INTRAMUSCULAR; INTRAVENOUS at 14:28

## 2025-08-17 RX ADMIN — Medication 0.5 MILLIGRAM(S): at 17:41

## 2025-08-17 RX ADMIN — Medication 0.5 MILLIGRAM(S): at 14:28

## 2025-08-17 RX ADMIN — Medication 0.5 MILLIGRAM(S): at 12:12

## 2025-08-17 RX ADMIN — Medication 0.5 MILLIGRAM(S): at 22:22

## 2025-08-17 RX ADMIN — Medication 0.5 MILLIGRAM(S): at 23:15

## 2025-08-17 RX ADMIN — CYCLOBENZAPRINE HYDROCHLORIDE 5 MILLIGRAM(S): 15 CAPSULE, EXTENDED RELEASE ORAL at 22:23

## 2025-08-17 RX ADMIN — CYCLOBENZAPRINE HYDROCHLORIDE 5 MILLIGRAM(S): 15 CAPSULE, EXTENDED RELEASE ORAL at 12:14

## 2025-08-17 RX ADMIN — KETOROLAC TROMETHAMINE 15 MILLIGRAM(S): 30 INJECTION, SOLUTION INTRAMUSCULAR; INTRAVENOUS at 22:23

## 2025-08-17 RX ADMIN — Medication 1000 MILLIGRAM(S): at 23:15

## 2025-08-17 RX ADMIN — LIDOCAINE HYDROCHLORIDE 1 PATCH: 20 JELLY TOPICAL at 12:12

## 2025-08-17 RX ADMIN — SODIUM CHLORIDE 1000 MILLILITER(S): 9 INJECTION, SOLUTION INTRAVENOUS at 12:13

## 2025-08-17 RX ADMIN — Medication 400 MILLIGRAM(S): at 12:12

## 2025-08-17 RX ADMIN — Medication 400 MILLIGRAM(S): at 22:22

## 2025-08-17 RX ADMIN — KETOROLAC TROMETHAMINE 15 MILLIGRAM(S): 30 INJECTION, SOLUTION INTRAMUSCULAR; INTRAVENOUS at 23:15

## 2025-08-18 DIAGNOSIS — S82.142A DISPLACED BICONDYLAR FRACTURE OF LEFT TIBIA, INITIAL ENCOUNTER FOR CLOSED FRACTURE: ICD-10-CM

## 2025-08-18 PROCEDURE — 97163 PT EVAL HIGH COMPLEX 45 MIN: CPT

## 2025-08-18 PROCEDURE — 85610 PROTHROMBIN TIME: CPT

## 2025-08-18 PROCEDURE — 86901 BLOOD TYPING SEROLOGIC RH(D): CPT

## 2025-08-18 PROCEDURE — 85025 COMPLETE CBC W/AUTO DIFF WBC: CPT

## 2025-08-18 PROCEDURE — 80053 COMPREHEN METABOLIC PANEL: CPT

## 2025-08-18 PROCEDURE — 73564 X-RAY EXAM KNEE 4 OR MORE: CPT

## 2025-08-18 PROCEDURE — 86900 BLOOD TYPING SEROLOGIC ABO: CPT

## 2025-08-18 PROCEDURE — 73502 X-RAY EXAM HIP UNI 2-3 VIEWS: CPT

## 2025-08-18 PROCEDURE — 99223 1ST HOSP IP/OBS HIGH 75: CPT

## 2025-08-18 PROCEDURE — 73700 CT LOWER EXTREMITY W/O DYE: CPT

## 2025-08-18 PROCEDURE — 85730 THROMBOPLASTIN TIME PARTIAL: CPT

## 2025-08-18 PROCEDURE — 73552 X-RAY EXAM OF FEMUR 2/>: CPT

## 2025-08-18 PROCEDURE — 36415 COLL VENOUS BLD VENIPUNCTURE: CPT

## 2025-08-18 PROCEDURE — 73590 X-RAY EXAM OF LOWER LEG: CPT

## 2025-08-18 PROCEDURE — 86850 RBC ANTIBODY SCREEN: CPT

## 2025-08-18 PROCEDURE — 94640 AIRWAY INHALATION TREATMENT: CPT

## 2025-08-18 RX ORDER — ALPRAZOLAM 0.5 MG
1 TABLET, EXTENDED RELEASE 24 HR ORAL
Refills: 0 | DISCHARGE

## 2025-08-18 RX ORDER — METOPROLOL SUCCINATE 50 MG/1
25 TABLET, EXTENDED RELEASE ORAL EVERY 8 HOURS
Refills: 0 | Status: DISCONTINUED | OUTPATIENT
Start: 2025-08-18 | End: 2025-08-19

## 2025-08-18 RX ORDER — ALPRAZOLAM 0.5 MG
0.5 TABLET, EXTENDED RELEASE 24 HR ORAL AT BEDTIME
Refills: 0 | Status: DISCONTINUED | OUTPATIENT
Start: 2025-08-18 | End: 2025-08-21

## 2025-08-18 RX ORDER — ESCITALOPRAM OXALATE 20 MG/1
1 TABLET ORAL
Refills: 0 | DISCHARGE

## 2025-08-18 RX ORDER — ESCITALOPRAM OXALATE 20 MG/1
5 TABLET ORAL DAILY
Refills: 0 | Status: DISCONTINUED | OUTPATIENT
Start: 2025-08-18 | End: 2025-08-21

## 2025-08-18 RX ORDER — ENOXAPARIN SODIUM 100 MG/ML
40 INJECTION SUBCUTANEOUS EVERY 24 HOURS
Refills: 0 | Status: DISCONTINUED | OUTPATIENT
Start: 2025-08-18 | End: 2025-08-21

## 2025-08-18 RX ORDER — FEXOFENADINE/PSEUDOEPHEDRINE 60MG-120MG
1 TABLET, EXTENDED RELEASE 12 HR ORAL
Refills: 0 | DISCHARGE

## 2025-08-18 RX ADMIN — Medication 1 DOSE(S): at 20:23

## 2025-08-18 RX ADMIN — KETOROLAC TROMETHAMINE 15 MILLIGRAM(S): 30 INJECTION, SOLUTION INTRAMUSCULAR; INTRAVENOUS at 22:15

## 2025-08-18 RX ADMIN — Medication 0.5 MILLIGRAM(S): at 03:35

## 2025-08-18 RX ADMIN — CYCLOBENZAPRINE HYDROCHLORIDE 5 MILLIGRAM(S): 15 CAPSULE, EXTENDED RELEASE ORAL at 21:08

## 2025-08-18 RX ADMIN — Medication 650 MILLIGRAM(S): at 08:28

## 2025-08-18 RX ADMIN — KETOROLAC TROMETHAMINE 15 MILLIGRAM(S): 30 INJECTION, SOLUTION INTRAMUSCULAR; INTRAVENOUS at 21:15

## 2025-08-18 RX ADMIN — Medication 0.5 MILLIGRAM(S): at 11:45

## 2025-08-18 RX ADMIN — Medication 0.5 MILLIGRAM(S): at 16:29

## 2025-08-18 RX ADMIN — CYCLOBENZAPRINE HYDROCHLORIDE 5 MILLIGRAM(S): 15 CAPSULE, EXTENDED RELEASE ORAL at 13:32

## 2025-08-18 RX ADMIN — Medication 650 MILLIGRAM(S): at 13:36

## 2025-08-18 RX ADMIN — CYCLOBENZAPRINE HYDROCHLORIDE 5 MILLIGRAM(S): 15 CAPSULE, EXTENDED RELEASE ORAL at 05:35

## 2025-08-18 RX ADMIN — KETOROLAC TROMETHAMINE 15 MILLIGRAM(S): 30 INJECTION, SOLUTION INTRAMUSCULAR; INTRAVENOUS at 08:29

## 2025-08-18 RX ADMIN — METOPROLOL SUCCINATE 25 MILLIGRAM(S): 50 TABLET, EXTENDED RELEASE ORAL at 21:08

## 2025-08-18 RX ADMIN — KETOROLAC TROMETHAMINE 15 MILLIGRAM(S): 30 INJECTION, SOLUTION INTRAMUSCULAR; INTRAVENOUS at 13:36

## 2025-08-18 RX ADMIN — Medication 0.5 MILLIGRAM(S): at 02:39

## 2025-08-18 RX ADMIN — ENOXAPARIN SODIUM 40 MILLIGRAM(S): 100 INJECTION SUBCUTANEOUS at 21:08

## 2025-08-19 LAB
ANION GAP SERPL CALC-SCNC: 9 MMOL/L — SIGNIFICANT CHANGE UP (ref 5–17)
BASOPHILS # BLD AUTO: 0.03 K/UL — SIGNIFICANT CHANGE UP (ref 0–0.2)
BASOPHILS NFR BLD AUTO: 0.5 % — SIGNIFICANT CHANGE UP (ref 0–2)
BUN SERPL-MCNC: 15.8 MG/DL — SIGNIFICANT CHANGE UP (ref 8–20)
CALCIUM SERPL-MCNC: 8 MG/DL — LOW (ref 8.4–10.5)
CHLORIDE SERPL-SCNC: 109 MMOL/L — HIGH (ref 96–108)
CO2 SERPL-SCNC: 23 MMOL/L — SIGNIFICANT CHANGE UP (ref 22–29)
CREAT SERPL-MCNC: 0.39 MG/DL — LOW (ref 0.5–1.3)
EGFR: 106 ML/MIN/1.73M2 — SIGNIFICANT CHANGE UP
EGFR: 106 ML/MIN/1.73M2 — SIGNIFICANT CHANGE UP
EOSINOPHIL # BLD AUTO: 0.41 K/UL — SIGNIFICANT CHANGE UP (ref 0–0.5)
EOSINOPHIL NFR BLD AUTO: 6.8 % — HIGH (ref 0–6)
GLUCOSE SERPL-MCNC: 97 MG/DL — SIGNIFICANT CHANGE UP (ref 70–99)
HCT VFR BLD CALC: 36.8 % — SIGNIFICANT CHANGE UP (ref 34.5–45)
HGB BLD-MCNC: 12 G/DL — SIGNIFICANT CHANGE UP (ref 11.5–15.5)
IMM GRANULOCYTES # BLD AUTO: 0.02 K/UL — SIGNIFICANT CHANGE UP (ref 0–0.07)
IMM GRANULOCYTES NFR BLD AUTO: 0.3 % — SIGNIFICANT CHANGE UP (ref 0–0.9)
LYMPHOCYTES # BLD AUTO: 1.28 K/UL — SIGNIFICANT CHANGE UP (ref 1–3.3)
LYMPHOCYTES NFR BLD AUTO: 21.1 % — SIGNIFICANT CHANGE UP (ref 13–44)
MCHC RBC-ENTMCNC: 31.3 PG — SIGNIFICANT CHANGE UP (ref 27–34)
MCHC RBC-ENTMCNC: 32.6 G/DL — SIGNIFICANT CHANGE UP (ref 32–36)
MCV RBC AUTO: 96.1 FL — SIGNIFICANT CHANGE UP (ref 80–100)
MONOCYTES # BLD AUTO: 0.75 K/UL — SIGNIFICANT CHANGE UP (ref 0–0.9)
MONOCYTES NFR BLD AUTO: 12.4 % — SIGNIFICANT CHANGE UP (ref 2–14)
NEUTROPHILS # BLD AUTO: 3.58 K/UL — SIGNIFICANT CHANGE UP (ref 1.8–7.4)
NEUTROPHILS NFR BLD AUTO: 58.9 % — SIGNIFICANT CHANGE UP (ref 43–77)
NRBC # BLD AUTO: 0 K/UL — SIGNIFICANT CHANGE UP (ref 0–0)
NRBC # FLD: 0 K/UL — SIGNIFICANT CHANGE UP (ref 0–0)
NRBC BLD AUTO-RTO: 0 /100 WBCS — SIGNIFICANT CHANGE UP (ref 0–0)
PLATELET # BLD AUTO: 200 K/UL — SIGNIFICANT CHANGE UP (ref 150–400)
PMV BLD: 9 FL — SIGNIFICANT CHANGE UP (ref 7–13)
POTASSIUM SERPL-MCNC: 4.8 MMOL/L — SIGNIFICANT CHANGE UP (ref 3.5–5.3)
POTASSIUM SERPL-SCNC: 4.8 MMOL/L — SIGNIFICANT CHANGE UP (ref 3.5–5.3)
RBC # BLD: 3.83 M/UL — SIGNIFICANT CHANGE UP (ref 3.8–5.2)
RBC # FLD: 15 % — HIGH (ref 10.3–14.5)
SODIUM SERPL-SCNC: 140 MMOL/L — SIGNIFICANT CHANGE UP (ref 135–145)
WBC # BLD: 6.07 K/UL — SIGNIFICANT CHANGE UP (ref 3.8–10.5)
WBC # FLD AUTO: 6.07 K/UL — SIGNIFICANT CHANGE UP (ref 3.8–10.5)

## 2025-08-19 PROCEDURE — 36415 COLL VENOUS BLD VENIPUNCTURE: CPT

## 2025-08-19 PROCEDURE — 94640 AIRWAY INHALATION TREATMENT: CPT

## 2025-08-19 PROCEDURE — 99232 SBSQ HOSP IP/OBS MODERATE 35: CPT

## 2025-08-19 PROCEDURE — 73700 CT LOWER EXTREMITY W/O DYE: CPT

## 2025-08-19 PROCEDURE — 86900 BLOOD TYPING SEROLOGIC ABO: CPT

## 2025-08-19 PROCEDURE — 85730 THROMBOPLASTIN TIME PARTIAL: CPT

## 2025-08-19 PROCEDURE — 73564 X-RAY EXAM KNEE 4 OR MORE: CPT

## 2025-08-19 PROCEDURE — 86850 RBC ANTIBODY SCREEN: CPT

## 2025-08-19 PROCEDURE — 80053 COMPREHEN METABOLIC PANEL: CPT

## 2025-08-19 PROCEDURE — 73502 X-RAY EXAM HIP UNI 2-3 VIEWS: CPT

## 2025-08-19 PROCEDURE — 73590 X-RAY EXAM OF LOWER LEG: CPT

## 2025-08-19 PROCEDURE — 85610 PROTHROMBIN TIME: CPT

## 2025-08-19 PROCEDURE — 80048 BASIC METABOLIC PNL TOTAL CA: CPT

## 2025-08-19 PROCEDURE — 85025 COMPLETE CBC W/AUTO DIFF WBC: CPT

## 2025-08-19 PROCEDURE — 97163 PT EVAL HIGH COMPLEX 45 MIN: CPT

## 2025-08-19 PROCEDURE — 73552 X-RAY EXAM OF FEMUR 2/>: CPT

## 2025-08-19 PROCEDURE — 86901 BLOOD TYPING SEROLOGIC RH(D): CPT

## 2025-08-19 RX ORDER — METOPROLOL SUCCINATE 50 MG/1
25 TABLET, EXTENDED RELEASE ORAL DAILY
Refills: 0 | Status: DISCONTINUED | OUTPATIENT
Start: 2025-08-19 | End: 2025-08-21

## 2025-08-19 RX ORDER — POLYETHYLENE GLYCOL 3350 17 G/17G
17 POWDER, FOR SOLUTION ORAL DAILY
Refills: 0 | Status: DISCONTINUED | OUTPATIENT
Start: 2025-08-19 | End: 2025-08-21

## 2025-08-19 RX ORDER — SENNA 187 MG
2 TABLET ORAL AT BEDTIME
Refills: 0 | Status: DISCONTINUED | OUTPATIENT
Start: 2025-08-19 | End: 2025-08-21

## 2025-08-19 RX ORDER — OXYCODONE HYDROCHLORIDE 30 MG/1
5 TABLET ORAL EVERY 4 HOURS
Refills: 0 | Status: DISCONTINUED | OUTPATIENT
Start: 2025-08-19 | End: 2025-08-21

## 2025-08-19 RX ORDER — LACTULOSE 10 G/15ML
10 SOLUTION ORAL EVERY 8 HOURS
Refills: 0 | Status: DISCONTINUED | OUTPATIENT
Start: 2025-08-19 | End: 2025-08-20

## 2025-08-19 RX ADMIN — KETOROLAC TROMETHAMINE 15 MILLIGRAM(S): 30 INJECTION, SOLUTION INTRAMUSCULAR; INTRAVENOUS at 04:54

## 2025-08-19 RX ADMIN — ESCITALOPRAM OXALATE 5 MILLIGRAM(S): 20 TABLET ORAL at 12:22

## 2025-08-19 RX ADMIN — ENOXAPARIN SODIUM 40 MILLIGRAM(S): 100 INJECTION SUBCUTANEOUS at 21:01

## 2025-08-19 RX ADMIN — CYCLOBENZAPRINE HYDROCHLORIDE 5 MILLIGRAM(S): 15 CAPSULE, EXTENDED RELEASE ORAL at 13:09

## 2025-08-19 RX ADMIN — Medication 0.5 MILLIGRAM(S): at 02:13

## 2025-08-19 RX ADMIN — Medication 0.5 MILLIGRAM(S): at 01:13

## 2025-08-19 RX ADMIN — OXYCODONE HYDROCHLORIDE 5 MILLIGRAM(S): 30 TABLET ORAL at 16:24

## 2025-08-19 RX ADMIN — Medication 650 MILLIGRAM(S): at 20:25

## 2025-08-19 RX ADMIN — OXYCODONE HYDROCHLORIDE 5 MILLIGRAM(S): 30 TABLET ORAL at 10:12

## 2025-08-19 RX ADMIN — KETOROLAC TROMETHAMINE 15 MILLIGRAM(S): 30 INJECTION, SOLUTION INTRAMUSCULAR; INTRAVENOUS at 21:25

## 2025-08-19 RX ADMIN — OXYCODONE HYDROCHLORIDE 5 MILLIGRAM(S): 30 TABLET ORAL at 17:24

## 2025-08-19 RX ADMIN — OXYCODONE HYDROCHLORIDE 5 MILLIGRAM(S): 30 TABLET ORAL at 09:12

## 2025-08-19 RX ADMIN — CYCLOBENZAPRINE HYDROCHLORIDE 5 MILLIGRAM(S): 15 CAPSULE, EXTENDED RELEASE ORAL at 21:01

## 2025-08-19 RX ADMIN — KETOROLAC TROMETHAMINE 15 MILLIGRAM(S): 30 INJECTION, SOLUTION INTRAMUSCULAR; INTRAVENOUS at 12:22

## 2025-08-19 RX ADMIN — KETOROLAC TROMETHAMINE 15 MILLIGRAM(S): 30 INJECTION, SOLUTION INTRAMUSCULAR; INTRAVENOUS at 13:22

## 2025-08-19 RX ADMIN — KETOROLAC TROMETHAMINE 15 MILLIGRAM(S): 30 INJECTION, SOLUTION INTRAMUSCULAR; INTRAVENOUS at 20:25

## 2025-08-19 RX ADMIN — Medication 650 MILLIGRAM(S): at 21:25

## 2025-08-19 RX ADMIN — Medication 2 TABLET(S): at 21:01

## 2025-08-19 RX ADMIN — KETOROLAC TROMETHAMINE 15 MILLIGRAM(S): 30 INJECTION, SOLUTION INTRAMUSCULAR; INTRAVENOUS at 05:54

## 2025-08-19 RX ADMIN — Medication 1 DOSE(S): at 12:27

## 2025-08-19 RX ADMIN — CYCLOBENZAPRINE HYDROCHLORIDE 5 MILLIGRAM(S): 15 CAPSULE, EXTENDED RELEASE ORAL at 04:53

## 2025-08-19 RX ADMIN — POLYETHYLENE GLYCOL 3350 17 GRAM(S): 17 POWDER, FOR SOLUTION ORAL at 12:21

## 2025-08-20 PROCEDURE — 36415 COLL VENOUS BLD VENIPUNCTURE: CPT

## 2025-08-20 PROCEDURE — 97163 PT EVAL HIGH COMPLEX 45 MIN: CPT

## 2025-08-20 PROCEDURE — 85610 PROTHROMBIN TIME: CPT

## 2025-08-20 PROCEDURE — 80053 COMPREHEN METABOLIC PANEL: CPT

## 2025-08-20 PROCEDURE — 86901 BLOOD TYPING SEROLOGIC RH(D): CPT

## 2025-08-20 PROCEDURE — 94640 AIRWAY INHALATION TREATMENT: CPT

## 2025-08-20 PROCEDURE — 73552 X-RAY EXAM OF FEMUR 2/>: CPT

## 2025-08-20 PROCEDURE — 99232 SBSQ HOSP IP/OBS MODERATE 35: CPT

## 2025-08-20 PROCEDURE — 73700 CT LOWER EXTREMITY W/O DYE: CPT

## 2025-08-20 PROCEDURE — 97110 THERAPEUTIC EXERCISES: CPT

## 2025-08-20 PROCEDURE — 73564 X-RAY EXAM KNEE 4 OR MORE: CPT

## 2025-08-20 PROCEDURE — 85025 COMPLETE CBC W/AUTO DIFF WBC: CPT

## 2025-08-20 PROCEDURE — 73590 X-RAY EXAM OF LOWER LEG: CPT

## 2025-08-20 PROCEDURE — 97116 GAIT TRAINING THERAPY: CPT

## 2025-08-20 PROCEDURE — 85730 THROMBOPLASTIN TIME PARTIAL: CPT

## 2025-08-20 PROCEDURE — 73502 X-RAY EXAM HIP UNI 2-3 VIEWS: CPT

## 2025-08-20 PROCEDURE — 86850 RBC ANTIBODY SCREEN: CPT

## 2025-08-20 PROCEDURE — 80048 BASIC METABOLIC PNL TOTAL CA: CPT

## 2025-08-20 PROCEDURE — 86900 BLOOD TYPING SEROLOGIC ABO: CPT

## 2025-08-20 RX ORDER — MAGNESIUM CITRATE
296 SOLUTION, ORAL ORAL ONCE
Refills: 0 | Status: COMPLETED | OUTPATIENT
Start: 2025-08-20 | End: 2025-08-20

## 2025-08-20 RX ORDER — LACTULOSE 10 G/15ML
10 SOLUTION ORAL EVERY 8 HOURS
Refills: 0 | Status: DISCONTINUED | OUTPATIENT
Start: 2025-08-20 | End: 2025-08-21

## 2025-08-20 RX ADMIN — OXYCODONE HYDROCHLORIDE 5 MILLIGRAM(S): 30 TABLET ORAL at 01:04

## 2025-08-20 RX ADMIN — OXYCODONE HYDROCHLORIDE 5 MILLIGRAM(S): 30 TABLET ORAL at 22:41

## 2025-08-20 RX ADMIN — POLYETHYLENE GLYCOL 3350 17 GRAM(S): 17 POWDER, FOR SOLUTION ORAL at 13:08

## 2025-08-20 RX ADMIN — OXYCODONE HYDROCHLORIDE 5 MILLIGRAM(S): 30 TABLET ORAL at 17:57

## 2025-08-20 RX ADMIN — OXYCODONE HYDROCHLORIDE 5 MILLIGRAM(S): 30 TABLET ORAL at 16:57

## 2025-08-20 RX ADMIN — CYCLOBENZAPRINE HYDROCHLORIDE 5 MILLIGRAM(S): 15 CAPSULE, EXTENDED RELEASE ORAL at 22:41

## 2025-08-20 RX ADMIN — KETOROLAC TROMETHAMINE 15 MILLIGRAM(S): 30 INJECTION, SOLUTION INTRAMUSCULAR; INTRAVENOUS at 09:45

## 2025-08-20 RX ADMIN — KETOROLAC TROMETHAMINE 15 MILLIGRAM(S): 30 INJECTION, SOLUTION INTRAMUSCULAR; INTRAVENOUS at 03:21

## 2025-08-20 RX ADMIN — Medication 650 MILLIGRAM(S): at 21:02

## 2025-08-20 RX ADMIN — KETOROLAC TROMETHAMINE 15 MILLIGRAM(S): 30 INJECTION, SOLUTION INTRAMUSCULAR; INTRAVENOUS at 04:21

## 2025-08-20 RX ADMIN — Medication 650 MILLIGRAM(S): at 04:21

## 2025-08-20 RX ADMIN — Medication 650 MILLIGRAM(S): at 22:00

## 2025-08-20 RX ADMIN — CYCLOBENZAPRINE HYDROCHLORIDE 5 MILLIGRAM(S): 15 CAPSULE, EXTENDED RELEASE ORAL at 05:31

## 2025-08-20 RX ADMIN — Medication 650 MILLIGRAM(S): at 03:21

## 2025-08-20 RX ADMIN — ENOXAPARIN SODIUM 40 MILLIGRAM(S): 100 INJECTION SUBCUTANEOUS at 22:41

## 2025-08-20 RX ADMIN — OXYCODONE HYDROCHLORIDE 5 MILLIGRAM(S): 30 TABLET ORAL at 05:31

## 2025-08-20 RX ADMIN — KETOROLAC TROMETHAMINE 15 MILLIGRAM(S): 30 INJECTION, SOLUTION INTRAMUSCULAR; INTRAVENOUS at 21:02

## 2025-08-20 RX ADMIN — KETOROLAC TROMETHAMINE 15 MILLIGRAM(S): 30 INJECTION, SOLUTION INTRAMUSCULAR; INTRAVENOUS at 14:05

## 2025-08-20 RX ADMIN — METOPROLOL SUCCINATE 25 MILLIGRAM(S): 50 TABLET, EXTENDED RELEASE ORAL at 05:31

## 2025-08-20 RX ADMIN — OXYCODONE HYDROCHLORIDE 5 MILLIGRAM(S): 30 TABLET ORAL at 23:00

## 2025-08-20 RX ADMIN — OXYCODONE HYDROCHLORIDE 5 MILLIGRAM(S): 30 TABLET ORAL at 11:47

## 2025-08-20 RX ADMIN — ESCITALOPRAM OXALATE 5 MILLIGRAM(S): 20 TABLET ORAL at 13:05

## 2025-08-20 RX ADMIN — KETOROLAC TROMETHAMINE 15 MILLIGRAM(S): 30 INJECTION, SOLUTION INTRAMUSCULAR; INTRAVENOUS at 08:45

## 2025-08-20 RX ADMIN — KETOROLAC TROMETHAMINE 15 MILLIGRAM(S): 30 INJECTION, SOLUTION INTRAMUSCULAR; INTRAVENOUS at 13:05

## 2025-08-20 RX ADMIN — CYCLOBENZAPRINE HYDROCHLORIDE 5 MILLIGRAM(S): 15 CAPSULE, EXTENDED RELEASE ORAL at 15:07

## 2025-08-20 RX ADMIN — LACTULOSE 10 GRAM(S): 10 SOLUTION ORAL at 16:58

## 2025-08-20 RX ADMIN — OXYCODONE HYDROCHLORIDE 5 MILLIGRAM(S): 30 TABLET ORAL at 10:47

## 2025-08-20 RX ADMIN — OXYCODONE HYDROCHLORIDE 5 MILLIGRAM(S): 30 TABLET ORAL at 00:04

## 2025-08-20 RX ADMIN — Medication 296 MILLILITER(S): at 19:07

## 2025-08-20 RX ADMIN — KETOROLAC TROMETHAMINE 15 MILLIGRAM(S): 30 INJECTION, SOLUTION INTRAMUSCULAR; INTRAVENOUS at 22:00

## 2025-08-21 ENCOUNTER — TRANSCRIPTION ENCOUNTER (OUTPATIENT)
Age: 75
End: 2025-08-21

## 2025-08-21 VITALS
RESPIRATION RATE: 18 BRPM | HEART RATE: 80 BPM | SYSTOLIC BLOOD PRESSURE: 128 MMHG | DIASTOLIC BLOOD PRESSURE: 75 MMHG | OXYGEN SATURATION: 94 % | TEMPERATURE: 98 F

## 2025-08-21 PROCEDURE — 97116 GAIT TRAINING THERAPY: CPT

## 2025-08-21 PROCEDURE — 86850 RBC ANTIBODY SCREEN: CPT

## 2025-08-21 PROCEDURE — 96375 TX/PRO/DX INJ NEW DRUG ADDON: CPT

## 2025-08-21 PROCEDURE — 86900 BLOOD TYPING SEROLOGIC ABO: CPT

## 2025-08-21 PROCEDURE — 73502 X-RAY EXAM HIP UNI 2-3 VIEWS: CPT

## 2025-08-21 PROCEDURE — 97163 PT EVAL HIGH COMPLEX 45 MIN: CPT

## 2025-08-21 PROCEDURE — 99239 HOSP IP/OBS DSCHRG MGMT >30: CPT

## 2025-08-21 PROCEDURE — 36415 COLL VENOUS BLD VENIPUNCTURE: CPT

## 2025-08-21 PROCEDURE — 73590 X-RAY EXAM OF LOWER LEG: CPT

## 2025-08-21 PROCEDURE — 86901 BLOOD TYPING SEROLOGIC RH(D): CPT

## 2025-08-21 PROCEDURE — 97110 THERAPEUTIC EXERCISES: CPT

## 2025-08-21 PROCEDURE — 96374 THER/PROPH/DIAG INJ IV PUSH: CPT

## 2025-08-21 PROCEDURE — 85730 THROMBOPLASTIN TIME PARTIAL: CPT

## 2025-08-21 PROCEDURE — 96376 TX/PRO/DX INJ SAME DRUG ADON: CPT

## 2025-08-21 PROCEDURE — 85610 PROTHROMBIN TIME: CPT

## 2025-08-21 PROCEDURE — 85025 COMPLETE CBC W/AUTO DIFF WBC: CPT

## 2025-08-21 PROCEDURE — 80048 BASIC METABOLIC PNL TOTAL CA: CPT

## 2025-08-21 PROCEDURE — 80053 COMPREHEN METABOLIC PANEL: CPT

## 2025-08-21 PROCEDURE — 73700 CT LOWER EXTREMITY W/O DYE: CPT

## 2025-08-21 PROCEDURE — 99285 EMERGENCY DEPT VISIT HI MDM: CPT | Mod: 25

## 2025-08-21 PROCEDURE — 73552 X-RAY EXAM OF FEMUR 2/>: CPT

## 2025-08-21 PROCEDURE — 73564 X-RAY EXAM KNEE 4 OR MORE: CPT

## 2025-08-21 PROCEDURE — 94640 AIRWAY INHALATION TREATMENT: CPT

## 2025-08-21 RX ORDER — ASPIRIN 325 MG
1 TABLET ORAL
Refills: 0 | DISCHARGE

## 2025-08-21 RX ORDER — LACTULOSE 10 G/15ML
15 SOLUTION ORAL
Qty: 0 | Refills: 0 | DISCHARGE
Start: 2025-08-21

## 2025-08-21 RX ORDER — ALENDRONATE SODIUM 70 MG/1
1 TABLET ORAL
Qty: 4 | Refills: 0
Start: 2025-08-21 | End: 2025-09-19

## 2025-08-21 RX ORDER — SENNA 187 MG
2 TABLET ORAL
Qty: 0 | Refills: 0 | DISCHARGE
Start: 2025-08-21

## 2025-08-21 RX ORDER — ENOXAPARIN SODIUM 100 MG/ML
40 INJECTION SUBCUTANEOUS
Qty: 30 | Refills: 0
Start: 2025-08-21 | End: 2025-09-04

## 2025-08-21 RX ORDER — CYCLOBENZAPRINE HYDROCHLORIDE 15 MG/1
1 CAPSULE, EXTENDED RELEASE ORAL
Qty: 0 | Refills: 0 | DISCHARGE
Start: 2025-08-21

## 2025-08-21 RX ORDER — POLYETHYLENE GLYCOL 3350 17 G/17G
17 POWDER, FOR SOLUTION ORAL
Qty: 0 | Refills: 0 | DISCHARGE
Start: 2025-08-21

## 2025-08-21 RX ADMIN — Medication 1 DOSE(S): at 11:20

## 2025-08-21 RX ADMIN — METOPROLOL SUCCINATE 25 MILLIGRAM(S): 50 TABLET, EXTENDED RELEASE ORAL at 06:24

## 2025-08-21 RX ADMIN — OXYCODONE HYDROCHLORIDE 5 MILLIGRAM(S): 30 TABLET ORAL at 05:39

## 2025-08-21 RX ADMIN — OXYCODONE HYDROCHLORIDE 5 MILLIGRAM(S): 30 TABLET ORAL at 04:02

## 2025-08-21 RX ADMIN — KETOROLAC TROMETHAMINE 15 MILLIGRAM(S): 30 INJECTION, SOLUTION INTRAMUSCULAR; INTRAVENOUS at 11:21

## 2025-08-21 RX ADMIN — KETOROLAC TROMETHAMINE 15 MILLIGRAM(S): 30 INJECTION, SOLUTION INTRAMUSCULAR; INTRAVENOUS at 01:23

## 2025-08-21 RX ADMIN — KETOROLAC TROMETHAMINE 15 MILLIGRAM(S): 30 INJECTION, SOLUTION INTRAMUSCULAR; INTRAVENOUS at 12:21

## 2025-08-21 RX ADMIN — CYCLOBENZAPRINE HYDROCHLORIDE 5 MILLIGRAM(S): 15 CAPSULE, EXTENDED RELEASE ORAL at 06:24

## 2025-08-21 RX ADMIN — Medication 650 MILLIGRAM(S): at 12:19

## 2025-08-21 RX ADMIN — OXYCODONE HYDROCHLORIDE 5 MILLIGRAM(S): 30 TABLET ORAL at 13:52

## 2025-08-21 RX ADMIN — KETOROLAC TROMETHAMINE 15 MILLIGRAM(S): 30 INJECTION, SOLUTION INTRAMUSCULAR; INTRAVENOUS at 07:15

## 2025-08-21 RX ADMIN — KETOROLAC TROMETHAMINE 15 MILLIGRAM(S): 30 INJECTION, SOLUTION INTRAMUSCULAR; INTRAVENOUS at 02:15

## 2025-08-21 RX ADMIN — Medication 650 MILLIGRAM(S): at 11:19

## 2025-08-21 RX ADMIN — KETOROLAC TROMETHAMINE 15 MILLIGRAM(S): 30 INJECTION, SOLUTION INTRAMUSCULAR; INTRAVENOUS at 06:29

## 2025-08-21 RX ADMIN — ESCITALOPRAM OXALATE 5 MILLIGRAM(S): 20 TABLET ORAL at 11:21

## 2025-08-21 RX ADMIN — OXYCODONE HYDROCHLORIDE 5 MILLIGRAM(S): 30 TABLET ORAL at 12:52
